# Patient Record
Sex: FEMALE | Race: WHITE | NOT HISPANIC OR LATINO
[De-identification: names, ages, dates, MRNs, and addresses within clinical notes are randomized per-mention and may not be internally consistent; named-entity substitution may affect disease eponyms.]

---

## 2017-01-04 ENCOUNTER — FORM ENCOUNTER (OUTPATIENT)
Age: 57
End: 2017-01-04

## 2017-01-13 ENCOUNTER — RECORD ABSTRACTING (OUTPATIENT)
Age: 57
End: 2017-01-13

## 2017-01-13 DIAGNOSIS — Z78.9 OTHER SPECIFIED HEALTH STATUS: ICD-10-CM

## 2017-01-13 DIAGNOSIS — F41.9 ANXIETY DISORDER, UNSPECIFIED: ICD-10-CM

## 2017-01-13 DIAGNOSIS — Z87.898 PERSONAL HISTORY OF OTHER SPECIFIED CONDITIONS: ICD-10-CM

## 2017-01-13 DIAGNOSIS — I10 ESSENTIAL (PRIMARY) HYPERTENSION: ICD-10-CM

## 2017-01-13 DIAGNOSIS — Z85.850 PERSONAL HISTORY OF MALIGNANT NEOPLASM OF THYROID: ICD-10-CM

## 2017-02-04 ENCOUNTER — TRANSCRIPTION ENCOUNTER (OUTPATIENT)
Age: 57
End: 2017-02-04

## 2017-02-13 ENCOUNTER — APPOINTMENT (OUTPATIENT)
Dept: OBGYN | Facility: CLINIC | Age: 57
End: 2017-02-13

## 2017-02-13 VITALS
HEIGHT: 64 IN | SYSTOLIC BLOOD PRESSURE: 120 MMHG | DIASTOLIC BLOOD PRESSURE: 80 MMHG | WEIGHT: 260 LBS | BODY MASS INDEX: 44.39 KG/M2

## 2017-02-16 ENCOUNTER — APPOINTMENT (OUTPATIENT)
Dept: OBGYN | Facility: CLINIC | Age: 57
End: 2017-02-16

## 2017-03-30 ENCOUNTER — APPOINTMENT (OUTPATIENT)
Dept: PLASTIC SURGERY | Facility: CLINIC | Age: 57
End: 2017-03-30

## 2017-04-24 ENCOUNTER — APPOINTMENT (OUTPATIENT)
Dept: PLASTIC SURGERY | Facility: AMBULATORY SURGERY CENTER | Age: 57
End: 2017-04-24

## 2017-04-26 ENCOUNTER — APPOINTMENT (OUTPATIENT)
Dept: PLASTIC SURGERY | Facility: CLINIC | Age: 57
End: 2017-04-26

## 2017-05-02 ENCOUNTER — APPOINTMENT (OUTPATIENT)
Dept: PLASTIC SURGERY | Facility: CLINIC | Age: 57
End: 2017-05-02

## 2017-05-09 ENCOUNTER — APPOINTMENT (OUTPATIENT)
Dept: PLASTIC SURGERY | Facility: CLINIC | Age: 57
End: 2017-05-09

## 2017-05-11 ENCOUNTER — OUTPATIENT (OUTPATIENT)
Dept: OUTPATIENT SERVICES | Facility: HOSPITAL | Age: 57
LOS: 1 days | Discharge: HOME | End: 2017-05-11

## 2017-05-12 ENCOUNTER — RECORD ABSTRACTING (OUTPATIENT)
Age: 57
End: 2017-05-12

## 2017-05-12 DIAGNOSIS — Z90.49 ACQUIRED ABSENCE OF OTHER SPECIFIED PARTS OF DIGESTIVE TRACT: ICD-10-CM

## 2017-05-12 RX ORDER — POTASSIUM CHLORIDE 10 MEQ
10 CAPSULE, EXTENDED RELEASE ORAL
Refills: 0 | Status: ACTIVE | COMMUNITY

## 2017-05-12 RX ORDER — NIFEDIPINE 30 MG/1
30 TABLET, EXTENDED RELEASE ORAL
Refills: 0 | Status: ACTIVE | COMMUNITY

## 2017-06-10 ENCOUNTER — TRANSCRIPTION ENCOUNTER (OUTPATIENT)
Age: 57
End: 2017-06-10

## 2017-06-19 ENCOUNTER — TRANSCRIPTION ENCOUNTER (OUTPATIENT)
Age: 57
End: 2017-06-19

## 2017-06-28 DIAGNOSIS — M79.89 OTHER SPECIFIED SOFT TISSUE DISORDERS: ICD-10-CM

## 2017-08-02 ENCOUNTER — OUTPATIENT (OUTPATIENT)
Dept: OUTPATIENT SERVICES | Facility: HOSPITAL | Age: 57
LOS: 1 days | Discharge: HOME | End: 2017-08-02

## 2017-08-02 DIAGNOSIS — E89.0 POSTPROCEDURAL HYPOTHYROIDISM: ICD-10-CM

## 2017-08-02 DIAGNOSIS — C73 MALIGNANT NEOPLASM OF THYROID GLAND: ICD-10-CM

## 2017-08-02 DIAGNOSIS — D50.9 IRON DEFICIENCY ANEMIA, UNSPECIFIED: ICD-10-CM

## 2017-08-02 DIAGNOSIS — E78.00 PURE HYPERCHOLESTEROLEMIA, UNSPECIFIED: ICD-10-CM

## 2017-08-02 DIAGNOSIS — E11.9 TYPE 2 DIABETES MELLITUS WITHOUT COMPLICATIONS: ICD-10-CM

## 2017-08-02 DIAGNOSIS — E53.8 DEFICIENCY OF OTHER SPECIFIED B GROUP VITAMINS: ICD-10-CM

## 2017-08-02 DIAGNOSIS — E05.90 THYROTOXICOSIS, UNSPECIFIED WITHOUT THYROTOXIC CRISIS OR STORM: ICD-10-CM

## 2017-08-08 ENCOUNTER — APPOINTMENT (OUTPATIENT)
Dept: PLASTIC SURGERY | Facility: CLINIC | Age: 57
End: 2017-08-08
Payer: COMMERCIAL

## 2017-08-08 PROCEDURE — 99024 POSTOP FOLLOW-UP VISIT: CPT

## 2017-08-08 RX ORDER — LEVOTHYROXINE SODIUM 125 UG/1
125 TABLET ORAL
Qty: 180 | Refills: 0 | Status: ACTIVE | COMMUNITY
Start: 2016-10-18

## 2017-08-14 ENCOUNTER — OUTPATIENT (OUTPATIENT)
Dept: OUTPATIENT SERVICES | Facility: HOSPITAL | Age: 57
LOS: 1 days | Discharge: HOME | End: 2017-08-14

## 2017-08-14 DIAGNOSIS — M79.609 PAIN IN UNSPECIFIED LIMB: ICD-10-CM

## 2017-09-23 ENCOUNTER — TRANSCRIPTION ENCOUNTER (OUTPATIENT)
Age: 57
End: 2017-09-23

## 2017-10-09 ENCOUNTER — OUTPATIENT (OUTPATIENT)
Dept: OUTPATIENT SERVICES | Facility: HOSPITAL | Age: 57
LOS: 1 days | Discharge: HOME | End: 2017-10-09

## 2017-10-09 DIAGNOSIS — C73 MALIGNANT NEOPLASM OF THYROID GLAND: ICD-10-CM

## 2017-10-09 DIAGNOSIS — E89.0 POSTPROCEDURAL HYPOTHYROIDISM: ICD-10-CM

## 2017-10-09 DIAGNOSIS — I10 ESSENTIAL (PRIMARY) HYPERTENSION: ICD-10-CM

## 2017-11-13 ENCOUNTER — FORM ENCOUNTER (OUTPATIENT)
Age: 57
End: 2017-11-13

## 2017-11-14 ENCOUNTER — OUTPATIENT (OUTPATIENT)
Dept: OUTPATIENT SERVICES | Facility: HOSPITAL | Age: 57
LOS: 1 days | Discharge: HOME | End: 2017-11-14

## 2017-11-14 DIAGNOSIS — Z12.31 ENCOUNTER FOR SCREENING MAMMOGRAM FOR MALIGNANT NEOPLASM OF BREAST: ICD-10-CM

## 2018-02-02 ENCOUNTER — TRANSCRIPTION ENCOUNTER (OUTPATIENT)
Age: 58
End: 2018-02-02

## 2018-02-13 ENCOUNTER — APPOINTMENT (OUTPATIENT)
Dept: PLASTIC SURGERY | Facility: CLINIC | Age: 58
End: 2018-02-13
Payer: COMMERCIAL

## 2018-02-13 DIAGNOSIS — C43.72 MALIGNANT MELANOMA OF LEFT LOWER LIMB, INCLUDING HIP: ICD-10-CM

## 2018-02-13 PROCEDURE — 99212 OFFICE O/P EST SF 10 MIN: CPT

## 2018-02-19 ENCOUNTER — TRANSCRIPTION ENCOUNTER (OUTPATIENT)
Age: 58
End: 2018-02-19

## 2018-02-23 ENCOUNTER — OUTPATIENT (OUTPATIENT)
Dept: OUTPATIENT SERVICES | Facility: HOSPITAL | Age: 58
LOS: 1 days | Discharge: HOME | End: 2018-02-23

## 2018-03-10 DIAGNOSIS — M79.605 PAIN IN LEFT LEG: ICD-10-CM

## 2018-04-10 ENCOUNTER — OUTPATIENT (OUTPATIENT)
Dept: OUTPATIENT SERVICES | Facility: HOSPITAL | Age: 58
LOS: 1 days | Discharge: HOME | End: 2018-04-10

## 2018-04-10 DIAGNOSIS — I10 ESSENTIAL (PRIMARY) HYPERTENSION: ICD-10-CM

## 2018-04-10 DIAGNOSIS — E89.0 POSTPROCEDURAL HYPOTHYROIDISM: ICD-10-CM

## 2018-04-10 LAB
ALBUMIN SERPL ELPH-MCNC: 4.4 G/DL — SIGNIFICANT CHANGE UP (ref 3.5–5.2)
ALP SERPL-CCNC: 64 U/L — SIGNIFICANT CHANGE UP (ref 30–115)
ALT FLD-CCNC: 15 U/L — SIGNIFICANT CHANGE UP (ref 0–41)
ANION GAP SERPL CALC-SCNC: 13 MMOL/L — SIGNIFICANT CHANGE UP (ref 7–14)
APPEARANCE UR: CLEAR — SIGNIFICANT CHANGE UP
AST SERPL-CCNC: 14 U/L — SIGNIFICANT CHANGE UP (ref 0–41)
BASOPHILS # BLD AUTO: 0.03 K/UL — SIGNIFICANT CHANGE UP (ref 0–0.2)
BASOPHILS NFR BLD AUTO: 0.5 % — SIGNIFICANT CHANGE UP (ref 0–1)
BILIRUB SERPL-MCNC: 0.5 MG/DL — SIGNIFICANT CHANGE UP (ref 0.2–1.2)
BILIRUB UR-MCNC: NEGATIVE — SIGNIFICANT CHANGE UP
BUN SERPL-MCNC: 27 MG/DL — HIGH (ref 10–20)
CALCIUM SERPL-MCNC: 8.8 MG/DL — SIGNIFICANT CHANGE UP (ref 8.5–10.1)
CHLORIDE SERPL-SCNC: 103 MMOL/L — SIGNIFICANT CHANGE UP (ref 98–110)
CHOLEST SERPL-MCNC: 158 MG/DL — SIGNIFICANT CHANGE UP (ref 100–200)
CO2 SERPL-SCNC: 29 MMOL/L — SIGNIFICANT CHANGE UP (ref 17–32)
COLOR SPEC: YELLOW — SIGNIFICANT CHANGE UP
CREAT SERPL-MCNC: 0.9 MG/DL — SIGNIFICANT CHANGE UP (ref 0.7–1.5)
DIFF PNL FLD: NEGATIVE — SIGNIFICANT CHANGE UP
EOSINOPHIL # BLD AUTO: 0.16 K/UL — SIGNIFICANT CHANGE UP (ref 0–0.7)
EOSINOPHIL NFR BLD AUTO: 2.8 % — SIGNIFICANT CHANGE UP (ref 0–8)
ESTIMATED AVERAGE GLUCOSE: 114 MG/DL — SIGNIFICANT CHANGE UP (ref 68–114)
GLUCOSE SERPL-MCNC: 78 MG/DL — SIGNIFICANT CHANGE UP (ref 70–99)
GLUCOSE UR QL: NEGATIVE MG/DL — SIGNIFICANT CHANGE UP
HBA1C BLD-MCNC: 5.6 % — SIGNIFICANT CHANGE UP (ref 4–5.6)
HCT VFR BLD CALC: 39.2 % — SIGNIFICANT CHANGE UP (ref 37–47)
HDLC SERPL-MCNC: 33 MG/DL — LOW (ref 40–125)
HGB BLD-MCNC: 12.4 G/DL — SIGNIFICANT CHANGE UP (ref 12–16)
IMM GRANULOCYTES NFR BLD AUTO: 0.4 % — HIGH (ref 0.1–0.3)
IRON SATN MFR SERPL: 71 UG/DL — SIGNIFICANT CHANGE UP (ref 35–150)
KETONES UR-MCNC: NEGATIVE — SIGNIFICANT CHANGE UP
LEUKOCYTE ESTERASE UR-ACNC: NEGATIVE — SIGNIFICANT CHANGE UP
LIPID PNL WITH DIRECT LDL SERPL: 111 MG/DL — SIGNIFICANT CHANGE UP (ref 4–129)
LYMPHOCYTES # BLD AUTO: 1.35 K/UL — SIGNIFICANT CHANGE UP (ref 1.2–3.4)
LYMPHOCYTES # BLD AUTO: 23.8 % — SIGNIFICANT CHANGE UP (ref 20.5–51.1)
MCHC RBC-ENTMCNC: 20.4 PG — LOW (ref 27–31)
MCHC RBC-ENTMCNC: 31.6 G/DL — LOW (ref 32–37)
MCV RBC AUTO: 64.4 FL — LOW (ref 81–99)
MONOCYTES # BLD AUTO: 0.4 K/UL — SIGNIFICANT CHANGE UP (ref 0.1–0.6)
MONOCYTES NFR BLD AUTO: 7 % — SIGNIFICANT CHANGE UP (ref 1.7–9.3)
NEUTROPHILS # BLD AUTO: 3.72 K/UL — SIGNIFICANT CHANGE UP (ref 1.4–6.5)
NEUTROPHILS NFR BLD AUTO: 65.5 % — SIGNIFICANT CHANGE UP (ref 42.2–75.2)
NITRITE UR-MCNC: NEGATIVE — SIGNIFICANT CHANGE UP
NRBC # BLD: 0 /100 WBCS — SIGNIFICANT CHANGE UP (ref 0–0)
PH UR: 6 — SIGNIFICANT CHANGE UP (ref 5–8)
PLATELET # BLD AUTO: 301 K/UL — SIGNIFICANT CHANGE UP (ref 130–400)
POTASSIUM SERPL-MCNC: 3.8 MMOL/L — SIGNIFICANT CHANGE UP (ref 3.5–5)
POTASSIUM SERPL-SCNC: 3.8 MMOL/L — SIGNIFICANT CHANGE UP (ref 3.5–5)
PROT SERPL-MCNC: 6.9 G/DL — SIGNIFICANT CHANGE UP (ref 6–8)
PROT UR-MCNC: NEGATIVE MG/DL — SIGNIFICANT CHANGE UP
RBC # BLD: 6.09 M/UL — HIGH (ref 4.2–5.4)
RBC # FLD: 17.5 % — HIGH (ref 11.5–14.5)
SODIUM SERPL-SCNC: 145 MMOL/L — SIGNIFICANT CHANGE UP (ref 135–146)
SP GR SPEC: 1.02 — SIGNIFICANT CHANGE UP (ref 1.01–1.03)
TOTAL CHOLESTEROL/HDL RATIO MEASUREMENT: 4.8 RATIO — SIGNIFICANT CHANGE UP (ref 4–5.5)
TRIGL SERPL-MCNC: 102 MG/DL — SIGNIFICANT CHANGE UP (ref 10–149)
UROBILINOGEN FLD QL: 0.2 MG/DL — SIGNIFICANT CHANGE UP (ref 0.2–0.2)
WBC # BLD: 5.68 K/UL — SIGNIFICANT CHANGE UP (ref 4.8–10.8)
WBC # FLD AUTO: 5.68 K/UL — SIGNIFICANT CHANGE UP (ref 4.8–10.8)

## 2018-04-11 LAB
24R-OH-CALCIDIOL SERPL-MCNC: 18 NG/ML — LOW (ref 30–80)
FERRITIN SERPL-MCNC: 263 NG/ML — HIGH (ref 15–150)
T4 FREE SERPL-MCNC: 1.6 NG/DL — SIGNIFICANT CHANGE UP (ref 0.9–1.8)
THYROGLOB AB FLD IA-ACNC: <20 IU/ML — SIGNIFICANT CHANGE UP (ref 0–40)
THYROGLOB AB SERPL-ACNC: <20 IU/ML — SIGNIFICANT CHANGE UP (ref 0–40)
THYROGLOB SERPL-MCNC: <0.2 NG/ML — LOW (ref 1.6–59.9)
TSH SERPL-MCNC: 1.66 UIU/ML — SIGNIFICANT CHANGE UP (ref 0.27–4.2)

## 2018-04-20 ENCOUNTER — APPOINTMENT (OUTPATIENT)
Dept: PLASTIC SURGERY | Facility: CLINIC | Age: 58
End: 2018-04-20
Payer: COMMERCIAL

## 2018-04-20 ENCOUNTER — LABORATORY RESULT (OUTPATIENT)
Age: 58
End: 2018-04-20

## 2018-04-20 ENCOUNTER — OUTPATIENT (OUTPATIENT)
Dept: OUTPATIENT SERVICES | Facility: HOSPITAL | Age: 58
LOS: 1 days | Discharge: HOME | End: 2018-04-20

## 2018-04-20 DIAGNOSIS — D48.5 NEOPLASM OF UNCERTAIN BEHAVIOR OF SKIN: ICD-10-CM

## 2018-04-20 PROCEDURE — 11402 EXC TR-EXT B9+MARG 1.1-2 CM: CPT

## 2018-04-20 PROCEDURE — 13121 CMPLX RPR S/A/L 2.6-7.5 CM: CPT | Mod: 59

## 2018-04-23 DIAGNOSIS — D48.5 NEOPLASM OF UNCERTAIN BEHAVIOR OF SKIN: ICD-10-CM

## 2018-05-03 ENCOUNTER — APPOINTMENT (OUTPATIENT)
Dept: PLASTIC SURGERY | Facility: CLINIC | Age: 58
End: 2018-05-03
Payer: COMMERCIAL

## 2018-05-03 DIAGNOSIS — D17.9 BENIGN LIPOMATOUS NEOPLASM, UNSPECIFIED: ICD-10-CM

## 2018-05-03 PROCEDURE — 99024 POSTOP FOLLOW-UP VISIT: CPT

## 2018-06-04 ENCOUNTER — APPOINTMENT (OUTPATIENT)
Dept: OBGYN | Facility: CLINIC | Age: 58
End: 2018-06-04

## 2018-06-04 ENCOUNTER — OUTPATIENT (OUTPATIENT)
Dept: OUTPATIENT SERVICES | Facility: HOSPITAL | Age: 58
LOS: 1 days | Discharge: HOME | End: 2018-06-04

## 2018-06-04 ENCOUNTER — LABORATORY RESULT (OUTPATIENT)
Age: 58
End: 2018-06-04

## 2018-06-04 VITALS
SYSTOLIC BLOOD PRESSURE: 122 MMHG | HEIGHT: 64 IN | WEIGHT: 264 LBS | BODY MASS INDEX: 45.07 KG/M2 | DIASTOLIC BLOOD PRESSURE: 80 MMHG

## 2018-06-05 DIAGNOSIS — Z01.419 ENCOUNTER FOR GYNECOLOGICAL EXAMINATION (GENERAL) (ROUTINE) WITHOUT ABNORMAL FINDINGS: ICD-10-CM

## 2018-06-13 DIAGNOSIS — Z01.419 ENCOUNTER FOR GYNECOLOGICAL EXAMINATION (GENERAL) (ROUTINE) WITHOUT ABNORMAL FINDINGS: ICD-10-CM

## 2018-06-20 LAB — HPV HIGH+LOW RISK DNA PNL CVX: DETECTED

## 2018-06-25 ENCOUNTER — OTHER (OUTPATIENT)
Age: 58
End: 2018-06-25

## 2018-07-10 ENCOUNTER — OUTPATIENT (OUTPATIENT)
Dept: OUTPATIENT SERVICES | Facility: HOSPITAL | Age: 58
LOS: 1 days | Discharge: HOME | End: 2018-07-10

## 2018-07-10 DIAGNOSIS — M25.572 PAIN IN LEFT ANKLE AND JOINTS OF LEFT FOOT: ICD-10-CM

## 2018-07-30 ENCOUNTER — OUTPATIENT (OUTPATIENT)
Dept: OUTPATIENT SERVICES | Facility: HOSPITAL | Age: 58
LOS: 1 days | Discharge: HOME | End: 2018-07-30

## 2018-07-30 ENCOUNTER — APPOINTMENT (OUTPATIENT)
Dept: OBGYN | Facility: CLINIC | Age: 58
End: 2018-07-30

## 2018-07-30 ENCOUNTER — RESULT CHARGE (OUTPATIENT)
Age: 58
End: 2018-07-30

## 2018-07-30 VITALS
DIASTOLIC BLOOD PRESSURE: 80 MMHG | SYSTOLIC BLOOD PRESSURE: 130 MMHG | BODY MASS INDEX: 45.07 KG/M2 | WEIGHT: 264 LBS | HEIGHT: 64 IN

## 2018-07-30 LAB
HCG UR QL: NEGATIVE
QUALITY CONTROL: YES

## 2018-07-31 DIAGNOSIS — R87.810 CERVICAL HIGH RISK HUMAN PAPILLOMAVIRUS (HPV) DNA TEST POSITIVE: ICD-10-CM

## 2018-09-17 ENCOUNTER — OUTPATIENT (OUTPATIENT)
Dept: OUTPATIENT SERVICES | Facility: HOSPITAL | Age: 58
LOS: 1 days | Discharge: HOME | End: 2018-09-17

## 2018-09-20 ENCOUNTER — OUTPATIENT (OUTPATIENT)
Dept: OUTPATIENT SERVICES | Facility: HOSPITAL | Age: 58
LOS: 1 days | Discharge: HOME | End: 2018-09-20

## 2018-09-24 ENCOUNTER — OUTPATIENT (OUTPATIENT)
Dept: OUTPATIENT SERVICES | Facility: HOSPITAL | Age: 58
LOS: 1 days | Discharge: HOME | End: 2018-09-24

## 2018-09-24 DIAGNOSIS — Z79.01 LONG TERM (CURRENT) USE OF ANTICOAGULANTS: ICD-10-CM

## 2018-09-26 DIAGNOSIS — Z79.01 LONG TERM (CURRENT) USE OF ANTICOAGULANTS: ICD-10-CM

## 2018-09-27 ENCOUNTER — OUTPATIENT (OUTPATIENT)
Dept: OUTPATIENT SERVICES | Facility: HOSPITAL | Age: 58
LOS: 1 days | Discharge: HOME | End: 2018-09-27

## 2018-09-27 DIAGNOSIS — Z79.01 LONG TERM (CURRENT) USE OF ANTICOAGULANTS: ICD-10-CM

## 2018-10-01 ENCOUNTER — OUTPATIENT (OUTPATIENT)
Dept: OUTPATIENT SERVICES | Facility: HOSPITAL | Age: 58
LOS: 1 days | Discharge: HOME | End: 2018-10-01

## 2018-10-01 DIAGNOSIS — Z79.01 LONG TERM (CURRENT) USE OF ANTICOAGULANTS: ICD-10-CM

## 2018-10-04 ENCOUNTER — OUTPATIENT (OUTPATIENT)
Dept: OUTPATIENT SERVICES | Facility: HOSPITAL | Age: 58
LOS: 1 days | Discharge: HOME | End: 2018-10-04

## 2018-10-04 DIAGNOSIS — Z79.01 LONG TERM (CURRENT) USE OF ANTICOAGULANTS: ICD-10-CM

## 2018-10-07 ENCOUNTER — EMERGENCY (EMERGENCY)
Facility: HOSPITAL | Age: 58
LOS: 0 days | Discharge: HOME | End: 2018-10-07
Attending: EMERGENCY MEDICINE | Admitting: PHYSICIAN ASSISTANT
Payer: OTHER MISCELLANEOUS

## 2018-10-07 DIAGNOSIS — M79.605 PAIN IN LEFT LEG: ICD-10-CM

## 2018-10-07 PROCEDURE — 93971 EXTREMITY STUDY: CPT | Mod: 26

## 2018-10-08 ENCOUNTER — OUTPATIENT (OUTPATIENT)
Dept: OUTPATIENT SERVICES | Facility: HOSPITAL | Age: 58
LOS: 1 days | Discharge: HOME | End: 2018-10-08

## 2018-10-08 DIAGNOSIS — Z79.01 LONG TERM (CURRENT) USE OF ANTICOAGULANTS: ICD-10-CM

## 2018-10-11 ENCOUNTER — OUTPATIENT (OUTPATIENT)
Dept: OUTPATIENT SERVICES | Facility: HOSPITAL | Age: 58
LOS: 1 days | Discharge: HOME | End: 2018-10-11

## 2018-10-11 DIAGNOSIS — Z79.01 LONG TERM (CURRENT) USE OF ANTICOAGULANTS: ICD-10-CM

## 2018-10-15 ENCOUNTER — OUTPATIENT (OUTPATIENT)
Dept: OUTPATIENT SERVICES | Facility: HOSPITAL | Age: 58
LOS: 1 days | Discharge: HOME | End: 2018-10-15

## 2018-10-16 ENCOUNTER — OUTPATIENT (OUTPATIENT)
Dept: OUTPATIENT SERVICES | Facility: HOSPITAL | Age: 58
LOS: 1 days | Discharge: HOME | End: 2018-10-16

## 2018-10-16 DIAGNOSIS — E55.9 VITAMIN D DEFICIENCY, UNSPECIFIED: ICD-10-CM

## 2018-10-16 DIAGNOSIS — I10 ESSENTIAL (PRIMARY) HYPERTENSION: ICD-10-CM

## 2018-10-16 DIAGNOSIS — E78.2 MIXED HYPERLIPIDEMIA: ICD-10-CM

## 2018-10-16 DIAGNOSIS — E06.3 AUTOIMMUNE THYROIDITIS: ICD-10-CM

## 2018-11-09 ENCOUNTER — APPOINTMENT (OUTPATIENT)
Dept: VASCULAR SURGERY | Facility: CLINIC | Age: 58
End: 2018-11-09
Payer: COMMERCIAL

## 2018-11-09 VITALS
DIASTOLIC BLOOD PRESSURE: 80 MMHG | SYSTOLIC BLOOD PRESSURE: 130 MMHG | BODY MASS INDEX: 42 KG/M2 | HEIGHT: 64 IN | WEIGHT: 246 LBS

## 2018-11-09 PROCEDURE — 99213 OFFICE O/P EST LOW 20 MIN: CPT

## 2018-11-09 PROCEDURE — 93971 EXTREMITY STUDY: CPT

## 2018-11-20 ENCOUNTER — OUTPATIENT (OUTPATIENT)
Dept: OUTPATIENT SERVICES | Facility: HOSPITAL | Age: 58
LOS: 1 days | Discharge: HOME | End: 2018-11-20

## 2018-11-20 DIAGNOSIS — E89.0 POSTPROCEDURAL HYPOTHYROIDISM: ICD-10-CM

## 2018-11-20 DIAGNOSIS — I10 ESSENTIAL (PRIMARY) HYPERTENSION: ICD-10-CM

## 2018-11-20 DIAGNOSIS — C73 MALIGNANT NEOPLASM OF THYROID GLAND: ICD-10-CM

## 2018-11-26 ENCOUNTER — OUTPATIENT (OUTPATIENT)
Dept: OUTPATIENT SERVICES | Facility: HOSPITAL | Age: 58
LOS: 1 days | Discharge: HOME | End: 2018-11-26

## 2018-11-26 DIAGNOSIS — Z12.31 ENCOUNTER FOR SCREENING MAMMOGRAM FOR MALIGNANT NEOPLASM OF BREAST: ICD-10-CM

## 2018-12-15 ENCOUNTER — TRANSCRIPTION ENCOUNTER (OUTPATIENT)
Age: 58
End: 2018-12-15

## 2019-02-19 ENCOUNTER — FORM ENCOUNTER (OUTPATIENT)
Age: 59
End: 2019-02-19

## 2019-02-20 ENCOUNTER — OUTPATIENT (OUTPATIENT)
Dept: OUTPATIENT SERVICES | Facility: HOSPITAL | Age: 59
LOS: 1 days | End: 2019-02-20
Payer: OTHER MISCELLANEOUS

## 2019-02-20 ENCOUNTER — APPOINTMENT (OUTPATIENT)
Dept: ORTHOPEDIC SURGERY | Facility: CLINIC | Age: 59
End: 2019-02-20
Payer: OTHER MISCELLANEOUS

## 2019-02-20 ENCOUNTER — APPOINTMENT (OUTPATIENT)
Dept: RADIOLOGY | Facility: CLINIC | Age: 59
End: 2019-02-20

## 2019-02-20 VITALS — RESPIRATION RATE: 16 BRPM | BODY MASS INDEX: 42 KG/M2 | WEIGHT: 246 LBS | HEIGHT: 64 IN

## 2019-02-20 DIAGNOSIS — M24.562 CONTRACTURE, LEFT KNEE: ICD-10-CM

## 2019-02-20 PROCEDURE — 99204 OFFICE O/P NEW MOD 45 MIN: CPT

## 2019-02-20 PROCEDURE — 73564 X-RAY EXAM KNEE 4 OR MORE: CPT

## 2019-02-20 PROCEDURE — 73564 X-RAY EXAM KNEE 4 OR MORE: CPT | Mod: 26,50

## 2019-02-20 RX ORDER — UBIDECARENONE/VIT E ACET 100MG-5
CAPSULE ORAL
Refills: 0 | Status: ACTIVE | COMMUNITY

## 2019-03-12 ENCOUNTER — OUTPATIENT (OUTPATIENT)
Dept: OUTPATIENT SERVICES | Facility: HOSPITAL | Age: 59
LOS: 1 days | Discharge: HOME | End: 2019-03-12

## 2019-03-12 DIAGNOSIS — E55.9 VITAMIN D DEFICIENCY, UNSPECIFIED: ICD-10-CM

## 2019-03-12 DIAGNOSIS — E53.8 DEFICIENCY OF OTHER SPECIFIED B GROUP VITAMINS: ICD-10-CM

## 2019-03-12 DIAGNOSIS — E78.5 HYPERLIPIDEMIA, UNSPECIFIED: ICD-10-CM

## 2019-03-12 DIAGNOSIS — D64.9 ANEMIA, UNSPECIFIED: ICD-10-CM

## 2019-03-12 DIAGNOSIS — D50.9 IRON DEFICIENCY ANEMIA, UNSPECIFIED: ICD-10-CM

## 2019-03-12 DIAGNOSIS — E11.9 TYPE 2 DIABETES MELLITUS WITHOUT COMPLICATIONS: ICD-10-CM

## 2019-03-12 DIAGNOSIS — M06.9 RHEUMATOID ARTHRITIS, UNSPECIFIED: ICD-10-CM

## 2019-03-12 DIAGNOSIS — N39.0 URINARY TRACT INFECTION, SITE NOT SPECIFIED: ICD-10-CM

## 2019-03-12 DIAGNOSIS — A69.20 LYME DISEASE, UNSPECIFIED: ICD-10-CM

## 2019-03-15 ENCOUNTER — APPOINTMENT (OUTPATIENT)
Dept: VASCULAR SURGERY | Facility: CLINIC | Age: 59
End: 2019-03-15
Payer: COMMERCIAL

## 2019-03-15 PROCEDURE — 93970 EXTREMITY STUDY: CPT

## 2019-03-15 PROCEDURE — 99213 OFFICE O/P EST LOW 20 MIN: CPT

## 2019-03-15 NOTE — HISTORY OF PRESENT ILLNESS
[FreeTextEntry1] : 58 y/o female with bilateral leg swelling and pain after left total knee replacement 6 months ago, sent in by PT for venous duplex to rule out DVT.

## 2019-03-15 NOTE — ASSESSMENT
[FreeTextEntry1] : 58 y/o female with bilateral leg swelling and pain after left total knee replacement 6 months ago, sent in by PT for venous duplex to rule out DVT. I performed a venous duplex which was medically necessary to evaluate for DVT. It showed no evidence of DVT bilaterally. I have informed her of the test result and reassured her. She may continue with physical therapy as tolerated.\par

## 2019-03-15 NOTE — DATA REVIEWED
[FreeTextEntry1] : I performed a venous duplex which was medically necessary to evaluate for DVT. It showed no evidence of DVT bilaterally.\par

## 2019-06-18 ENCOUNTER — OUTPATIENT (OUTPATIENT)
Dept: OUTPATIENT SERVICES | Facility: HOSPITAL | Age: 59
LOS: 1 days | Discharge: HOME | End: 2019-06-18

## 2019-06-18 DIAGNOSIS — E89.0 POSTPROCEDURAL HYPOTHYROIDISM: ICD-10-CM

## 2019-06-18 DIAGNOSIS — I10 ESSENTIAL (PRIMARY) HYPERTENSION: ICD-10-CM

## 2019-06-27 ENCOUNTER — FORM ENCOUNTER (OUTPATIENT)
Age: 59
End: 2019-06-27

## 2019-06-28 ENCOUNTER — OUTPATIENT (OUTPATIENT)
Dept: OUTPATIENT SERVICES | Facility: HOSPITAL | Age: 59
LOS: 1 days | End: 2019-06-28
Payer: OTHER MISCELLANEOUS

## 2019-06-28 ENCOUNTER — APPOINTMENT (OUTPATIENT)
Dept: RADIOLOGY | Facility: CLINIC | Age: 59
End: 2019-06-28

## 2019-06-28 ENCOUNTER — APPOINTMENT (OUTPATIENT)
Dept: ORTHOPEDIC SURGERY | Facility: CLINIC | Age: 59
End: 2019-06-28
Payer: OTHER MISCELLANEOUS

## 2019-06-28 VITALS — BODY MASS INDEX: 42 KG/M2 | HEIGHT: 64 IN | RESPIRATION RATE: 16 BRPM | WEIGHT: 246 LBS

## 2019-06-28 PROCEDURE — 99213 OFFICE O/P EST LOW 20 MIN: CPT

## 2019-06-28 PROCEDURE — 73564 X-RAY EXAM KNEE 4 OR MORE: CPT | Mod: 26,50

## 2019-07-10 LAB
BASOPHILS # BLD AUTO: 0.04 K/UL
BASOPHILS NFR BLD AUTO: 0.8 %
CRP SERPL-MCNC: 0.54 MG/DL
EOSINOPHIL # BLD AUTO: 0.15 K/UL
EOSINOPHIL NFR BLD AUTO: 2.9 %
ERYTHROCYTE [SEDIMENTATION RATE] IN BLOOD BY WESTERGREN METHOD: 36 MM/HR
HCT VFR BLD CALC: 41 %
HGB BLD-MCNC: 12 G/DL
IMM GRANULOCYTES NFR BLD AUTO: 0.4 %
LYMPHOCYTES # BLD AUTO: 1.18 K/UL
LYMPHOCYTES NFR BLD AUTO: 23 %
MAN DIFF?: NORMAL
MCHC RBC-ENTMCNC: 20.5 PG
MCHC RBC-ENTMCNC: 29.3 GM/DL
MCV RBC AUTO: 70.1 FL
MONOCYTES # BLD AUTO: 0.35 K/UL
MONOCYTES NFR BLD AUTO: 6.8 %
NEUTROPHILS # BLD AUTO: 3.39 K/UL
NEUTROPHILS NFR BLD AUTO: 66.1 %
PLATELET # BLD AUTO: 294 K/UL
RBC # BLD: 5.85 M/UL
RBC # FLD: 18.7 %
WBC # FLD AUTO: 5.13 K/UL

## 2019-07-11 ENCOUNTER — TRANSCRIPTION ENCOUNTER (OUTPATIENT)
Age: 59
End: 2019-07-11

## 2019-07-19 ENCOUNTER — APPOINTMENT (OUTPATIENT)
Dept: ORTHOPEDIC SURGERY | Facility: CLINIC | Age: 59
End: 2019-07-19
Payer: OTHER MISCELLANEOUS

## 2019-07-19 PROCEDURE — 20610 DRAIN/INJ JOINT/BURSA W/O US: CPT | Mod: RT

## 2019-07-19 NOTE — PROCEDURE
[de-identified] : Procedure: Euflexxa injection of the Right Knee joint \par Indication:  Inflammation and Joint pain. \par Risk, benefits and alternatives were discussed with the patient. Potential complications include bleeding, infection and allergic reaction. Verbal consent was obtained prior to the procedure. \par Alcohol was used to prep the area.  Ethyl chloride spray was used as a topical anesthetic. Using sterile technique, the aspiration/injection needle was then directed from a lateral and superior aspect. The procedure was ultrasound guided.  A 21 G 1.5 inch needle was used to inject 2 mL of Euflexxa lot# \par [id number]  Exp 2020-02-17\par A bandage was applied.  The patient tolerated the procedure well. \par Complications: None. \par Patient instructed to avoid strenuous activity for 2 day(s). \par Follow-up for repeat injection in 1-2 weeks, or following 3rd injection in 4-6 months; if pain is unresolved; or for further concerns.   Specifically discussed signs and symptoms of aseptic synovitis as well as septic arthritis, instructed patient to immediately present to the clinic (if open), or to an emergency room if these occur for further evaluation.\par

## 2019-07-19 NOTE — PHYSICAL EXAM
[de-identified] : Patient demonstrates antalgic gait with shortened stride length both the right and left side.\par \par Left Knee:\par \par Examination of the involved knee was performed inclusive of the following tests:\par \par Inspection:  effusion,quadriceps atrophy, skin\par \par Gait: stride length, john, heel strike\par \par Range of Motion: active and passive with comparison to contralateral knee\par \par Strength Testing: flexion and extention\par \par Tenderness Evaluation: medial and lateral joint line, medial and lateral patellar facet, quadriceps and patellar tendon, hamstring, pes anserinus\par \par Stability: varus and valgus at 0 and 30 degrees (1-3+), Lachman (1A unless noted),  anterior drawer (1-3+), posterior drawer (1-3+), pivot-shift (normal, glide, shift)\par \par Meniscus: Calos, Thessaly\par \par Patellofemoral: patellar grind, patellar compression, tracking, J-sign, tilt, test, apprehension, medial and lateral translation (2 quadrants unless otherwise noted)\par \par Abnormal findings were as follows:\par Patient ambulates with a cane and an antalgic gait. Ligamentously stable. She has full extension to 120 degrees flexion in the right knee and flexion contracture in the left knee with 10 degrees to 120 degrees range of motion of the left knee. Ligamentously stable.The incision is well-healed. There is mild effusion on exam today. There is no erythema. No warmth. There is diffuse tenderness to palpation.\par \par Right Knee:\par \par Examination of the involved knee was performed inclusive of the following tests:\par \par Inspection:  effusion,quadriceps atrophy, skin\par \par Gait: stride length, john, heel strike\par \par Range of Motion: active and passive with comparison to contralateral knee\par \par Strength Testing: flexion and extention\par \par Tenderness Evaluation: medial and lateral joint line, medial and lateral patellar facet, quadriceps and patellar tendon, hamstring, pes anserinus\par \par Stability: varus and valgus at 0 and 30 degrees (1-3+), Lachman (1A unless noted),  anterior drawer (1-3+), posterior drawer (1-3+), pivot-shift (normal, glide, shift)\par \par Meniscus: Reginaldo Live\par \par Patellofemoral: patellar grind, patellar compression, tracking, J-sign, tilt, test, apprehension, medial and lateral translation (2 quadrants unless otherwise noted)\par \par Abnormal findings were as follows:\par Right knee examination demonstrates range of motion from 5° to 120° today with a slight flexion contracture. Positive patellar compression and grind. Positive effusion. Positive tenderness to palpation of the medial and lateral joint line. Pain with Calos's both medially and laterally. Ligamentously stable [de-identified] : 6-28-19: \par New imaging: weightbearin X-Rays Were performed of the bilateft side demonstrate total knee arthroplastywhich is an ingrowth  Position and alignment within acceptable limits. Appropriately sized. There is some inset positioning of the tibia tray relativ given the ingrowth nature of this implant this is within acceptable limits. There is some lucency around the medial lateral periphery of the tibial baseplate but some evidence of early ingrowth on the baseplate until. No effusion. No other lucencies.\par \par Right knee x-ray images demonstrate moderate osteoarthritis with predominant patellofemoral involvement. There  is sclerosis and narrowing.\par

## 2019-07-19 NOTE — REASON FOR VISIT
[Follow-Up Visit] : a follow-up visit for [FreeTextEntry2] : right knee osteoarthritis, followup left painful TKA

## 2019-07-19 NOTE — HISTORY OF PRESENT ILLNESS
[de-identified] : 58 yo female presents for right knee osteoarthritis, probable Euflexxa injection;  followup painful left TKA.  No change in symptoms from prior visit. Pain in right knee moderate to severe, worse with squatting or knee flexion.  Pain in left knee is persistent.  Denies fever or chills, continued stiffness.  1 of 2 inflammatory markers at last visit were positie.

## 2019-07-19 NOTE — DISCUSSION/SUMMARY
[de-identified] : Patient was counseled once again regarding findings.\par \par With respect to the left knee she is one of 2 infectious markers positive. At this time recommend repeat infectious markers in 1-2 weeks. If she remains positive at that time would consider aspiration of the joint.\par \par With respect to the right knee, patient like to proceed with the Visco supplementation series. First dose of the flexor was applied today. Patient will return in 1-2 weeks for secondary injection.

## 2019-08-02 ENCOUNTER — APPOINTMENT (OUTPATIENT)
Dept: ORTHOPEDIC SURGERY | Facility: CLINIC | Age: 59
End: 2019-08-02
Payer: OTHER MISCELLANEOUS

## 2019-08-02 PROCEDURE — 99213 OFFICE O/P EST LOW 20 MIN: CPT | Mod: 25

## 2019-08-02 PROCEDURE — 20610 DRAIN/INJ JOINT/BURSA W/O US: CPT | Mod: RT

## 2019-08-02 NOTE — HISTORY OF PRESENT ILLNESS
[de-identified] : 59-year-old female presents in followup for her bilateral knee pain.\par \par Patient had her first right knee reflex injection several weeks ago. Reports some improvement in symptoms. Pain is still present but less severe. Catching/grinding sensation is less common. So far she is satisfied with the result and is eager for her second injection.\par \par With regards to the left knee, patient has persistent pain and stiffness. She is not obtained her repeat ESR and CRP this point is to early next week. Pain remained constant. She has intermittent episodes of swelling and stiffness.

## 2019-08-02 NOTE — REASON FOR VISIT
[Follow-Up Visit] : a follow-up visit for [FreeTextEntry2] : Followup for her right knee physical supplementation injection, left painful total knee arthroplasty

## 2019-08-02 NOTE — PROCEDURE
[de-identified] : Procedure: Euflexxa injection of the Right Knee joint \par Indication:  Inflammation and Joint pain. \par Risk, benefits and alternatives were discussed with the patient. Potential complications include bleeding, infection and allergic reaction. Verbal consent was obtained prior to the procedure. \par Alcohol was used to prep the area.  Ethyl chloride spray was used as a topical anesthetic. Using sterile technique, the aspiration/injection needle was then directed from a lateral and superior aspect. The procedure was ultrasound guided.  A 21 G 1.5 inch needle was used to inject 2 mL of Euflexxa lot# \par [id number]  Exp 2020-02-17\par A bandage was applied.  The patient tolerated the procedure well. \par Complications: None. \par Patient instructed to avoid strenuous activity for 2 day(s). \par Follow-up for repeat injection in 1-2 weeks, or following 3rd injection in 4-6 months; if pain is unresolved; or for further concerns.   Specifically discussed signs and symptoms of aseptic synovitis as well as septic arthritis, instructed patient to immediately present to the clinic (if open), or to an emergency room if these occur for further evaluation.\par

## 2019-08-02 NOTE — PHYSICAL EXAM
[de-identified] : 6-28-19: \par New imaging: weightbearing X-Rays were performed of the bilateft side demonstrate total knee arthroplasty which is an ingrowth  Position and alignment within acceptable limits. Appropriately sized. There is some inset positioning of the tibia tray relative given the ingrowth nature of this implant this is within acceptable limits. There is some lucency around the medial lateral periphery of the tibial baseplate but some evidence of early ingrowth on the baseplate until. No effusion. No other lucencies.\par \par Right knee x-ray images demonstrate moderate osteoarthritis with predominant patellofemoral involvement. There  is sclerosis and narrowing.\par  [de-identified] : Patient demonstrates antalgic gait with shortened stride length both the right and left side.\par \par Left Knee:\par \par Examination of the involved knee was performed inclusive of the following tests:\par \par Inspection:  effusion,quadriceps atrophy, skin\par \par Gait: stride length, john, heel strike\par \par Range of Motion: active and passive with comparison to contralateral knee\par \par Strength Testing: flexion and extention\par \par Tenderness Evaluation: medial and lateral joint line, medial and lateral patellar facet, quadriceps and patellar tendon, hamstring, pes anserinus\par \par Stability: varus and valgus at 0 and 30 degrees (1-3+), Lachman (1A unless noted),  anterior drawer (1-3+), posterior drawer (1-3+), pivot-shift (normal, glide, shift)\par \par Meniscus: Calos, Thessaly\par \par Patellofemoral: patellar grind, patellar compression, tracking, J-sign, tilt, test, apprehension, medial and lateral translation (2 quadrants unless otherwise noted)\par \par Abnormal findings were as follows:\par Patient ambulates with a cane and an antalgic gait. Ligamentously stable. She has full extension to 120 degrees flexion in the right knee and flexion contracture in the left knee with 10 degrees to 120 degrees range of motion of the left knee. Ligamentously stable.The incision is well-healed. There is mild effusion on exam today. There is no erythema. No warmth. There is diffuse tenderness to palpation.\par \par Right Knee:\par \par Examination of the involved knee was performed inclusive of the following tests:\par \par Inspection:  effusion,quadriceps atrophy, skin\par \par Gait: stride length, john, heel strike\par \par Range of Motion: active and passive with comparison to contralateral knee\par \par Strength Testing: flexion and extention\par \par Tenderness Evaluation: medial and lateral joint line, medial and lateral patellar facet, quadriceps and patellar tendon, hamstring, pes anserinus\par \par Stability: varus and valgus at 0 and 30 degrees (1-3+), Lachman (1A unless noted),  anterior drawer (1-3+), posterior drawer (1-3+), pivot-shift (normal, glide, shift)\par \par Meniscus: Reginaldo Live\par \par Patellofemoral: patellar grind, patellar compression, tracking, J-sign, tilt, test, apprehension, medial and lateral translation (2 quadrants unless otherwise noted)\par \par Abnormal findings were as follows:\par Right knee examination demonstrates range of motion from 5° to 120° today with a slight flexion contracture. Positive patellar compression and grind. Positive effusion. Positive tenderness to palpation of the medial and lateral joint line. Pain with Calos's both medially and laterally. Ligamentously stable

## 2019-08-05 ENCOUNTER — APPOINTMENT (OUTPATIENT)
Dept: OBGYN | Facility: CLINIC | Age: 59
End: 2019-08-05

## 2019-08-09 ENCOUNTER — LABORATORY RESULT (OUTPATIENT)
Age: 59
End: 2019-08-09

## 2019-08-09 ENCOUNTER — OUTPATIENT (OUTPATIENT)
Dept: OUTPATIENT SERVICES | Facility: HOSPITAL | Age: 59
LOS: 1 days | Discharge: HOME | End: 2019-08-09

## 2019-08-09 DIAGNOSIS — T84.84XA PAIN DUE TO INTERNAL ORTHOPEDIC PROSTHETIC DEVICES, IMPLANTS AND GRAFTS, INITIAL ENCOUNTER: ICD-10-CM

## 2019-08-12 ENCOUNTER — OUTPATIENT (OUTPATIENT)
Dept: OUTPATIENT SERVICES | Facility: HOSPITAL | Age: 59
LOS: 1 days | Discharge: HOME | End: 2019-08-12

## 2019-08-12 ENCOUNTER — APPOINTMENT (OUTPATIENT)
Dept: OBGYN | Facility: CLINIC | Age: 59
End: 2019-08-12
Payer: COMMERCIAL

## 2019-08-12 VITALS
SYSTOLIC BLOOD PRESSURE: 126 MMHG | WEIGHT: 255 LBS | BODY MASS INDEX: 43.54 KG/M2 | DIASTOLIC BLOOD PRESSURE: 90 MMHG | HEIGHT: 64 IN

## 2019-08-12 PROCEDURE — 99396 PREV VISIT EST AGE 40-64: CPT

## 2019-08-12 NOTE — HISTORY OF PRESENT ILLNESS
[1 Year Ago] : 1 year ago [Last Mammogram ___] : Last Mammogram was [unfilled] [Last Pap ___] : Last cervical pap smear was [unfilled]

## 2019-08-14 DIAGNOSIS — Z01.419 ENCOUNTER FOR GYNECOLOGICAL EXAMINATION (GENERAL) (ROUTINE) WITHOUT ABNORMAL FINDINGS: ICD-10-CM

## 2019-08-16 LAB — HPV HIGH+LOW RISK DNA PNL CVX: NOT DETECTED

## 2019-08-30 LAB — CRP SERPL-MCNC: 0.75 MG/DL

## 2019-09-06 ENCOUNTER — APPOINTMENT (OUTPATIENT)
Dept: ORTHOPEDIC SURGERY | Facility: CLINIC | Age: 59
End: 2019-09-06
Payer: OTHER MISCELLANEOUS

## 2019-09-06 DIAGNOSIS — E66.9 OBESITY, UNSPECIFIED: ICD-10-CM

## 2019-09-06 PROCEDURE — 99213 OFFICE O/P EST LOW 20 MIN: CPT | Mod: 25

## 2019-09-06 PROCEDURE — 20610 DRAIN/INJ JOINT/BURSA W/O US: CPT | Mod: RT

## 2019-09-06 NOTE — HISTORY OF PRESENT ILLNESS
[de-identified] : 59-year-old female presents in follow-up for her right knee osteoarthritis.  She is received 2 prior Euflexxa injections and would like to proceed with the third today.  She reports partial improvement in symptoms approximately 50% relief since initiating treatment on the right-hand side.  Denies any fever chills or swelling.  Overall satisfied with progress to date.  She would ideally like additional improvement following the third injection.\par \par With regards to the left knee, patient is already reviewed her own lab results and does identify that she has partial abnormalities with her ESR and CRP.  She would like to discuss additional treatment options moving forward.

## 2019-09-06 NOTE — PROCEDURE
[de-identified] : Procedure: Euflexxa injection of the Right Knee joint \par Indication:  Inflammation and Joint pain. \par Risk, benefits and alternatives were discussed with the patient. Potential complications include bleeding, infection and allergic reaction. Verbal consent was obtained prior to the procedure. \par Alcohol was used to prep the area.  Ethyl chloride spray was used as a topical anesthetic. Using sterile technique, the aspiration/injection needle was then directed from a lateral and superior aspect. The procedure was ultrasound guided.  A 21 G 1.5 inch needle was used to inject 2 mL of Euflexxa lot# \par [id number]  Exp 2020-02-17\par A bandage was applied.  The patient tolerated the procedure well. \par Complications: None. \par Patient instructed to avoid strenuous activity for 2 day(s). \par Follow-up for repeat injection in 1-2 weeks, or following 3rd injection in 4-6 months; if pain is unresolved; or for further concerns.   Specifically discussed signs and symptoms of aseptic synovitis as well as septic arthritis, instructed patient to immediately present to the clinic (if open), or to an emergency room if these occur for further evaluation.\par

## 2019-09-06 NOTE — PHYSICAL EXAM
[de-identified] : Patient demonstrates antalgic gait with shortened stride length both the right and left side.  Exam is unchanged from her visit last month.\par \par Left Knee:\par \par Examination of the involved knee was performed inclusive of the following tests:\par \par Inspection:  effusion,quadriceps atrophy, skin\par \par Gait: stride length, john, heel strike\par \par Range of Motion: active and passive with comparison to contralateral knee\par \par Strength Testing: flexion and extention\par \par Tenderness Evaluation: medial and lateral joint line, medial and lateral patellar facet, quadriceps and patellar tendon, hamstring, pes anserinus\par \par Stability: varus and valgus at 0 and 30 degrees (1-3+), Lachman (1A unless noted),  anterior drawer (1-3+), posterior drawer (1-3+), pivot-shift (normal, glide, shift)\par \par Meniscus: Calos, Thessaly\par \par Patellofemoral: patellar grind, patellar compression, tracking, J-sign, tilt, test, apprehension, medial and lateral translation (2 quadrants unless otherwise noted)\par \par Abnormal findings were as follows:\par Patient ambulates with a cane and an antalgic gait. Ligamentously stable. She has full extension to 120 degrees flexion in the right knee and flexion contracture in the left knee with 10 degrees to 120 degrees range of motion of the left knee. Ligamentously stable.The incision is well-healed. There is mild effusion on exam today. There is no erythema. No warmth. There is diffuse tenderness to palpation.\par \par Right Knee:\par \par Examination of the involved knee was performed inclusive of the following tests:\par \par Inspection:  effusion,quadriceps atrophy, skin\par \par Gait: stride length, john, heel strike\par \par Range of Motion: active and passive with comparison to contralateral knee\par \par Strength Testing: flexion and extention\par \par Tenderness Evaluation: medial and lateral joint line, medial and lateral patellar facet, quadriceps and patellar tendon, hamstring, pes anserinus\par \par Stability: varus and valgus at 0 and 30 degrees (1-3+), Lachman (1A unless noted),  anterior drawer (1-3+), posterior drawer (1-3+), pivot-shift (normal, glide, shift)\par \par Meniscus: Reginaldo Live\par \par Patellofemoral: patellar grind, patellar compression, tracking, J-sign, tilt, test, apprehension, medial and lateral translation (2 quadrants unless otherwise noted)\par \par Abnormal findings were as follows:\par Right knee examination demonstrates range of motion from 5° to 120° today with a slight flexion contracture. Positive patellar compression and grind. Positive effusion. Positive tenderness to palpation of the medial and lateral joint line. Pain with Calos's both medially and laterally. Ligamentously stable [de-identified] : 6-28-19: \par New imaging: weightbearing X-Rays were performed of the bilateft side demonstrate total knee arthroplasty which is an ingrowth  Position and alignment within acceptable limits. Appropriately sized. There is some inset positioning of the tibia tray relative given the ingrowth nature of this implant this is within acceptable limits. There is some lucency around the medial lateral periphery of the tibial baseplate but some evidence of early ingrowth on the baseplate until. No effusion. No other lucencies.\par \par Right knee x-ray images demonstrate moderate osteoarthritis with predominant patellofemoral involvement. There  is sclerosis and narrowing.\par

## 2019-09-06 NOTE — DISCUSSION/SUMMARY
[de-identified] : Patient was once again counseled regarding her right knee.  Repeat Euflexxa injection was performed.  This may be repeated as a series in 6 months.  Patient is currently satisfied with improvement to date.  If it wears off or she would like to discuss additional treatment options prior to that timeframe she may return for discussion..\par \par With regards to the left knee, constipation once again that with 1 of 2 inflammatory markers positive and and with her history of IBS it is unclear whether this is a true positive result or a secondary finding.  Options this point are to repeat ESR and CRP or potentially to perform an aspiration culture of the left knee.  I did  her today that frequently if an aspiration and culture is desired by the surgeon performing a revision total joint arthroplasty they frequently would like this to be performed at their home hospital.  As I would not be the surgeon performing the revision knee arthroplasty provide her several names for additional providers who may be able to evaluate her and consider this aspiration at her request.

## 2019-09-06 NOTE — REASON FOR VISIT
[Follow-Up Visit] : a follow-up visit for [FreeTextEntry2] : Follow-up right knee osteoarthritis, painful left total knee arthroplasty

## 2019-09-24 ENCOUNTER — APPOINTMENT (OUTPATIENT)
Dept: ORTHOPEDIC SURGERY | Facility: CLINIC | Age: 59
End: 2019-09-24
Payer: OTHER MISCELLANEOUS

## 2019-09-24 VITALS
OXYGEN SATURATION: 99 % | HEART RATE: 76 BPM | HEIGHT: 64 IN | WEIGHT: 248 LBS | SYSTOLIC BLOOD PRESSURE: 120 MMHG | BODY MASS INDEX: 42.34 KG/M2 | DIASTOLIC BLOOD PRESSURE: 80 MMHG

## 2019-09-24 PROCEDURE — 99214 OFFICE O/P EST MOD 30 MIN: CPT | Mod: 25

## 2019-09-24 PROCEDURE — 20553 NJX 1/MLT TRIGGER POINTS 3/>: CPT | Mod: LT

## 2019-09-26 NOTE — PHYSICAL EXAM
[de-identified] : Left Knee: skin is intact, no erythema, surgical scar is well healed on anterior knee.  no joint effusion. + medial joint line tenderness, PCL is intact with posterior drawer,  negative lachman, no collateral ligamentous laxity with stress. ROM is 0-100 degrees with no pain. DF/PF/EHL intact with 5/5 strength. 2+ DP pulse. SILT L3-S1.\par  [de-identified] : xray left knee - well aligned TKA components with patella resurfaced, cementless implants with no sign of fracture. lucency around femoral and tibial components but no obvious acute loosening.

## 2019-09-26 NOTE — HISTORY OF PRESENT ILLNESS
[Stable] : stable [5] : an average pain level of 5/10 [de-identified] : new patient for left knee pain since her left TKA 1 year ago at [facility]. she has never been pain free.  her surgery was done originally for work comp after a fall. she complains of pain and swelling in the area. she has done 10 months of physical therapy which has not helped her pain. she has not had any injections in her left knee since surgery. Dr. Galdamez has been seeing her recently. He did CRP and ESR which showed a mildly elevated CRP at .7 and most recently a normal ESR. she has never had drainage or indications of infection. she denies any instability or mechanical symptoms.

## 2019-09-26 NOTE — ASSESSMENT
[FreeTextEntry1] : 60yo female with left knee pain S/P Left TKA 1 year prior at [facility]\par \par Trigger point injections with local and steroid in 2 places on medial left knee\par Follow up in 2 weeks to assess trigger points and knee pain.\par \par \par All medical record entries made by the PA/Tessaibravi/Fellow are at my, Dr. Velasquez Chatman's direction and personally dictated by me on 09/24/2019]. I have reviewed the chart and agree that the record accurately reflects my personal performance of the history, physical exam, assessment, and plan. I have also personally directed reviewed, and agreed with the chart.\par

## 2019-10-07 ENCOUNTER — APPOINTMENT (OUTPATIENT)
Dept: ORTHOPEDIC SURGERY | Facility: CLINIC | Age: 59
End: 2019-10-07
Payer: OTHER MISCELLANEOUS

## 2019-10-07 PROCEDURE — 20610 DRAIN/INJ JOINT/BURSA W/O US: CPT | Mod: LT

## 2019-10-07 PROCEDURE — 99213 OFFICE O/P EST LOW 20 MIN: CPT | Mod: 24

## 2019-10-22 ENCOUNTER — OTHER (OUTPATIENT)
Age: 59
End: 2019-10-22

## 2019-10-22 DIAGNOSIS — N95.0 POSTMENOPAUSAL BLEEDING: ICD-10-CM

## 2019-10-22 NOTE — ASSESSMENT
[FreeTextEntry1] : Assessment/Plan\par \par 59 year old female with left knee pain S/P left TKA 1 year prior at [facility]par \par Trigger point injections with local and steroid in 2 places on medial left knee.\par Follow up in 2 weeks to assess trigger points and knee pain.\par \par All medical record entries made by the PA/Tessaibravi/Fellow are at my, Dr. Velasquez Chatman's direction and personally dictated by me on 10/07/2019]. I have reviewed the chart and agree that the record accurately reflects my personal performance of the history, physical exam, assessment, and plan. I have also personally directed reviewed, and agreed with the chart.\par

## 2019-10-22 NOTE — PROCEDURE
[de-identified] : After obtaining verbal consent and under normal sterile conditions the left knee joint was injected with 4 cc of lidocaine and 1 cc of 40 mg per mL of Kenalog.

## 2019-10-22 NOTE — HISTORY OF PRESENT ILLNESS
[de-identified] : Patient is a 59 year old female following up from injections 2 weeks ago. She previously had 8/10 pain, now she has 3-4/10 pain globally, but two tender trigger points medially.

## 2019-10-28 ENCOUNTER — APPOINTMENT (OUTPATIENT)
Dept: ANTEPARTUM | Facility: CLINIC | Age: 59
End: 2019-10-28
Payer: COMMERCIAL

## 2019-10-28 ENCOUNTER — ASOB RESULT (OUTPATIENT)
Age: 59
End: 2019-10-28

## 2019-10-28 ENCOUNTER — OUTPATIENT (OUTPATIENT)
Dept: OUTPATIENT SERVICES | Facility: HOSPITAL | Age: 59
LOS: 1 days | Discharge: HOME | End: 2019-10-28

## 2019-10-28 PROCEDURE — 76830 TRANSVAGINAL US NON-OB: CPT | Mod: 26

## 2019-10-28 PROCEDURE — 76856 US EXAM PELVIC COMPLETE: CPT | Mod: 26

## 2019-10-30 ENCOUNTER — APPOINTMENT (OUTPATIENT)
Dept: ORTHOPEDIC SURGERY | Facility: CLINIC | Age: 59
End: 2019-10-30
Payer: OTHER MISCELLANEOUS

## 2019-10-30 PROCEDURE — 99213 OFFICE O/P EST LOW 20 MIN: CPT

## 2019-11-11 ENCOUNTER — FORM ENCOUNTER (OUTPATIENT)
Age: 59
End: 2019-11-11

## 2019-11-12 ENCOUNTER — OUTPATIENT (OUTPATIENT)
Dept: OUTPATIENT SERVICES | Facility: HOSPITAL | Age: 59
LOS: 1 days | Discharge: HOME | End: 2019-11-12
Payer: COMMERCIAL

## 2019-11-12 VITALS
TEMPERATURE: 98 F | OXYGEN SATURATION: 100 % | SYSTOLIC BLOOD PRESSURE: 148 MMHG | RESPIRATION RATE: 20 BRPM | WEIGHT: 253.53 LBS | HEIGHT: 64 IN | HEART RATE: 72 BPM | DIASTOLIC BLOOD PRESSURE: 74 MMHG

## 2019-11-12 DIAGNOSIS — Z98.890 OTHER SPECIFIED POSTPROCEDURAL STATES: Chronic | ICD-10-CM

## 2019-11-12 DIAGNOSIS — Z01.818 ENCOUNTER FOR OTHER PREPROCEDURAL EXAMINATION: ICD-10-CM

## 2019-11-12 DIAGNOSIS — N95.0 POSTMENOPAUSAL BLEEDING: ICD-10-CM

## 2019-11-12 DIAGNOSIS — Z96.652 PRESENCE OF LEFT ARTIFICIAL KNEE JOINT: Chronic | ICD-10-CM

## 2019-11-12 DIAGNOSIS — Z90.49 ACQUIRED ABSENCE OF OTHER SPECIFIED PARTS OF DIGESTIVE TRACT: Chronic | ICD-10-CM

## 2019-11-12 LAB
ALBUMIN SERPL ELPH-MCNC: 4.1 G/DL — SIGNIFICANT CHANGE UP (ref 3.5–5.2)
ALP SERPL-CCNC: 74 U/L — SIGNIFICANT CHANGE UP (ref 30–115)
ALT FLD-CCNC: 20 U/L — SIGNIFICANT CHANGE UP (ref 0–41)
ANION GAP SERPL CALC-SCNC: 18 MMOL/L — HIGH (ref 7–14)
APPEARANCE UR: CLEAR — SIGNIFICANT CHANGE UP
APTT BLD: 36.6 SEC — SIGNIFICANT CHANGE UP (ref 27–39.2)
AST SERPL-CCNC: 14 U/L — SIGNIFICANT CHANGE UP (ref 0–41)
BACTERIA # UR AUTO: ABNORMAL
BASOPHILS # BLD AUTO: 0.03 K/UL — SIGNIFICANT CHANGE UP (ref 0–0.2)
BASOPHILS NFR BLD AUTO: 0.4 % — SIGNIFICANT CHANGE UP (ref 0–1)
BILIRUB SERPL-MCNC: 0.6 MG/DL — SIGNIFICANT CHANGE UP (ref 0.2–1.2)
BILIRUB UR-MCNC: NEGATIVE — SIGNIFICANT CHANGE UP
BLD GP AB SCN SERPL QL: SIGNIFICANT CHANGE UP
BUN SERPL-MCNC: 20 MG/DL — SIGNIFICANT CHANGE UP (ref 10–20)
CALCIUM SERPL-MCNC: 8.9 MG/DL — SIGNIFICANT CHANGE UP (ref 8.5–10.1)
CHLORIDE SERPL-SCNC: 101 MMOL/L — SIGNIFICANT CHANGE UP (ref 98–110)
CO2 SERPL-SCNC: 25 MMOL/L — SIGNIFICANT CHANGE UP (ref 17–32)
COLOR SPEC: YELLOW — SIGNIFICANT CHANGE UP
CREAT SERPL-MCNC: 0.7 MG/DL — SIGNIFICANT CHANGE UP (ref 0.7–1.5)
DIFF PNL FLD: NEGATIVE — SIGNIFICANT CHANGE UP
EOSINOPHIL # BLD AUTO: 0.05 K/UL — SIGNIFICANT CHANGE UP (ref 0–0.7)
EOSINOPHIL NFR BLD AUTO: 0.6 % — SIGNIFICANT CHANGE UP (ref 0–8)
EPI CELLS # UR: 5 /HPF — SIGNIFICANT CHANGE UP (ref 0–5)
GLUCOSE SERPL-MCNC: 82 MG/DL — SIGNIFICANT CHANGE UP (ref 70–99)
GLUCOSE UR QL: NEGATIVE — SIGNIFICANT CHANGE UP
HCT VFR BLD CALC: 39.6 % — SIGNIFICANT CHANGE UP (ref 37–47)
HGB BLD-MCNC: 12.1 G/DL — SIGNIFICANT CHANGE UP (ref 12–16)
HYALINE CASTS # UR AUTO: 1 /LPF — SIGNIFICANT CHANGE UP (ref 0–7)
IMM GRANULOCYTES NFR BLD AUTO: 0.8 % — HIGH (ref 0.1–0.3)
INR BLD: 1.02 RATIO — SIGNIFICANT CHANGE UP (ref 0.65–1.3)
KETONES UR-MCNC: NEGATIVE — SIGNIFICANT CHANGE UP
LEUKOCYTE ESTERASE UR-ACNC: ABNORMAL
LYMPHOCYTES # BLD AUTO: 1.06 K/UL — LOW (ref 1.2–3.4)
LYMPHOCYTES # BLD AUTO: 12.6 % — LOW (ref 20.5–51.1)
MCHC RBC-ENTMCNC: 20.4 PG — LOW (ref 27–31)
MCHC RBC-ENTMCNC: 30.6 G/DL — LOW (ref 32–37)
MCV RBC AUTO: 66.7 FL — LOW (ref 81–99)
MONOCYTES # BLD AUTO: 0.55 K/UL — SIGNIFICANT CHANGE UP (ref 0.1–0.6)
MONOCYTES NFR BLD AUTO: 6.5 % — SIGNIFICANT CHANGE UP (ref 1.7–9.3)
NEUTROPHILS # BLD AUTO: 6.67 K/UL — HIGH (ref 1.4–6.5)
NEUTROPHILS NFR BLD AUTO: 79.1 % — HIGH (ref 42.2–75.2)
NITRITE UR-MCNC: NEGATIVE — SIGNIFICANT CHANGE UP
NRBC # BLD: 0 /100 WBCS — SIGNIFICANT CHANGE UP (ref 0–0)
PH UR: 6 — SIGNIFICANT CHANGE UP (ref 5–8)
PLATELET # BLD AUTO: 314 K/UL — SIGNIFICANT CHANGE UP (ref 130–400)
POTASSIUM SERPL-MCNC: 4 MMOL/L — SIGNIFICANT CHANGE UP (ref 3.5–5)
POTASSIUM SERPL-SCNC: 4 MMOL/L — SIGNIFICANT CHANGE UP (ref 3.5–5)
PROT SERPL-MCNC: 6.8 G/DL — SIGNIFICANT CHANGE UP (ref 6–8)
PROT UR-MCNC: SIGNIFICANT CHANGE UP
PROTHROM AB SERPL-ACNC: 11.7 SEC — SIGNIFICANT CHANGE UP (ref 9.95–12.87)
RBC # BLD: 5.94 M/UL — HIGH (ref 4.2–5.4)
RBC # FLD: 18.9 % — HIGH (ref 11.5–14.5)
RBC CASTS # UR COMP ASSIST: 2 /HPF — SIGNIFICANT CHANGE UP (ref 0–4)
SODIUM SERPL-SCNC: 144 MMOL/L — SIGNIFICANT CHANGE UP (ref 135–146)
SP GR SPEC: 1.03 — HIGH (ref 1.01–1.02)
UROBILINOGEN FLD QL: SIGNIFICANT CHANGE UP
WBC # BLD: 8.43 K/UL — SIGNIFICANT CHANGE UP (ref 4.8–10.8)
WBC # FLD AUTO: 8.43 K/UL — SIGNIFICANT CHANGE UP (ref 4.8–10.8)
WBC UR QL: 7 /HPF — HIGH (ref 0–5)

## 2019-11-12 PROCEDURE — 71046 X-RAY EXAM CHEST 2 VIEWS: CPT | Mod: 26

## 2019-11-12 PROCEDURE — 93010 ELECTROCARDIOGRAM REPORT: CPT

## 2019-11-12 NOTE — H&P PST ADULT - NSICDXPASTMEDICALHX_GEN_ALL_CORE_FT
PAST MEDICAL HISTORY:  Fibromyalgia     GERD (gastroesophageal reflux disease)     H/O melanoma excision     HTN (hypertension)     Hypothyroidism     Irritable bowel disease     Lung nodule     Migraines

## 2019-11-12 NOTE — H&P PST ADULT - HISTORY OF PRESENT ILLNESS
60 y/o female scheduled for robotic assisted total lap hysterectomy  reports no c/o cp,sob,palpitations,cough or dysuria  1-2 fos without sob

## 2019-11-12 NOTE — H&P PST ADULT - NSICDXPASTSURGICALHX_GEN_ALL_CORE_FT
PAST SURGICAL HISTORY:  History of D&C     History of left knee replacement     S/P laparoscopic cholecystectomy     S/P thyroidectomy

## 2019-11-15 NOTE — HISTORY OF PRESENT ILLNESS
[Stable] : stable [4] : an average pain level of 4/10 [de-identified] : left knee pain TKA put in by [facility] one year prior. she had 2 trigger point injections 3 weeks ago and it helped her pain. no acute events.

## 2019-11-15 NOTE — ASSESSMENT
[FreeTextEntry1] : 58yo female with left knee pain\par \par trigger point injections x2 today\par home exercises\par pain control PRN\par follow up in 6 weeks PRN\par \par \par All medical record entries made by the PA/Scribe/Fellow are at my, Dr. Velasquez Chatman's direction and personally dictated by me on 10/30/2019]. I have reviewed the chart and agree that the record accurately reflects my personal performance of the history, physical exam, assessment, and plan. I have also personally directed reviewed, and agreed with the chart.\par

## 2019-11-15 NOTE — PHYSICAL EXAM
[de-identified] : Musculoskeletal:. Left Knee: skin is intact, no erythema, surgical scar is well healed on anterior knee. no joint effusion. + medial joint line tenderness, PCL is intact with posterior drawer, negative Lachman, no collateral ligamentous laxity with stress. ROM is 0-100 degrees with no pain. DF/PF/EHL intact with 5/5 strength. 2+ DP pulse. SILT L3-S1.

## 2019-11-19 ENCOUNTER — INPATIENT (INPATIENT)
Facility: HOSPITAL | Age: 59
LOS: 0 days | Discharge: HOME | End: 2019-11-20
Attending: OBSTETRICS & GYNECOLOGY | Admitting: OBSTETRICS & GYNECOLOGY
Payer: COMMERCIAL

## 2019-11-19 ENCOUNTER — RESULT REVIEW (OUTPATIENT)
Age: 59
End: 2019-11-19

## 2019-11-19 VITALS
RESPIRATION RATE: 18 BRPM | HEART RATE: 85 BPM | TEMPERATURE: 98 F | SYSTOLIC BLOOD PRESSURE: 134 MMHG | HEIGHT: 64 IN | WEIGHT: 248.02 LBS | DIASTOLIC BLOOD PRESSURE: 71 MMHG

## 2019-11-19 DIAGNOSIS — Z90.49 ACQUIRED ABSENCE OF OTHER SPECIFIED PARTS OF DIGESTIVE TRACT: Chronic | ICD-10-CM

## 2019-11-19 DIAGNOSIS — Z98.890 OTHER SPECIFIED POSTPROCEDURAL STATES: Chronic | ICD-10-CM

## 2019-11-19 DIAGNOSIS — Z96.652 PRESENCE OF LEFT ARTIFICIAL KNEE JOINT: Chronic | ICD-10-CM

## 2019-11-19 LAB
ABO RH CONFIRMATION: SIGNIFICANT CHANGE UP
ANION GAP SERPL CALC-SCNC: 13 MMOL/L — SIGNIFICANT CHANGE UP (ref 7–14)
BASOPHILS # BLD AUTO: 0.02 K/UL — SIGNIFICANT CHANGE UP (ref 0–0.2)
BASOPHILS NFR BLD AUTO: 0.1 % — SIGNIFICANT CHANGE UP (ref 0–1)
BUN SERPL-MCNC: 15 MG/DL — SIGNIFICANT CHANGE UP (ref 10–20)
CALCIUM SERPL-MCNC: 8.1 MG/DL — LOW (ref 8.5–10.1)
CHLORIDE SERPL-SCNC: 104 MMOL/L — SIGNIFICANT CHANGE UP (ref 98–110)
CO2 SERPL-SCNC: 25 MMOL/L — SIGNIFICANT CHANGE UP (ref 17–32)
CREAT SERPL-MCNC: 0.7 MG/DL — SIGNIFICANT CHANGE UP (ref 0.7–1.5)
EOSINOPHIL # BLD AUTO: 0 K/UL — SIGNIFICANT CHANGE UP (ref 0–0.7)
EOSINOPHIL NFR BLD AUTO: 0 % — SIGNIFICANT CHANGE UP (ref 0–8)
GLUCOSE BLDC GLUCOMTR-MCNC: 86 MG/DL — SIGNIFICANT CHANGE UP (ref 70–99)
GLUCOSE SERPL-MCNC: 118 MG/DL — HIGH (ref 70–99)
HCT VFR BLD CALC: 34.3 % — LOW (ref 37–47)
HGB BLD-MCNC: 10.8 G/DL — LOW (ref 12–16)
IMM GRANULOCYTES NFR BLD AUTO: 0.5 % — HIGH (ref 0.1–0.3)
LYMPHOCYTES # BLD AUTO: 0.66 K/UL — LOW (ref 1.2–3.4)
LYMPHOCYTES # BLD AUTO: 4.5 % — LOW (ref 20.5–51.1)
MAGNESIUM SERPL-MCNC: 1.6 MG/DL — LOW (ref 1.8–2.4)
MCHC RBC-ENTMCNC: 20.8 PG — LOW (ref 27–31)
MCHC RBC-ENTMCNC: 31.5 G/DL — LOW (ref 32–37)
MCV RBC AUTO: 66.1 FL — LOW (ref 81–99)
MONOCYTES # BLD AUTO: 0.48 K/UL — SIGNIFICANT CHANGE UP (ref 0.1–0.6)
MONOCYTES NFR BLD AUTO: 3.3 % — SIGNIFICANT CHANGE UP (ref 1.7–9.3)
NEUTROPHILS # BLD AUTO: 13.35 K/UL — HIGH (ref 1.4–6.5)
NEUTROPHILS NFR BLD AUTO: 91.6 % — HIGH (ref 42.2–75.2)
NRBC # BLD: 0 /100 WBCS — SIGNIFICANT CHANGE UP (ref 0–0)
PHOSPHATE SERPL-MCNC: 3.8 MG/DL — SIGNIFICANT CHANGE UP (ref 2.1–4.9)
PLATELET # BLD AUTO: 300 K/UL — SIGNIFICANT CHANGE UP (ref 130–400)
POTASSIUM SERPL-MCNC: 4.1 MMOL/L — SIGNIFICANT CHANGE UP (ref 3.5–5)
POTASSIUM SERPL-SCNC: 4.1 MMOL/L — SIGNIFICANT CHANGE UP (ref 3.5–5)
RBC # BLD: 5.19 M/UL — SIGNIFICANT CHANGE UP (ref 4.2–5.4)
RBC # FLD: 17.7 % — HIGH (ref 11.5–14.5)
SODIUM SERPL-SCNC: 142 MMOL/L — SIGNIFICANT CHANGE UP (ref 135–146)
WBC # BLD: 14.59 K/UL — HIGH (ref 4.8–10.8)
WBC # FLD AUTO: 14.59 K/UL — HIGH (ref 4.8–10.8)

## 2019-11-19 PROCEDURE — 58571 TLH W/T/O 250 G OR LESS: CPT

## 2019-11-19 PROCEDURE — 99222 1ST HOSP IP/OBS MODERATE 55: CPT | Mod: 25

## 2019-11-19 PROCEDURE — 88307 TISSUE EXAM BY PATHOLOGIST: CPT | Mod: 26

## 2019-11-19 PROCEDURE — 74018 RADEX ABDOMEN 1 VIEW: CPT | Mod: 26

## 2019-11-19 PROCEDURE — 52000 CYSTOURETHROSCOPY: CPT | Mod: 59

## 2019-11-19 PROCEDURE — 52332 CYSTOSCOPY AND TREATMENT: CPT | Mod: LT

## 2019-11-19 RX ORDER — ONDANSETRON 8 MG/1
4 TABLET, FILM COATED ORAL ONCE
Refills: 0 | Status: COMPLETED | OUTPATIENT
Start: 2019-11-19 | End: 2019-11-19

## 2019-11-19 RX ORDER — LEVOTHYROXINE SODIUM 125 MCG
250 TABLET ORAL DAILY
Refills: 0 | Status: DISCONTINUED | OUTPATIENT
Start: 2019-11-19 | End: 2019-11-20

## 2019-11-19 RX ORDER — CEFAZOLIN SODIUM 1 G
3000 VIAL (EA) INJECTION ONCE
Refills: 0 | Status: COMPLETED | OUTPATIENT
Start: 2019-11-19 | End: 2019-11-19

## 2019-11-19 RX ORDER — NIFEDIPINE 30 MG
30 TABLET, EXTENDED RELEASE 24 HR ORAL DAILY
Refills: 0 | Status: DISCONTINUED | OUTPATIENT
Start: 2019-11-19 | End: 2019-11-20

## 2019-11-19 RX ORDER — ACETAMINOPHEN 500 MG
650 TABLET ORAL EVERY 6 HOURS
Refills: 0 | Status: DISCONTINUED | OUTPATIENT
Start: 2019-11-19 | End: 2019-11-20

## 2019-11-19 RX ORDER — METOPROLOL TARTRATE 50 MG
25 TABLET ORAL DAILY
Refills: 0 | Status: DISCONTINUED | OUTPATIENT
Start: 2019-11-19 | End: 2019-11-20

## 2019-11-19 RX ORDER — HYDROCHLOROTHIAZIDE 25 MG
25 TABLET ORAL DAILY
Refills: 0 | Status: DISCONTINUED | OUTPATIENT
Start: 2019-11-19 | End: 2019-11-20

## 2019-11-19 RX ORDER — ALPRAZOLAM 0.25 MG
0.5 TABLET ORAL DAILY
Refills: 0 | Status: DISCONTINUED | OUTPATIENT
Start: 2019-11-19 | End: 2019-11-20

## 2019-11-19 RX ORDER — SENNA PLUS 8.6 MG/1
2 TABLET ORAL AT BEDTIME
Refills: 0 | Status: DISCONTINUED | OUTPATIENT
Start: 2019-11-19 | End: 2019-11-20

## 2019-11-19 RX ORDER — KETOROLAC TROMETHAMINE 30 MG/ML
30 SYRINGE (ML) INJECTION ONCE
Refills: 0 | Status: DISCONTINUED | OUTPATIENT
Start: 2019-11-19 | End: 2019-11-19

## 2019-11-19 RX ORDER — POTASSIUM CHLORIDE 20 MEQ
10 PACKET (EA) ORAL DAILY
Refills: 0 | Status: DISCONTINUED | OUTPATIENT
Start: 2019-11-19 | End: 2019-11-20

## 2019-11-19 RX ORDER — SIMETHICONE 80 MG/1
80 TABLET, CHEWABLE ORAL EVERY 8 HOURS
Refills: 0 | Status: DISCONTINUED | OUTPATIENT
Start: 2019-11-19 | End: 2019-11-20

## 2019-11-19 RX ORDER — HYDROMORPHONE HYDROCHLORIDE 2 MG/ML
0.5 INJECTION INTRAMUSCULAR; INTRAVENOUS; SUBCUTANEOUS
Refills: 0 | Status: DISCONTINUED | OUTPATIENT
Start: 2019-11-19 | End: 2019-11-19

## 2019-11-19 RX ORDER — IBUPROFEN 200 MG
600 TABLET ORAL EVERY 6 HOURS
Refills: 0 | Status: DISCONTINUED | OUTPATIENT
Start: 2019-11-19 | End: 2019-11-20

## 2019-11-19 RX ORDER — HYDROMORPHONE HYDROCHLORIDE 2 MG/ML
1 INJECTION INTRAMUSCULAR; INTRAVENOUS; SUBCUTANEOUS
Refills: 0 | Status: DISCONTINUED | OUTPATIENT
Start: 2019-11-19 | End: 2019-11-19

## 2019-11-19 RX ORDER — SODIUM CHLORIDE 9 MG/ML
1000 INJECTION, SOLUTION INTRAVENOUS
Refills: 0 | Status: DISCONTINUED | OUTPATIENT
Start: 2019-11-19 | End: 2019-11-19

## 2019-11-19 RX ORDER — FAMOTIDINE 10 MG/ML
20 INJECTION INTRAVENOUS DAILY
Refills: 0 | Status: DISCONTINUED | OUTPATIENT
Start: 2019-11-19 | End: 2019-11-20

## 2019-11-19 RX ORDER — MEPERIDINE HYDROCHLORIDE 50 MG/ML
12.5 INJECTION INTRAMUSCULAR; INTRAVENOUS; SUBCUTANEOUS
Refills: 0 | Status: DISCONTINUED | OUTPATIENT
Start: 2019-11-19 | End: 2019-11-19

## 2019-11-19 RX ORDER — GABAPENTIN 400 MG/1
300 CAPSULE ORAL EVERY 8 HOURS
Refills: 0 | Status: DISCONTINUED | OUTPATIENT
Start: 2019-11-19 | End: 2019-11-20

## 2019-11-19 RX ORDER — ONDANSETRON 8 MG/1
4 TABLET, FILM COATED ORAL EVERY 4 HOURS
Refills: 0 | Status: DISCONTINUED | OUTPATIENT
Start: 2019-11-19 | End: 2019-11-20

## 2019-11-19 RX ORDER — SODIUM CHLORIDE 9 MG/ML
1000 INJECTION, SOLUTION INTRAVENOUS
Refills: 0 | Status: DISCONTINUED | OUTPATIENT
Start: 2019-11-19 | End: 2019-11-20

## 2019-11-19 RX ADMIN — Medication 650 MILLIGRAM(S): at 21:23

## 2019-11-19 RX ADMIN — ONDANSETRON 4 MILLIGRAM(S): 8 TABLET, FILM COATED ORAL at 20:42

## 2019-11-19 RX ADMIN — SODIUM CHLORIDE 120 MILLILITER(S): 9 INJECTION, SOLUTION INTRAVENOUS at 14:47

## 2019-11-19 RX ADMIN — Medication 650 MILLIGRAM(S): at 21:20

## 2019-11-19 RX ADMIN — Medication 600 MILLIGRAM(S): at 23:57

## 2019-11-19 RX ADMIN — GABAPENTIN 300 MILLIGRAM(S): 400 CAPSULE ORAL at 21:20

## 2019-11-19 RX ADMIN — SIMETHICONE 80 MILLIGRAM(S): 80 TABLET, CHEWABLE ORAL at 21:20

## 2019-11-19 RX ADMIN — ONDANSETRON 4 MILLIGRAM(S): 8 TABLET, FILM COATED ORAL at 16:22

## 2019-11-19 RX ADMIN — Medication 600 MILLIGRAM(S): at 23:58

## 2019-11-19 RX ADMIN — Medication 600 MILLIGRAM(S): at 17:51

## 2019-11-19 RX ADMIN — Medication 30 MILLIGRAM(S): at 17:21

## 2019-11-19 RX ADMIN — Medication 200 MILLIGRAM(S): at 16:06

## 2019-11-19 NOTE — BRIEF OPERATIVE NOTE - NSICDXBRIEFPROCEDURE_GEN_ALL_CORE_FT
PROCEDURES:  Cystourethroscopy, with ureteral stent insertion, adult 19-Nov-2019 14:14:33 intra-op Velasquez BHAKTA
PROCEDURES:  Repair, laceration, labia or vagina 19-Nov-2019 14:56:39  Marie Seaman  Bilateral salpingoophorectomy 19-Nov-2019 14:56:24  Marie Seaman  Hysterectomy, total, radical, robot-assisted, laparoscopic 19-Nov-2019 14:56:16  Marie Seaman

## 2019-11-19 NOTE — BRIEF OPERATIVE NOTE - NSICDXBRIEFPOSTOP_GEN_ALL_CORE_FT
POST-OP DIAGNOSIS:  Intraoperative ureteral injury 19-Nov-2019 14:16:12 none demonstrated Velasquez BHAKTA
POST-OP DIAGNOSIS:  Uterine polyp 19-Nov-2019 14:57:31  Marie Seaman  Postmenopausal bleeding 19-Nov-2019 14:57:25  Marie Seaman

## 2019-11-19 NOTE — BRIEF OPERATIVE NOTE - NSICDXBRIEFPREOP_GEN_ALL_CORE_FT
PRE-OP DIAGNOSIS:  Intraoperative ureteral injury 19-Nov-2019 14:15:36 none  demonstrated Velasquez BHAKTA
PRE-OP DIAGNOSIS:  Uterine polyp 19-Nov-2019 14:57:14  Marie Seaman  Postmenopausal bleeding 19-Nov-2019 14:57:06  Marie Seaman

## 2019-11-19 NOTE — CHART NOTE - NSCHARTNOTEFT_GEN_A_CORE
PACU ANESTHESIA ADMISSION NOTE      Procedure: Cystourethroscopy, with ureteral stent insertion, adult: intra-op    Post op diagnosis:  Intraoperative ureteral injury      ____  Intubated  TV:______       Rate: ______      FiO2: ______    _x___  Patent Airway    _x___  Full return of protective reflexes    _x___  Full recovery from anesthesia / back to baseline status    Vitals:  T(C): 36.9 (  HR: 102  BP: 165/80  RR: 18   SpO2: 99 --    Mental Status:  _x___ Awake   _____ Alert   _____ Drowsy   _____ Sedated    Nausea/Vomiting:  _x___  NO       ______Yes,   See Post - Op Orders         Pain Scale (0-10):  __0___    Treatment: _x___ None    ____ See Post - Op/PCA Orders    Post - Operative Fluids:   __x__ Oral   ____ See Post - Op Orders    Plan: Discharge:   ____Home       __x ___Floor     _____Critical Care    _____  Other:_________________    Comments:  No anesthesia issues or complications noted.  Discharge when criteria met.

## 2019-11-19 NOTE — BRIEF OPERATIVE NOTE - OPERATION/FINDINGS
Robotic Assisted Total Laparoscopic Hysterectomy, Bilateral Salpingo-oopherectomy, diagnostic cystoscopy. Findings: Uterus mildly enlarged with small fibroids. Both tubes and ovaries normal. On cystoscopy - normal efflux from right UO. Absent efflux from left UO. No evidence of uretral injury noted on laparoscopy. Intraoperative Urollogy consult called - Dr. Bryson - who insert a left uretral stent without difficulty under laparoscopic guidance. Uterus mildly enlarged with small fibroids. Both tubes and ovaries normal. On cystoscopy - normal efflux from right UO. Absent efflux from left UO. No evidence of ureteral injury noted on laparoscopy. Intraoperative Urology consult called - Dr. Bryson - who insert a left ureteral stent without difficulty under laparoscopic guidance. Uterus mildly enlarged with small fibroids. Both fallopian tubes and ovaries appeared normal. On cystoscopy - normal efflux from right UO. Absent efflux from left UO. No evidence of ureteral injury noted on laparoscopy. Intraoperative Urology consult called - Dr. Bryson - who insert a left ureteral stent without difficulty under laparoscopic guidance.

## 2019-11-19 NOTE — BRIEF OPERATIVE NOTE - COMMENTS
Complicated by absent efflux from left UO. No evidence of ureteral injury noted on laparoscopy. Intraoperative Urology consult called - Dr. Bryson - who insert a left ureteral stent without difficulty under laparoscopic guidance.  Dictation # Complicated by absent efflux from left UO. No evidence of ureteral injury noted on laparoscopy. Intraoperative Urology consult called - Dr. Bryson - who insert a left ureteral stent without difficulty under laparoscopic guidance.  Dictation # [id number]

## 2019-11-19 NOTE — ASU PATIENT PROFILE, ADULT - PROVIDER NOTIFICATION
Pre-Operative Diagnosis: Failure to thrive     Post-Operative Diagnosis: same      Procedure Performed:   Procedure(s):  ESOPHAGOGASTRODUODENOSCOPY with biopsies    Surgeon(s) and Role:     Forest Maza MD - Primary    Assistant(s):        Joe Menjivar
Declines

## 2019-11-19 NOTE — ASU PATIENT PROFILE, ADULT - PMH
Fibromyalgia    GERD (gastroesophageal reflux disease)    H/O melanoma excision    HTN (hypertension)    Hypothyroidism    Irritable bowel disease    Lung nodule    Migraines

## 2019-11-19 NOTE — ASU PATIENT PROFILE, ADULT - PSH
History of D&C    History of left knee replacement    S/P laparoscopic cholecystectomy    S/P thyroidectomy

## 2019-11-20 ENCOUNTER — TRANSCRIPTION ENCOUNTER (OUTPATIENT)
Age: 59
End: 2019-11-20

## 2019-11-20 VITALS — DIASTOLIC BLOOD PRESSURE: 79 MMHG | HEART RATE: 69 BPM | SYSTOLIC BLOOD PRESSURE: 116 MMHG

## 2019-11-20 PROBLEM — K21.9 GASTRO-ESOPHAGEAL REFLUX DISEASE WITHOUT ESOPHAGITIS: Chronic | Status: ACTIVE | Noted: 2019-11-12

## 2019-11-20 PROBLEM — K58.9 IRRITABLE BOWEL SYNDROME, UNSPECIFIED: Chronic | Status: ACTIVE | Noted: 2019-11-12

## 2019-11-20 PROBLEM — Z98.890 OTHER SPECIFIED POSTPROCEDURAL STATES: Chronic | Status: ACTIVE | Noted: 2019-11-12

## 2019-11-20 PROBLEM — G43.909 MIGRAINE, UNSPECIFIED, NOT INTRACTABLE, WITHOUT STATUS MIGRAINOSUS: Chronic | Status: ACTIVE | Noted: 2019-11-12

## 2019-11-20 PROBLEM — M79.7 FIBROMYALGIA: Chronic | Status: ACTIVE | Noted: 2019-11-12

## 2019-11-20 PROBLEM — R91.1 SOLITARY PULMONARY NODULE: Chronic | Status: ACTIVE | Noted: 2019-11-12

## 2019-11-20 PROBLEM — E03.9 HYPOTHYROIDISM, UNSPECIFIED: Chronic | Status: ACTIVE | Noted: 2019-11-12

## 2019-11-20 PROBLEM — I10 ESSENTIAL (PRIMARY) HYPERTENSION: Chronic | Status: ACTIVE | Noted: 2019-11-12

## 2019-11-20 PROBLEM — K58.9 IRRITABLE BOWEL SYNDROME WITHOUT DIARRHEA: Chronic | Status: ACTIVE | Noted: 2019-11-12

## 2019-11-20 LAB
ANION GAP SERPL CALC-SCNC: 12 MMOL/L — SIGNIFICANT CHANGE UP (ref 7–14)
BASOPHILS # BLD AUTO: 0 K/UL — SIGNIFICANT CHANGE UP (ref 0–0.2)
BASOPHILS NFR BLD AUTO: 0 % — SIGNIFICANT CHANGE UP (ref 0–1)
BUN SERPL-MCNC: 13 MG/DL — SIGNIFICANT CHANGE UP (ref 10–20)
CALCIUM SERPL-MCNC: 7.9 MG/DL — LOW (ref 8.5–10.1)
CHLORIDE SERPL-SCNC: 104 MMOL/L — SIGNIFICANT CHANGE UP (ref 98–110)
CO2 SERPL-SCNC: 24 MMOL/L — SIGNIFICANT CHANGE UP (ref 17–32)
CREAT SERPL-MCNC: 0.7 MG/DL — SIGNIFICANT CHANGE UP (ref 0.7–1.5)
EOSINOPHIL # BLD AUTO: 0 K/UL — SIGNIFICANT CHANGE UP (ref 0–0.7)
EOSINOPHIL NFR BLD AUTO: 0 % — SIGNIFICANT CHANGE UP (ref 0–8)
GLUCOSE SERPL-MCNC: 91 MG/DL — SIGNIFICANT CHANGE UP (ref 70–99)
HCT VFR BLD CALC: 30.6 % — LOW (ref 37–47)
HGB BLD-MCNC: 9.6 G/DL — LOW (ref 12–16)
IMM GRANULOCYTES NFR BLD AUTO: 0.4 % — HIGH (ref 0.1–0.3)
LYMPHOCYTES # BLD AUTO: 0.93 K/UL — LOW (ref 1.2–3.4)
LYMPHOCYTES # BLD AUTO: 9.4 % — LOW (ref 20.5–51.1)
MAGNESIUM SERPL-MCNC: 2.6 MG/DL — HIGH (ref 1.8–2.4)
MCHC RBC-ENTMCNC: 20.6 PG — LOW (ref 27–31)
MCHC RBC-ENTMCNC: 31.4 G/DL — LOW (ref 32–37)
MCV RBC AUTO: 65.8 FL — LOW (ref 81–99)
MONOCYTES # BLD AUTO: 0.68 K/UL — HIGH (ref 0.1–0.6)
MONOCYTES NFR BLD AUTO: 6.9 % — SIGNIFICANT CHANGE UP (ref 1.7–9.3)
NEUTROPHILS # BLD AUTO: 8.26 K/UL — HIGH (ref 1.4–6.5)
NEUTROPHILS NFR BLD AUTO: 83.3 % — HIGH (ref 42.2–75.2)
NRBC # BLD: 0 /100 WBCS — SIGNIFICANT CHANGE UP (ref 0–0)
PHOSPHATE SERPL-MCNC: 4.1 MG/DL — SIGNIFICANT CHANGE UP (ref 2.1–4.9)
PLATELET # BLD AUTO: 286 K/UL — SIGNIFICANT CHANGE UP (ref 130–400)
POTASSIUM SERPL-MCNC: 3.7 MMOL/L — SIGNIFICANT CHANGE UP (ref 3.5–5)
POTASSIUM SERPL-SCNC: 3.7 MMOL/L — SIGNIFICANT CHANGE UP (ref 3.5–5)
RBC # BLD: 4.65 M/UL — SIGNIFICANT CHANGE UP (ref 4.2–5.4)
RBC # FLD: 17.3 % — HIGH (ref 11.5–14.5)
SODIUM SERPL-SCNC: 140 MMOL/L — SIGNIFICANT CHANGE UP (ref 135–146)
WBC # BLD: 9.91 K/UL — SIGNIFICANT CHANGE UP (ref 4.8–10.8)
WBC # FLD AUTO: 9.91 K/UL — SIGNIFICANT CHANGE UP (ref 4.8–10.8)

## 2019-11-20 RX ORDER — SIMETHICONE 80 MG/1
1 TABLET, CHEWABLE ORAL
Qty: 60 | Refills: 0
Start: 2019-11-20

## 2019-11-20 RX ORDER — IBUPROFEN 200 MG
1 TABLET ORAL
Qty: 60 | Refills: 0
Start: 2019-11-20

## 2019-11-20 RX ORDER — GABAPENTIN 400 MG/1
1 CAPSULE ORAL
Qty: 6 | Refills: 0
Start: 2019-11-20 | End: 2019-11-21

## 2019-11-20 RX ORDER — SENNA PLUS 8.6 MG/1
2 TABLET ORAL
Qty: 60 | Refills: 0
Start: 2019-11-20

## 2019-11-20 RX ORDER — MAGNESIUM SULFATE 500 MG/ML
2 VIAL (ML) INJECTION ONCE
Refills: 0 | Status: COMPLETED | OUTPATIENT
Start: 2019-11-20 | End: 2019-11-20

## 2019-11-20 RX ORDER — ACETAMINOPHEN 500 MG
2 TABLET ORAL
Qty: 60 | Refills: 0
Start: 2019-11-20

## 2019-11-20 RX ADMIN — Medication 250 MICROGRAM(S): at 07:25

## 2019-11-20 RX ADMIN — Medication 650 MILLIGRAM(S): at 06:04

## 2019-11-20 RX ADMIN — Medication 600 MILLIGRAM(S): at 06:08

## 2019-11-20 RX ADMIN — GABAPENTIN 300 MILLIGRAM(S): 400 CAPSULE ORAL at 06:04

## 2019-11-20 RX ADMIN — Medication 50 GRAM(S): at 04:20

## 2019-11-20 RX ADMIN — Medication 600 MILLIGRAM(S): at 07:26

## 2019-11-20 RX ADMIN — SIMETHICONE 80 MILLIGRAM(S): 80 TABLET, CHEWABLE ORAL at 06:07

## 2019-11-20 RX ADMIN — Medication 650 MILLIGRAM(S): at 06:08

## 2019-11-20 RX ADMIN — Medication 600 MILLIGRAM(S): at 12:05

## 2019-11-20 RX ADMIN — Medication 30 MILLIGRAM(S): at 06:06

## 2019-11-20 RX ADMIN — Medication 650 MILLIGRAM(S): at 12:05

## 2019-11-20 NOTE — DISCHARGE NOTE PROVIDER - NSDCFUADDINST_GEN_ALL_CORE_FT
No heavy lifting.   Nothing inside the vagina for 6 weeks: no tampons, douching, sexual intercourse, tub baths or pools.   If you have a fever over 100.4F, severe abdominal pain or heavy vaginal bleeding please call your doctor or go to the emergency room.  Please follow up with your Surgeon in 1 weeks for a removal of your ureteral stent.

## 2019-11-20 NOTE — DISCHARGE NOTE PROVIDER - CARE PROVIDER_API CALL
Braulio Hill)  Obstetrics and Gynecology  [address] [place] [address]  Phone: [phone]  Fax: [phone]  Follow Up Time: 1 week

## 2019-11-20 NOTE — DISCHARGE NOTE PROVIDER - NSDCCPCAREPLAN_GEN_ALL_CORE_FT
PRINCIPAL DISCHARGE DIAGNOSIS  Diagnosis: Postmenopausal bleeding  Assessment and Plan of Treatment:       SECONDARY DISCHARGE DIAGNOSES  Diagnosis: Uterine polyp  Assessment and Plan of Treatment:

## 2019-11-20 NOTE — DISCHARGE NOTE PROVIDER - HOSPITAL COURSE
Patient was admitted after surgery of RATLH BSO Cysto with placement of left ureteral stent and repair of vaginal laceration. Postoperative she did well. Labs and vital signs were stable. On POD 1, she ambulated, voided, tolerated a regular diet and pain was well controlled on PO meds. She was discharged home on POD 1.

## 2019-11-20 NOTE — DISCHARGE NOTE PROVIDER - NSDCCPTREATMENT_GEN_ALL_CORE_FT
PRINCIPAL PROCEDURE  Procedure: Hysterectomy, total, robot-assisted, laparoscopic, with cystoscopy  Findings and Treatment:       SECONDARY PROCEDURE  Procedure: Repair, laceration, perineal  Findings and Treatment:     Procedure: Cystoscopy, with ureteral stent replacement  Findings and Treatment: left side    Procedure: Bilateral salpingoophorectomy  Findings and Treatment:

## 2019-11-20 NOTE — PROGRESS NOTE ADULT - SUBJECTIVE AND OBJECTIVE BOX
CORY HEMAL  59y  Female    PGY3 Note:    Pt recovering well, no acute complaints. Denies severe abdominal pain, heavy vaginal bleeding/foul smelling vaginal discharge. Denies fever, chills, n/v/d, dysuria, urgency, frequency.     Ambulating: No  Voiding: Kiser d/stacey; UO: 1874-1616: 400cc/6h->66.6cc/hr (min 56cc/hr)   Flatus: No  Bowel movements: No   Diet: Regular    MEDICATIONS  (STANDING):  acetaminophen   Tablet .. 650 milliGRAM(s) Oral every 6 hours  ALPRAZolam 0.5 milliGRAM(s) Oral daily  famotidine    Tablet 20 milliGRAM(s) Oral daily  gabapentin 300 milliGRAM(s) Oral every 8 hours  hydrochlorothiazide 25 milliGRAM(s) Oral daily  ibuprofen  Tablet. 600 milliGRAM(s) Oral every 6 hours  lactated ringers. 1000 milliLiter(s) (75 mL/Hr) IV Continuous <Continuous>  levothyroxine 250 MICROGram(s) Oral daily  metoprolol succinate ER 25 milliGRAM(s) Oral daily  NIFEdipine XL 30 milliGRAM(s) Oral daily  potassium chloride    Tablet ER 10 milliEquivalent(s) Oral daily  simethicone 80 milliGRAM(s) Chew every 8 hours    MEDICATIONS  (PRN):  ondansetron Injectable 4 milliGRAM(s) IV Push every 4 hours PRN Nausea and/or Vomiting  senna 2 Tablet(s) Oral at bedtime PRN Constipation      PAST MEDICAL & SURGICAL HISTORY:  H/O melanoma excision  Lung nodule  Migraines  Irritable bowel disease  Fibromyalgia  GERD (gastroesophageal reflux disease)  Hypothyroidism  HTN (hypertension)  History of left knee replacement  History of D&C  S/P laparoscopic cholecystectomy  S/P thyroidectomy      Physical Exam  Vital Signs Last 24 Hrs  T(C): 37.4 (20 Nov 2019 00:00), Max: 37.4 (20 Nov 2019 00:00)  T(F): 99.3 (20 Nov 2019 00:00), Max: 99.3 (20 Nov 2019 00:00)  HR: 77 (20 Nov 2019 00:00) (72 - 108)  BP: 117/56 (20 Nov 2019 00:00) (107/55 - 168/89)  RR: 14 (20 Nov 2019 00:00) (14 - 27)  SpO2: 96% (19 Nov 2019 17:17) (96% - 99%)    Gen: AAOx3, NAD  Heart: RRR, S1S2+  Lungs: CTA B/L, no r/r/w   Wound: dressings in place c/d/i   Abd: Soft, nontender, nondistended, BS+  Perineum: minimal serosanguinous staining of trevon-pad; no active bleeding  Ext: No calf tenderness, no swelling; SCDs in place     Labs:                        10.8   14.59 )-----------( 300      ( 19 Nov 2019 21:12 )             34.3     11-19    142  |  104  |  15  ----------------------------<  118<H>  4.1   |  25  |  0.7    Ca    8.1<L>      19 Nov 2019 21:12  Phos  3.8     11-19  Mg     1.6     11-19                            12.1   8.43  )-----------( 314      ( 12 Nov 2019 06:47 )             39.6

## 2019-11-20 NOTE — PROGRESS NOTE ADULT - ASSESSMENT
A/P: 58yo P0, PMHx significant for HTN, anxiety, hypothyroidism s/p total thyroidectomy, morbid obesity, S/P  RATLH/BSO, cystoscopy with left ureteral stent placement by urology intra-op, and repair of vaginal laceration, EBL 100cc, POD1, recovering well   - encourage ambulation, PO hydration  - TOV 1230   - f/u AM labs  - monitor vitals, bleeding   - antihypertensives held this AM in view of BP, procardia xl administered  - regular idet  - encourage incentive spirometry   - pain mgmt   - simethicone/senna  - routine postop care   - anticipate d/c home     Will inform Dr. Hill

## 2019-11-20 NOTE — DISCHARGE NOTE NURSING/CASE MANAGEMENT/SOCIAL WORK - PATIENT PORTAL LINK FT
You can access the FollowMyHealth Patient Portal offered by [facility] by registering at the following website: [email or web address]. By joining [employer]’s FollowMyHealth portal, you will also be able to view your health information using other applications (apps) compatible with our system.

## 2019-11-20 NOTE — DISCHARGE NOTE PROVIDER - CARE PROVIDERS DIRECT ADDRESSES
[email or web address]

## 2019-11-20 NOTE — DISCHARGE NOTE PROVIDER - NSDCFUSCHEDAPPT_GEN_ALL_CORE_FT
CORY RECIO ; 11/21/2019 ; NPP Otolaryng [address]  CORY RECIO ; 12/18/2019 ; NPP OrthoSurg [address]

## 2019-11-20 NOTE — DISCHARGE NOTE PROVIDER - NSDCMRMEDTOKEN_GEN_ALL_CORE_FT
acetaminophen 325 mg oral tablet: 2 tab(s) orally every 6 hours, As Needed for mild pain  gabapentin 300 mg oral capsule: 1 cap(s) orally every 8 hours for postoperative pain  ibuprofen 600 mg oral tablet: 1 tab(s) orally every 6 hours, As Needed for moderate pain  senna oral tablet: 2 tab(s) orally once a day (at bedtime), As needed, Constipation  simethicone 80 mg oral tablet, chewable: 1 tab(s) orally every 8 hours, As Needed gas pain

## 2019-11-20 NOTE — PROGRESS NOTE ADULT - SUBJECTIVE AND OBJECTIVE BOX
PGY 4 Note    Patient examined at bedside. Pain 2-3/10 in the lower abdomen. Denies fevers/chills, HA/N/V, CP/SOB/palpitations, vaginal bleeding, hematuria/dysuria, constipation/diarrhea. Tolerating clear diet, passing flatus. Not Ambulating. Using incentive spirometry.     T(F): 99.3 (11-20-19 @ 00:00), Max: 99.3 (11-20-19 @ 00:00)  HR: 77 (11-20-19 @ 00:00) (72 - 108)  BP: 117/56 (11-20-19 @ 00:00) (107/55 - 168/89)  RR: 14 (11-20-19 @ 00:00) (14 - 27)  SpO2: 96% (11-19-19 @ 17:17) (96% - 99%)    I&O's Summary  19 Nov 2019 07:01  -  20 Nov 2019 03:46  --------------------------------------------------------  IN: 580 mL / OUT: 1350 mL / NET: -770 mL    Urine:   11-19-19 @ 07:01  -  11-20-19 @ 03:46  --------------------------------------------------------  IN: 0 mL / OUT: 1350 mL / NET: -1350 mL    CAPILLARY BLOOD GLUCOSE  POCT Blood Glucose.: 86 mg/dL (19 Nov 2019 06:39)    Physical Exam:  General: AAOx3. NAD  CVS: RRR. Nl S1S2  Lungs: CTAB  Abdomen: soft, non-tender, non-distended, +BSx4  Incision: Laparoscopic incision bandages in place C/D/I  VE: deferred, no bleeding on pad  Ext: No edema. SCDs in place    Labs:             10.8<L>  14.59<H> )-----------( 300      ( 11-19 @ 21:12 )             34.3<L>     11-19    142  |  104  |  15  ----------------------------<  118<H>  4.1   |  25  |  0.7    Ca    8.1<L>      19 Nov 2019 21:12  Phos  3.8     11-19  Mg     1.6     11-19    Medications:  MEDICATIONS  (STANDING):  acetaminophen   Tablet .. 650 milliGRAM(s) Oral every 6 hours  ALPRAZolam 0.5 milliGRAM(s) Oral daily  famotidine    Tablet 20 milliGRAM(s) Oral daily  gabapentin 300 milliGRAM(s) Oral every 8 hours  hydrochlorothiazide 25 milliGRAM(s) Oral daily  ibuprofen  Tablet. 600 milliGRAM(s) Oral every 6 hours  lactated ringers. 1000 milliLiter(s) (75 mL/Hr) IV Continuous <Continuous>  levothyroxine 250 MICROGram(s) Oral daily  metoprolol succinate ER 25 milliGRAM(s) Oral daily  NIFEdipine XL 30 milliGRAM(s) Oral daily  potassium chloride    Tablet ER 10 milliEquivalent(s) Oral daily  simethicone 80 milliGRAM(s) Chew every 8 hours    MEDICATIONS  (PRN):  ondansetron Injectable 4 milliGRAM(s) IV Push every 4 hours PRN Nausea and/or Vomiting  senna 2 Tablet(s) Oral at bedtime PRN Constipation    A/P:  60yo P0 PMH HTN, anxiety, morbid obesity, hypothyroidism with PMB and h/o uterine polyp s/p RATLH BSO, Cystoscopy with placement of left ureteral stent by Urology, repair of vag laceration, EBL 100cc  POD 1  Neuro: AAOx3, NAD, ERAS for pain  Cardio: f/u VS, home meds ordered  Pulm: Incentive spirometry at bedside  ID: f/u AM labs, no need for postop abx  Heme: f/u AM labs  GI: regular diet  DVT prophylaxis: SCDs in place, Lovenox in AM    Will inform Dr. Hill

## 2019-11-21 ENCOUNTER — APPOINTMENT (OUTPATIENT)
Dept: OTOLARYNGOLOGY | Facility: CLINIC | Age: 59
End: 2019-11-21

## 2019-11-22 ENCOUNTER — INPATIENT (INPATIENT)
Facility: HOSPITAL | Age: 59
LOS: 0 days | Discharge: HOME | End: 2019-11-23
Attending: HOSPITALIST | Admitting: HOSPITALIST
Payer: COMMERCIAL

## 2019-11-22 VITALS
HEART RATE: 82 BPM | OXYGEN SATURATION: 99 % | RESPIRATION RATE: 18 BRPM | DIASTOLIC BLOOD PRESSURE: 86 MMHG | WEIGHT: 250 LBS | SYSTOLIC BLOOD PRESSURE: 182 MMHG | TEMPERATURE: 97 F

## 2019-11-22 DIAGNOSIS — I10 ESSENTIAL (PRIMARY) HYPERTENSION: ICD-10-CM

## 2019-11-22 DIAGNOSIS — Z91.048 OTHER NONMEDICINAL SUBSTANCE ALLERGY STATUS: ICD-10-CM

## 2019-11-22 DIAGNOSIS — Z79.82 LONG TERM (CURRENT) USE OF ASPIRIN: ICD-10-CM

## 2019-11-22 DIAGNOSIS — N84.0 POLYP OF CORPUS UTERI: ICD-10-CM

## 2019-11-22 DIAGNOSIS — Z90.49 ACQUIRED ABSENCE OF OTHER SPECIFIED PARTS OF DIGESTIVE TRACT: Chronic | ICD-10-CM

## 2019-11-22 DIAGNOSIS — E03.9 HYPOTHYROIDISM, UNSPECIFIED: ICD-10-CM

## 2019-11-22 DIAGNOSIS — Z96.652 PRESENCE OF LEFT ARTIFICIAL KNEE JOINT: Chronic | ICD-10-CM

## 2019-11-22 DIAGNOSIS — Z96.652 PRESENCE OF LEFT ARTIFICIAL KNEE JOINT: ICD-10-CM

## 2019-11-22 DIAGNOSIS — D25.9 LEIOMYOMA OF UTERUS, UNSPECIFIED: ICD-10-CM

## 2019-11-22 DIAGNOSIS — F41.9 ANXIETY DISORDER, UNSPECIFIED: ICD-10-CM

## 2019-11-22 DIAGNOSIS — N95.0 POSTMENOPAUSAL BLEEDING: ICD-10-CM

## 2019-11-22 DIAGNOSIS — K21.9 GASTRO-ESOPHAGEAL REFLUX DISEASE WITHOUT ESOPHAGITIS: ICD-10-CM

## 2019-11-22 DIAGNOSIS — G43.909 MIGRAINE, UNSPECIFIED, NOT INTRACTABLE, WITHOUT STATUS MIGRAINOSUS: ICD-10-CM

## 2019-11-22 DIAGNOSIS — E66.01 MORBID (SEVERE) OBESITY DUE TO EXCESS CALORIES: ICD-10-CM

## 2019-11-22 DIAGNOSIS — Z98.890 OTHER SPECIFIED POSTPROCEDURAL STATES: Chronic | ICD-10-CM

## 2019-11-22 DIAGNOSIS — N13.5 CROSSING VESSEL AND STRICTURE OF URETER WITHOUT HYDRONEPHROSIS: ICD-10-CM

## 2019-11-22 DIAGNOSIS — R91.1 SOLITARY PULMONARY NODULE: ICD-10-CM

## 2019-11-22 DIAGNOSIS — K58.9 IRRITABLE BOWEL SYNDROME WITHOUT DIARRHEA: ICD-10-CM

## 2019-11-22 DIAGNOSIS — M79.7 FIBROMYALGIA: ICD-10-CM

## 2019-11-22 DIAGNOSIS — Z91.040 LATEX ALLERGY STATUS: ICD-10-CM

## 2019-11-22 PROCEDURE — 99291 CRITICAL CARE FIRST HOUR: CPT

## 2019-11-22 RX ORDER — ONDANSETRON 8 MG/1
4 TABLET, FILM COATED ORAL ONCE
Refills: 0 | Status: COMPLETED | OUTPATIENT
Start: 2019-11-22 | End: 2019-11-22

## 2019-11-22 RX ORDER — ACETAMINOPHEN 500 MG
650 TABLET ORAL ONCE
Refills: 0 | Status: COMPLETED | OUTPATIENT
Start: 2019-11-22 | End: 2019-11-22

## 2019-11-22 RX ORDER — SODIUM CHLORIDE 9 MG/ML
1000 INJECTION INTRAMUSCULAR; INTRAVENOUS; SUBCUTANEOUS ONCE
Refills: 0 | Status: COMPLETED | OUTPATIENT
Start: 2019-11-22 | End: 2019-11-22

## 2019-11-22 NOTE — ED PROVIDER NOTE - CLINICAL SUMMARY MEDICAL DECISION MAKING FREE TEXT BOX
extensive free air noted SUbQ abd, colitis noted, will give IV abx and consult gyn/sx extensive free air noted SUbQ abd, colitis noted, will give IV abx and consult gyn/sx  eval by URO, GYN and sx all feel there is no acute sx intervention needed.  pt does have a mild colitis, diarrhea and 4mm kidney stone with severe flank pain, will admit to medical service with IV abx and flomax

## 2019-11-22 NOTE — ED ADULT TRIAGE NOTE - CHIEF COMPLAINT QUOTE
pt " I had a total laproscopic  hysterectomy done on tuesday and now complaining of abdominal pain and nausea and  they placed a stent in and I am afraid it might be dislodged"

## 2019-11-22 NOTE — ED PROVIDER NOTE - NS ED ROS FT
ROS  Constitutional:  See HPI.  Eyes:  No visual changes, eye pain or discharge.  ENMT:  No hearing changes, pain, discharge or infections. No neck pain or stiffness.  Cardiac:  No chest pain, SOB or edema. No chest pain with exertion.  Respiratory:  No cough or respiratory distress. No hemoptysis.  GI:  + nause, +abdominal pain.  :  No dysuria, frequency or burning.  MS:  No myalgia, muscle weakness, joint pain or back pain.  Neuro:  No focal neurological complaints.  Skin:  No skin rash.  Except as documented in the HPI,  all other systems are negative.

## 2019-11-22 NOTE — ED PROVIDER NOTE - OBJECTIVE STATEMENT
59 F to ED with abd pain s/p TAHBSO at [facility] by Dr. Hill + Dr. Bryson  POD# 3 and this evening pain started in L flank radiating to L abdomen, persistent assoc with nausea.   No fevers, no trauma and well appearing non toxic.

## 2019-11-22 NOTE — ED PROVIDER NOTE - CRITICAL CARE PROVIDED
consultation with other physicians/additional history taking/documentation/direct patient care (not related to procedure)/interpretation of diagnostic studies

## 2019-11-22 NOTE — ED PROVIDER NOTE - PHYSICAL EXAMINATION
EXAM:  CONSTITUTIONAL: WA / WN / NAD  HEAD: NCAT  EYES: PERRL; EOMI; anicteric.  ENT: Normal pharynx; mucous membranes pink/moist, no erythema.  NECK: Supple; no meningeal signs  CARD: RRR; nl S1/S2; no M/R/G. Pulses equal bilaterally.  RESP: Respiratory rate and effort are normal; breath sounds clear and equal bilaterally.  ABD: Soft, but tender + L flank tenderness  MSK/EXT: No gross deformities; full range of motion.  SKIN: Warm and dry;   NEURO: AAOx3, Motor 5/5 x 4 extremities, Sensations intact to pain and palpation, Cerebellar testing normal  PSYCH: Memory Intact, Normal Affect

## 2019-11-23 ENCOUNTER — TRANSCRIPTION ENCOUNTER (OUTPATIENT)
Age: 59
End: 2019-11-23

## 2019-11-23 VITALS
SYSTOLIC BLOOD PRESSURE: 133 MMHG | DIASTOLIC BLOOD PRESSURE: 78 MMHG | RESPIRATION RATE: 20 BRPM | TEMPERATURE: 98 F | HEART RATE: 75 BPM

## 2019-11-23 DIAGNOSIS — Z90.710 ACQUIRED ABSENCE OF BOTH CERVIX AND UTERUS: Chronic | ICD-10-CM

## 2019-11-23 LAB
ALBUMIN SERPL ELPH-MCNC: 4 G/DL — SIGNIFICANT CHANGE UP (ref 3.5–5.2)
ALP SERPL-CCNC: 64 U/L — SIGNIFICANT CHANGE UP (ref 30–115)
ALT FLD-CCNC: 24 U/L — SIGNIFICANT CHANGE UP (ref 0–41)
ANION GAP SERPL CALC-SCNC: 15 MMOL/L — HIGH (ref 7–14)
APPEARANCE UR: ABNORMAL
AST SERPL-CCNC: 30 U/L — SIGNIFICANT CHANGE UP (ref 0–41)
BACTERIA # UR AUTO: NEGATIVE — SIGNIFICANT CHANGE UP
BASOPHILS # BLD AUTO: 0.02 K/UL — SIGNIFICANT CHANGE UP (ref 0–0.2)
BASOPHILS NFR BLD AUTO: 0.2 % — SIGNIFICANT CHANGE UP (ref 0–1)
BILIRUB SERPL-MCNC: 0.5 MG/DL — SIGNIFICANT CHANGE UP (ref 0.2–1.2)
BILIRUB UR-MCNC: NEGATIVE — SIGNIFICANT CHANGE UP
BUN SERPL-MCNC: 17 MG/DL — SIGNIFICANT CHANGE UP (ref 10–20)
CALCIUM SERPL-MCNC: 8.4 MG/DL — LOW (ref 8.5–10.1)
CHLORIDE SERPL-SCNC: 101 MMOL/L — SIGNIFICANT CHANGE UP (ref 98–110)
CO2 SERPL-SCNC: 23 MMOL/L — SIGNIFICANT CHANGE UP (ref 17–32)
COLOR SPEC: COLORLESS — SIGNIFICANT CHANGE UP
CREAT SERPL-MCNC: 0.9 MG/DL — SIGNIFICANT CHANGE UP (ref 0.7–1.5)
DIFF PNL FLD: ABNORMAL
EOSINOPHIL # BLD AUTO: 0.17 K/UL — SIGNIFICANT CHANGE UP (ref 0–0.7)
EOSINOPHIL NFR BLD AUTO: 1.9 % — SIGNIFICANT CHANGE UP (ref 0–8)
EPI CELLS # UR: 0 /HPF — SIGNIFICANT CHANGE UP (ref 0–5)
GLUCOSE SERPL-MCNC: 130 MG/DL — HIGH (ref 70–99)
GLUCOSE UR QL: NEGATIVE — SIGNIFICANT CHANGE UP
HCT VFR BLD CALC: 37.5 % — SIGNIFICANT CHANGE UP (ref 37–47)
HGB BLD-MCNC: 11.9 G/DL — LOW (ref 12–16)
HYALINE CASTS # UR AUTO: 4 /LPF — SIGNIFICANT CHANGE UP (ref 0–7)
IMM GRANULOCYTES NFR BLD AUTO: 0.5 % — HIGH (ref 0.1–0.3)
KETONES UR-MCNC: NEGATIVE — SIGNIFICANT CHANGE UP
LACTATE SERPL-SCNC: 1.9 MMOL/L — SIGNIFICANT CHANGE UP (ref 0.5–2.2)
LEUKOCYTE ESTERASE UR-ACNC: ABNORMAL
LIDOCAIN IGE QN: 32 U/L — SIGNIFICANT CHANGE UP (ref 7–60)
LYMPHOCYTES # BLD AUTO: 1.43 K/UL — SIGNIFICANT CHANGE UP (ref 1.2–3.4)
LYMPHOCYTES # BLD AUTO: 16.2 % — LOW (ref 20.5–51.1)
MAGNESIUM SERPL-MCNC: 1.9 MG/DL — SIGNIFICANT CHANGE UP (ref 1.8–2.4)
MCHC RBC-ENTMCNC: 20.8 PG — LOW (ref 27–31)
MCHC RBC-ENTMCNC: 31.7 G/DL — LOW (ref 32–37)
MCV RBC AUTO: 65.7 FL — LOW (ref 81–99)
MONOCYTES # BLD AUTO: 0.45 K/UL — SIGNIFICANT CHANGE UP (ref 0.1–0.6)
MONOCYTES NFR BLD AUTO: 5.1 % — SIGNIFICANT CHANGE UP (ref 1.7–9.3)
NEUTROPHILS # BLD AUTO: 6.72 K/UL — HIGH (ref 1.4–6.5)
NEUTROPHILS NFR BLD AUTO: 76.1 % — HIGH (ref 42.2–75.2)
NITRITE UR-MCNC: NEGATIVE — SIGNIFICANT CHANGE UP
NRBC # BLD: 0 /100 WBCS — SIGNIFICANT CHANGE UP (ref 0–0)
PH UR: 6.5 — SIGNIFICANT CHANGE UP (ref 5–8)
PLATELET # BLD AUTO: 317 K/UL — SIGNIFICANT CHANGE UP (ref 130–400)
POTASSIUM SERPL-MCNC: 5 MMOL/L — SIGNIFICANT CHANGE UP (ref 3.5–5)
POTASSIUM SERPL-SCNC: 5 MMOL/L — SIGNIFICANT CHANGE UP (ref 3.5–5)
PROT SERPL-MCNC: 6.7 G/DL — SIGNIFICANT CHANGE UP (ref 6–8)
PROT UR-MCNC: ABNORMAL
RBC # BLD: 5.71 M/UL — HIGH (ref 4.2–5.4)
RBC # FLD: 18.1 % — HIGH (ref 11.5–14.5)
RBC CASTS # UR COMP ASSIST: 161 /HPF — HIGH (ref 0–4)
SODIUM SERPL-SCNC: 139 MMOL/L — SIGNIFICANT CHANGE UP (ref 135–146)
SP GR SPEC: 1.01 — LOW (ref 1.01–1.02)
UROBILINOGEN FLD QL: SIGNIFICANT CHANGE UP
WBC # BLD: 8.83 K/UL — SIGNIFICANT CHANGE UP (ref 4.8–10.8)
WBC # FLD AUTO: 8.83 K/UL — SIGNIFICANT CHANGE UP (ref 4.8–10.8)
WBC UR QL: 55 /HPF — HIGH (ref 0–5)

## 2019-11-23 PROCEDURE — 99221 1ST HOSP IP/OBS SF/LOW 40: CPT

## 2019-11-23 PROCEDURE — 74018 RADEX ABDOMEN 1 VIEW: CPT | Mod: 26

## 2019-11-23 PROCEDURE — 74176 CT ABD & PELVIS W/O CONTRAST: CPT | Mod: 26

## 2019-11-23 PROCEDURE — 99222 1ST HOSP IP/OBS MODERATE 55: CPT | Mod: AI

## 2019-11-23 PROCEDURE — 71045 X-RAY EXAM CHEST 1 VIEW: CPT | Mod: 26

## 2019-11-23 RX ORDER — HYDROCHLOROTHIAZIDE 25 MG
25 TABLET ORAL DAILY
Refills: 0 | Status: DISCONTINUED | OUTPATIENT
Start: 2019-11-23 | End: 2019-11-23

## 2019-11-23 RX ORDER — METOPROLOL TARTRATE 50 MG
1 TABLET ORAL
Qty: 0 | Refills: 0 | DISCHARGE

## 2019-11-23 RX ORDER — ASPIRIN/CALCIUM CARB/MAGNESIUM 324 MG
1 TABLET ORAL
Qty: 0 | Refills: 0 | DISCHARGE

## 2019-11-23 RX ORDER — NIFEDIPINE 30 MG
30 TABLET, EXTENDED RELEASE 24 HR ORAL DAILY
Refills: 0 | Status: DISCONTINUED | OUTPATIENT
Start: 2019-11-23 | End: 2019-11-23

## 2019-11-23 RX ORDER — METRONIDAZOLE 500 MG
500 TABLET ORAL ONCE
Refills: 0 | Status: COMPLETED | OUTPATIENT
Start: 2019-11-23 | End: 2019-11-23

## 2019-11-23 RX ORDER — DEXLANSOPRAZOLE 30 MG/1
1 CAPSULE, DELAYED RELEASE ORAL
Qty: 0 | Refills: 0 | DISCHARGE

## 2019-11-23 RX ORDER — LEVOTHYROXINE SODIUM 125 MCG
250 TABLET ORAL DAILY
Refills: 0 | Status: DISCONTINUED | OUTPATIENT
Start: 2019-11-23 | End: 2019-11-23

## 2019-11-23 RX ORDER — SODIUM CHLORIDE 9 MG/ML
1000 INJECTION INTRAMUSCULAR; INTRAVENOUS; SUBCUTANEOUS
Refills: 0 | Status: DISCONTINUED | OUTPATIENT
Start: 2019-11-23 | End: 2019-11-23

## 2019-11-23 RX ORDER — PIPERACILLIN AND TAZOBACTAM 4; .5 G/20ML; G/20ML
3.38 INJECTION, POWDER, LYOPHILIZED, FOR SOLUTION INTRAVENOUS ONCE
Refills: 0 | Status: COMPLETED | OUTPATIENT
Start: 2019-11-23 | End: 2019-11-23

## 2019-11-23 RX ORDER — POTASSIUM CHLORIDE 20 MEQ
1 PACKET (EA) ORAL
Qty: 0 | Refills: 0 | DISCHARGE

## 2019-11-23 RX ORDER — IBUPROFEN 200 MG
600 TABLET ORAL EVERY 6 HOURS
Refills: 0 | Status: DISCONTINUED | OUTPATIENT
Start: 2019-11-23 | End: 2019-11-23

## 2019-11-23 RX ORDER — ALPRAZOLAM 0.25 MG
0.5 TABLET ORAL
Qty: 0 | Refills: 0 | DISCHARGE

## 2019-11-23 RX ORDER — ENOXAPARIN SODIUM 100 MG/ML
40 INJECTION SUBCUTANEOUS DAILY
Refills: 0 | Status: DISCONTINUED | OUTPATIENT
Start: 2019-11-23 | End: 2019-11-23

## 2019-11-23 RX ORDER — LEVOTHYROXINE SODIUM 125 MCG
2 TABLET ORAL
Qty: 0 | Refills: 0 | DISCHARGE

## 2019-11-23 RX ORDER — CHOLECALCIFEROL (VITAMIN D3) 125 MCG
1 CAPSULE ORAL
Qty: 0 | Refills: 0 | DISCHARGE

## 2019-11-23 RX ORDER — PANTOPRAZOLE SODIUM 20 MG/1
40 TABLET, DELAYED RELEASE ORAL
Refills: 0 | Status: DISCONTINUED | OUTPATIENT
Start: 2019-11-23 | End: 2019-11-23

## 2019-11-23 RX ORDER — ACETAMINOPHEN 500 MG
650 TABLET ORAL EVERY 6 HOURS
Refills: 0 | Status: DISCONTINUED | OUTPATIENT
Start: 2019-11-23 | End: 2019-11-23

## 2019-11-23 RX ORDER — CHOLECALCIFEROL (VITAMIN D3) 125 MCG
2000 CAPSULE ORAL DAILY
Refills: 0 | Status: DISCONTINUED | OUTPATIENT
Start: 2019-11-23 | End: 2019-11-23

## 2019-11-23 RX ORDER — POTASSIUM CHLORIDE 20 MEQ
10 PACKET (EA) ORAL DAILY
Refills: 0 | Status: DISCONTINUED | OUTPATIENT
Start: 2019-11-23 | End: 2019-11-23

## 2019-11-23 RX ORDER — NIFEDIPINE 30 MG
1 TABLET, EXTENDED RELEASE 24 HR ORAL
Qty: 0 | Refills: 0 | DISCHARGE

## 2019-11-23 RX ORDER — KETOROLAC TROMETHAMINE 30 MG/ML
15 SYRINGE (ML) INJECTION ONCE
Refills: 0 | Status: DISCONTINUED | OUTPATIENT
Start: 2019-11-23 | End: 2019-11-23

## 2019-11-23 RX ORDER — FOLIC ACID 0.8 MG
1 TABLET ORAL
Qty: 0 | Refills: 0 | DISCHARGE

## 2019-11-23 RX ORDER — ASPIRIN/CALCIUM CARB/MAGNESIUM 324 MG
81 TABLET ORAL DAILY
Refills: 0 | Status: DISCONTINUED | OUTPATIENT
Start: 2019-11-23 | End: 2019-11-23

## 2019-11-23 RX ORDER — FOLIC ACID 0.8 MG
1 TABLET ORAL DAILY
Refills: 0 | Status: DISCONTINUED | OUTPATIENT
Start: 2019-11-23 | End: 2019-11-23

## 2019-11-23 RX ORDER — METOPROLOL TARTRATE 50 MG
25 TABLET ORAL DAILY
Refills: 0 | Status: DISCONTINUED | OUTPATIENT
Start: 2019-11-23 | End: 2019-11-23

## 2019-11-23 RX ADMIN — Medication 650 MILLIGRAM(S): at 00:04

## 2019-11-23 RX ADMIN — Medication 500 MILLIGRAM(S): at 02:53

## 2019-11-23 RX ADMIN — Medication 250 MICROGRAM(S): at 06:14

## 2019-11-23 RX ADMIN — ENOXAPARIN SODIUM 40 MILLIGRAM(S): 100 INJECTION SUBCUTANEOUS at 11:56

## 2019-11-23 RX ADMIN — Medication 81 MILLIGRAM(S): at 11:56

## 2019-11-23 RX ADMIN — Medication 650 MILLIGRAM(S): at 00:35

## 2019-11-23 RX ADMIN — SODIUM CHLORIDE 150 MILLILITER(S): 9 INJECTION INTRAMUSCULAR; INTRAVENOUS; SUBCUTANEOUS at 04:43

## 2019-11-23 RX ADMIN — Medication 25 MILLIGRAM(S): at 06:15

## 2019-11-23 RX ADMIN — ONDANSETRON 4 MILLIGRAM(S): 8 TABLET, FILM COATED ORAL at 00:04

## 2019-11-23 RX ADMIN — SODIUM CHLORIDE 2000 MILLILITER(S): 9 INJECTION INTRAMUSCULAR; INTRAVENOUS; SUBCUTANEOUS at 00:02

## 2019-11-23 RX ADMIN — Medication 30 MILLIGRAM(S): at 06:14

## 2019-11-23 RX ADMIN — Medication 1 MILLIGRAM(S): at 11:56

## 2019-11-23 RX ADMIN — Medication 100 MILLIGRAM(S): at 01:46

## 2019-11-23 RX ADMIN — Medication 25 MILLIGRAM(S): at 06:14

## 2019-11-23 RX ADMIN — PANTOPRAZOLE SODIUM 40 MILLIGRAM(S): 20 TABLET, DELAYED RELEASE ORAL at 06:14

## 2019-11-23 RX ADMIN — SODIUM CHLORIDE 1000 MILLILITER(S): 9 INJECTION INTRAMUSCULAR; INTRAVENOUS; SUBCUTANEOUS at 01:31

## 2019-11-23 RX ADMIN — Medication 2000 UNIT(S): at 11:56

## 2019-11-23 RX ADMIN — PIPERACILLIN AND TAZOBACTAM 200 GRAM(S): 4; .5 INJECTION, POWDER, LYOPHILIZED, FOR SOLUTION INTRAVENOUS at 02:53

## 2019-11-23 RX ADMIN — Medication 15 MILLIGRAM(S): at 01:46

## 2019-11-23 NOTE — H&P ADULT - NSHPLABSRESULTS_GEN_ALL_CORE
LABS:                        11.9   8.83  )-----------( 317      ( 2019 23:23 )             37.5         139  |  101  |  17  ----------------------------<  130<H>  5.0   |  23  |  0.9    Ca    8.4<L>      2019 23:23  Mg     1.9         TPro  6.7  /  Alb  4.0  /  TBili  0.5  /  DBili  x   /  AST  30  /  ALT  24  /  AlkPhos  64        Urinalysis Basic - ( 2019 23:23 )    Color: Colorless / Appearance: Slightly Turbid / S.009 / pH: x  Gluc: x / Ketone: Negative  / Bili: Negative / Urobili: <2 mg/dL   Blood: x / Protein: 30 mg/dL / Nitrite: Negative   Leuk Esterase: Large / RBC: 161 /HPF / WBC 55 /HPF   Sq Epi: x / Non Sq Epi: 0 /HPF / Bacteria: Negative        Lactate, Blood: 1.9 mmol/L (19 @ 23:23)    < from: CT Abdomen and Pelvis No Cont (19 @ 00:41) >    Diffuse subcutaneous emphysema of the anterior abdominal wall, extending   posteriorly at the level of the epigastrium, and extending inferiorly   along the anterior thighs andvaginal labia. Findings likely representing   subcutaneous insufflation from robotic SIDNEY/BSO from 4 days prior.    Moderate intravesicular air; correlate for recent history of urinary   catheterization. Otherwise correlate with urinalysis.    Mild scattered fluid in the left pelvis abutting the sigmoid colon   without discrete collection. Findings may represent postoperative fluid,   however inflammatory process is not excluded.    Probable 4 mm mid to distal left ureteral calculus adjacent to  the ureteral stent. Please correlate clinically.    < end of copied text >

## 2019-11-23 NOTE — H&P ADULT - NSICDXPASTSURGICALHX_GEN_ALL_CORE_FT
PAST SURGICAL HISTORY:  History of D&C     History of left knee replacement     S/P laparoscopic cholecystectomy     S/P thyroidectomy     S/P total hysterectomy and bilateral salpingo-oophorectomy

## 2019-11-23 NOTE — DISCHARGE NOTE PROVIDER - NSDCMRMEDTOKEN_GEN_ALL_CORE_FT
acetaminophen 325 mg oral tablet: 2 tab(s) orally every 6 hours, As Needed for mild pain  Aspir 81 oral delayed release tablet: 1 tab(s) orally once a day  folic acid 1 mg oral tablet: 1 tab(s) orally once a day  gabapentin 300 mg oral capsule: 1 cap(s) orally every 8 hours for postoperative pain  hydroCHLOROthiazide 25 mg oral tablet: 1 tab(s) orally once a day  ibuprofen 600 mg oral tablet: 1 tab(s) orally every 6 hours, As Needed for moderate pain  Klor-Con 10 mEq oral tablet, extended release: 1 tab(s) orally once a day  metoprolol succinate 25 mg oral tablet, extended release: 1 tab(s) orally once a day  Procardia XL 30 mg oral tablet, extended release: 1 tab(s) orally once a day  senna oral tablet: 2 tab(s) orally once a day (at bedtime), As needed, Constipation  simethicone 80 mg oral tablet, chewable: 1 tab(s) orally every 8 hours, As Needed gas pain  Synthroid 125 mcg (0.125 mg) oral tablet: 2 tab(s) orally once a day  Vitamin D3 2000 intl units oral tablet: 1 tab(s) orally once a day

## 2019-11-23 NOTE — DISCHARGE NOTE PROVIDER - NSDCCPCAREPLAN_GEN_ALL_CORE_FT
PRINCIPAL DISCHARGE DIAGNOSIS  Diagnosis: Ureteral colic  Assessment and Plan of Treatment: - which likely passed with IV fluids  - follow up with gynecology and urology as outpatient  - return to ER if symptoms persist or worsen. PRINCIPAL DISCHARGE DIAGNOSIS  Diagnosis: Ureteral colic  Assessment and Plan of Treatment: - which likely passed with IV fluids  - maintain adequant oral hydration (with plenty of water)  - follow up with gynecology and urology as outpatient  - return to ER if symptoms persist or worsen. PRINCIPAL DISCHARGE DIAGNOSIS  Diagnosis: Ureteral colic  Assessment and Plan of Treatment: - which likely passed with IV fluids  - maintain adequant oral hydration (with water)  - follow up with gynecology and urology as outpatient  - return to ER if symptoms persist or worsen.

## 2019-11-23 NOTE — CONSULT NOTE ADULT - ASSESSMENT
60yo P0, PMHx significant for HTN, IBD, anxiety, hypothyroidism s/p total thyroidectomy, morbid obesity, S/P  RATLH/BSO, cystoscopy with left ureteral stent placement by urology intra-op, and repair of vaginal laceration, EBL 100cc, POD4; with left flank pain likely secondary to ureteral calculus vs stent and expected abdominal discomfort due to inadequate postop pain management, currently clinically and hemodynamically stable     - no acute GYN intervention at this time  - tylenol 650mg q6h/motrin 600mg q6h/gabapentin as prescribed   - encourage ambulation/PO hydration   - leg end elevation  - f/u with Dr. Hill on 11/25  - f/u with urology for management of ureteral calculus  - D/w Dr. Cortes   - Dispo per ED.

## 2019-11-23 NOTE — DISCHARGE NOTE PROVIDER - HOSPITAL COURSE
59 year old lady known to have HTN, IBD, hypothyroidism, morbid obesity, AUB s/p Robot assisted TAHBSO POD 4 with cystoscopy with left ureteral stent placement by urology intra-op presenting for left flank pain and abdominal site pain.    Flank pain was constant and severe (10/10) but resolved after being given Tylenol in ED.    She denies fever, chills. No nausea/vomiting. No urinary symptoms including hematuria. No vaginal bleeding or discharge    Patient has been constipated yesterday and took several pills of senna. She reports multiple episodes of soft stool today.     CT a/p showed post-operative changes and possible 4 mm uretral stone adjacent to stent. Patient was treated with IVF and her pain improved significantly. (1/10 on the morning of discharge). To be discharged home with outpatient follow up.

## 2019-11-23 NOTE — H&P ADULT - HISTORY OF PRESENT ILLNESS
59 year old ladyknown to have HTN, IBD, anxiety, hypothyroidism, morbid obesity ,AUB s/p Robot assissted TAHBSO POD 4 with cystoscopy with left ureteral stent placement by urology intra-op presenting for left flank pain and abdominal site pain.  Flank pain was constant and severe (10/10) but resolved after being given Tylenol in ED.  She denies fever, chills. No nausea/vomiting. No urinary symptoms including hematuria. No vaginal bleeding or discharge  Patient has been constipated yesterday and took several pills of senna. She reports multiple episodes of soft stool today. 59 year old lady known to have HTN, IBD, hypothyroidism, morbid obesity, AUB s/p Robot assisted TAHBSO POD 4 with cystoscopy with left ureteral stent placement by urology intra-op presenting for left flank pain and abdominal site pain.  Flank pain was constant and severe (10/10) but resolved after being given Tylenol in ED.  She denies fever, chills. No nausea/vomiting. No urinary symptoms including hematuria. No vaginal bleeding or discharge  Patient has been constipated yesterday and took several pills of senna. She reports multiple episodes of soft stool today.

## 2019-11-23 NOTE — H&P ADULT - ASSESSMENT
- no acute GYN intervention at this time  - tylenol 650mg q6h/motrin 600mg q6h/gabapentin as prescribed   - encourage ambulation/PO hydration   - leg end elevation  - f/u with Dr. Hill on 11/25  - f/u with urology for management of ureteral calculus # Left ureteral calculus  - 4 mm; adjacent to recently placed ureteral stent   - Continue IV hydration and pain management   - Patient given Zosyn and Flagyl in ED; No fever or WBC  - No indication for antibiotics  - F/u with urology    # s/p TAHBSO  - Pain management  - F/u with GYN    # Diarrhea  - Patient with history of IBD with alternating constipation/Diarrhea  - Took senna yesterday  - If patient has >3 episodes of loose stools/d send Cdiff PCR    # Lower extremity edema and left calf pain  - Obtain LE venous duplex    #Hypothyroidism  - Continue Synthyroid    # HTN  -Continue Procardia and HCTZ    #DVT and GI ppx

## 2019-11-23 NOTE — DISCHARGE NOTE NURSING/CASE MANAGEMENT/SOCIAL WORK - PATIENT PORTAL LINK FT
You can access the FollowMyHealth Patient Portal offered by [facility] by registering at the following website: [email or web address]. By joining [email or web address] FollowMyHealth portal, you will also be able to view your health information using other applications (apps) compatible with our system.

## 2019-11-23 NOTE — CONSULT NOTE ADULT - ASSESSMENT
59F s/p SAGAR 11/19 now presents with 10/10 flank pain which has improved in the ED with tylenol. Patient is afebrile, nontender on physical exam and has no leukocytosis. CT reveals subcutaneous emphysema (likely secondary to surgery), and a probable 4 mm L ureteral calculus which is likely the cause of her pain.   - No surgical intervention at this time  - Pain control as needed  - Consider urology consult for renal calculus  - OB/GYN post surgical follow up 59F s/p SAGAR 11/19 now presents with 10/10 flank pain which has improved in the ED with Tylenol upon surgical team evaluation stated pain is 1/10. Patient is afebrile, nontender on physical exam and has no leukocytosis. CT reveals subcutaneous emphysema (likely secondary to surgery), and a probable 4 mm L ureteral calculus which is likely the cause of her pain.   - No surgical intervention at this time  - Pain control as needed  - Consider urology consult for renal calculus - s/p stents on tuesday  - OB/GYN post surgical follow up as scheduled    Discussed with Dr. Martines

## 2019-11-23 NOTE — DISCHARGE NOTE PROVIDER - CARE PROVIDER_API CALL
Braulio Hill)  Obstetrics and Gynecology  [address] [place] [address]  Phone: [phone]  Fax: [phone]  Follow Up Time:

## 2019-11-23 NOTE — DISCHARGE NOTE PROVIDER - NSDCFUSCHEDAPPT_GEN_ALL_CORE_FT
CORY RECIO ; 11/25/2019 ; [facility] [address]  CORY RECIO ; 12/18/2019 ; [facility] [address]

## 2019-11-23 NOTE — CONSULT NOTE ADULT - SUBJECTIVE AND OBJECTIVE BOX
CORY RECIO [id number]  59y Female  1d    HPI: 59F S/P TAHSBO  (d/c ) presents with 10/10 constant L flank pain which started suddenly 4 horus ago. Pain now improved in ED with tylenol. Admits to constipation yesterday which she took multiple doses of Senna for, resulting in multiple episodes of diarrhea today. Denies fever, SOB, vomiting, nausea.      PAST MEDICAL & SURGICAL HISTORY:  H/O melanoma excision  Lung nodule  Migraines  Irritable bowel disease  Fibromyalgia  GERD (gastroesophageal reflux disease)  Hypothyroidism  HTN (hypertension)  History of left knee replacement  History of D&C  S/P laparoscopic cholecystectomy  S/P thyroidectomy        MEDICATIONS  (STANDING):  piperacillin/tazobactam IVPB. 3.375 Gram(s) IV Intermittent Once    MEDICATIONS  (PRN):      Allergies    Accupril (Rash)  caffeine (Anaphylaxis)  latex (Rash)  Micardis (Rash)  Sudafed (Other)    Intolerances        REVIEW OF SYSTEMS    [X] A ten-point review of systems was otherwise negative except as noted.  [ ] Due to altered mental status/intubation, subjective information were not able to be obtained from the patient. History was obtained, to the extent possible, from review of the chart and collateral sources of information.      Vital Signs Last 24 Hrs  T(C): 36.3 (2019 00:32), Max: 36.3 (2019 00:32)  T(F): 97.3 (2019 00:32), Max: 97.3 (2019 00:32)  HR: 75 (2019 00:32) (75 - 82)  BP: 181/86 (2019 00:32) (181/86 - 182/86)  BP(mean): --  RR: 18 (2019 00:32) (18 - 18)  SpO2: 96% (2019 00:32) (96% - 99%)    PHYSICAL EXAM:  GENERAL: NAD, well-appearing  CHEST/LUNG: Clear to auscultation bilaterally  HEART: Regular rate and rhythm  ABDOMEN: Soft, Nontender, Nondistended;   EXTREMITIES:  No clubbing, cyanosis, or edema      LABS:  Labs:  CAPILLARY BLOOD GLUCOSE                              11.9   8.83  )-----------( 317      ( 2019 23:23 )             37.5       Auto Immature Granulocyte %: 0.5 % (19 @ 23:23)  Auto Neutrophil %: 76.1 % (19 @ 23:23)        139  |  101  |  17  ----------------------------<  130<H>  5.0   |  23  |  0.9      Calcium, Total Serum: 8.4 mg/dL (19 @ 23:23)      LFTs:             6.7  | 0.5  | 30       ------------------[64      ( 2019 23:23 )  4.0  | x    | 24          Lipase:32     Amylase:x         Lactate, Blood: 1.9 mmol/L (19 @ 23:23)      Coags:  < from: CT Abdomen and Pelvis No Cont (19 @ 00:41) >  IMPRESSION:    Diffuse subcutaneous emphysema of the anterior abdominal wall, extending   posteriorly at the level of the epigastrium, and extending inferiorly   along the anterior thighs andvaginal labia. Findings likely representing   subcutaneous insufflation from robotic SIDNEY/BSO from 4 days prior.    Moderate intravesicular air; correlate for recent history of urinary   catheterization. Otherwise correlate with urinalysis.    Mild scattered fluid in the left pelvis abutting the sigmoid colon   without discrete collection. Findings may represent postoperative fluid,   however inflammatory process is not excluded.    Probable 4 mm mid to distal left ureteral calculus adjacent to  the ureteral stent. Please correlate clinically.    < end of copied text >            Urinalysis Basic - ( 2019 23:23 )    Color: Colorless / Appearance: Slightly Turbid / S.009 / pH: x  Gluc: x / Ketone: Negative  / Bili: Negative / Urobili: <2 mg/dL   Blood: x / Protein: 30 mg/dL / Nitrite: Negative   Leuk Esterase: Large / RBC: 161 /HPF / WBC 55 /HPF   Sq Epi: x / Non Sq Epi: 0 /HPF / Bacteria: Negative            RADIOLOGY & ADDITIONAL STUDIES: CORY RECIO [id number]  59y Female  1d    HPI: 59F S/P TAHSBO  (d/c ) presents with 10/10 constant L flank pain which started suddenly 4 horus ago. Pain now improved in ED with tylenol. Admits to constipation yesterday which she took multiple doses of Senna for, resulting in multiple episodes of diarrhea today. Denies fever, SOB, vomiting, nausea.      PAST MEDICAL & SURGICAL HISTORY:  H/O melanoma excision  Lung nodule  Migraines  Irritable bowel disease  Fibromyalgia  GERD (gastroesophageal reflux disease)  Hypothyroidism  HTN (hypertension)  History of left knee replacement  History of D&C  S/P laparoscopic cholecystectomy  S/P thyroidectomy        MEDICATIONS  (STANDING):  piperacillin/tazobactam IVPB. 3.375 Gram(s) IV Intermittent Once    MEDICATIONS  (PRN):      Allergies    Accupril (Rash)  caffeine (Anaphylaxis)  latex (Rash)  Micardis (Rash)  Sudafed (Other)    Intolerances        REVIEW OF SYSTEMS    [X] A ten-point review of systems was otherwise negative except as noted.  [ ] Due to altered mental status/intubation, subjective information were not able to be obtained from the patient. History was obtained, to the extent possible, from review of the chart and collateral sources of information.      Vital Signs Last 24 Hrs  T(C): 36.3 (2019 00:32), Max: 36.3 (2019 00:32)  T(F): 97.3 (2019 00:32), Max: 97.3 (2019 00:32)  HR: 75 (2019 00:32) (75 - 82)  BP: 181/86 (2019 00:32) (181/86 - 182/86)  BP(mean): --  RR: 18 (2019 00:32) (18 - 18)  SpO2: 96% (2019 00:32) (96% - 99%)    PHYSICAL EXAM:  GENERAL: NAD, well-appearing  CHEST/LUNG: Clear to auscultation bilaterally  HEART: Regular rate and rhythm  ABDOMEN: Soft, Nontender, Nondistended;   EXTREMITIES:  No clubbing, cyanosis, or edema      LABS:  Labs:  CAPILLARY BLOOD GLUCOSE                              11.9   8.83  )-----------( 317      ( 2019 23:23 )             37.5       Auto Immature Granulocyte %: 0.5 % (19 @ 23:23)  Auto Neutrophil %: 76.1 % (19 @ 23:23)        139  |  101  |  17  ----------------------------<  130<H>  5.0   |  23  |  0.9      Calcium, Total Serum: 8.4 mg/dL (19 @ 23:23)      LFTs:             6.7  | 0.5  | 30       ------------------[64      ( 2019 23:23 )  4.0  | x    | 24          Lipase:32     Amylase:x         Lactate, Blood: 1.9 mmol/L (19 @ 23:23)      Coags:          Urinalysis Basic - ( 2019 23:23 )    Color: Colorless / Appearance: Slightly Turbid / S.009 / pH: x  Gluc: x / Ketone: Negative  / Bili: Negative / Urobili: <2 mg/dL   Blood: x / Protein: 30 mg/dL / Nitrite: Negative   Leuk Esterase: Large / RBC: 161 /HPF / WBC 55 /HPF   Sq Epi: x / Non Sq Epi: 0 /HPF / Bacteria: Negative            RADIOLOGY & ADDITIONAL STUDIES:  < from: CT Abdomen and Pelvis No Cont (19 @ 00:41) >  IMPRESSION:    Diffuse subcutaneous emphysema of the anterior abdominal wall, extending   posteriorly at the level of the epigastrium, and extending inferiorly   along the anterior thighs andvaginal labia. Findings likely representing   subcutaneous insufflation from robotic SIDNEY/BSO from 4 days prior.    Moderate intravesicular air; correlate for recent history of urinary   catheterization. Otherwise correlate with urinalysis.    Mild scattered fluid in the left pelvis abutting the sigmoid colon   without discrete collection. Findings may represent postoperative fluid,   however inflammatory process is not excluded.    Probable 4 mm mid to distal left ureteral calculus adjacent to  the ureteral stent. Please correlate clinically.    < end of copied text >

## 2019-11-23 NOTE — H&P ADULT - NSHPPHYSICALEXAM_GEN_ALL_CORE
VITAL SIGNS: Last 24 Hours  T(C): 36.4 (23 Nov 2019 03:03), Max: 36.4 (23 Nov 2019 03:03)  T(F): 97.5 (23 Nov 2019 03:03), Max: 97.5 (23 Nov 2019 03:03)  HR: 88 (23 Nov 2019 03:03) (75 - 88)  BP: 116/61 (23 Nov 2019 03:03) (116/61 - 182/86)  BP(mean): --  RR: 18 (23 Nov 2019 03:03) (18 - 18)  SpO2: 97% (23 Nov 2019 03:03) (96% - 99%)      PHYSICAL EXAM:    GENERAL: NAD, well-developed, AAOx3  HEENT:  Atraumatic, Normocephalic. EOMI, PERRLA, conjunctiva and sclera clear, No JVD  PULMONARY: Clear to auscultation bilaterally; No wheeze  CARDIOVASCULAR: Regular rate and rhythm; No murmurs, rubs, or gallops  GASTROINTESTINAL: Soft, Nontender, Nondistended; Left flank tenderness  MUSCULOSKELETAL:  bilateral 1+ nonpitting edema, left calf slightly tender  NEUROLOGY: non-focal  SKIN: No rashes or lesions

## 2019-11-23 NOTE — CONSULT NOTE ADULT - SUBJECTIVE AND OBJECTIVE BOX
CORY RECIO   59y   Female   [id number]    Chief Complaint:    HPI:    MEDICATIONS  (STANDING):  piperacillin/tazobactam IVPB. 3.375 Gram(s) IV Intermittent Once    MEDICATIONS  (PRN):      Allergies    Accupril (Rash)  caffeine (Anaphylaxis)  latex (Rash)  Micardis (Rash)  Sudafed (Other)    Intolerances        PAST MEDICAL & SURGICAL HISTORY:  H/O melanoma excision  Lung nodule  Migraines  Irritable bowel disease  Fibromyalgia  GERD (gastroesophageal reflux disease)  Hypothyroidism  HTN (hypertension)  History of left knee replacement  History of D&C  S/P laparoscopic cholecystectomy  S/P thyroidectomy      OB/GYN HISTORY:   LMP:            Cycle Length:              Days Flow:                                       Gyn: denies history of abnormal pap, STI, ovarian cysts, or uterine fibroids                                      Obstetric:  G  P                                        Last Pap smear:                                        Last mammogram:                                       Last Colonoscopy:    FAMILY HISTORY:  Family history of renal disease  Family history of early CAD  FH: senile dementia      SOCIAL HISTORY: Denies cigarette use, alcohol use, or illicit drug use    Vital Signs Last 24 Hrs  T(C): 36.3 (2019 00:32), Max: 36.3 (2019 00:32)  T(F): 97.3 (2019 00:32), Max: 97.3 (2019 00:32)  HR: 75 (2019 00:32) (75 - 82)  BP: 181/86 (2019 00:32) (181/86 - 182/86)  BP(mean): --  RR: 18 (2019 00:32) (18 - 18)  SpO2: 96% (2019 00:32) (96% - 99%)    Physical Exam:  Constitutional: AAOx3, NAD  Gastrointestinal: Soft, nontender, nondistended, no rebound, guarding, or rigidity  Pelvic:   external genitalia:   vagina:   uterus:   adnexae:   cervix:   Rectal:    LABS:                        11.9   8.83  )-----------( 317      ( 2019 23:23 )             37.5             139  |  101  |  17  ----------------------------<  130<H>  5.0   |  23  |  0.9    Ca    8.4<L>      2019 23:23  Mg     1.9         TPro  6.7  /  Alb  4.0  /  TBili  0.5  /  DBili  x   /  AST  30  /  ALT  24  /  AlkPhos  64        Urinalysis Basic - ( 2019 23:23 )    Color: Colorless / Appearance: Slightly Turbid / S.009 / pH: x  Gluc: x / Ketone: Negative  / Bili: Negative / Urobili: <2 mg/dL   Blood: x / Protein: 30 mg/dL / Nitrite: Negative   Leuk Esterase: Large / RBC: x / WBC x   Sq Epi: x / Non Sq Epi: x / Bacteria: x          RADIOLOGY & ADDITIONAL STUDIES: CORY RECIO   59y   Female   [id number]    Chief Complaint: left flank pain    HPI: 58yo P0, PMHx significant for HTN, IBD, anxiety, hypothyroidism s/p total thyroidectomy, morbid obesity, S/P  RATLH/BSO, cystoscopy with left ureteral stent placement by urology intra-op, and repair of vaginal laceration, EBL 100cc, POD4; c/o left flank pain and intermittent lower abdominal pain/pressure x1 day. Did not take any pain meds today. She reports multiple episodes of soft stool today. Denies fever, chills, nausea/vomiting, diarrhea/constipation, dysuria, urgency, frequency, hematuria. Denies vaginal bleeding/foul smelling vaginal discharge, incisional drainage. Also reports b/l swelling of lower extremities postoperatively which has improved. Denies cramping/erythema. Denies dizziness/lightheadedness/CP/SOB/palpitations.     MEDICATIONS in ED:  piperacillin/tazobactam IVPB. 3.375 Gram(s) IV Intermittent Once        Allergies    Accupril (Rash)  caffeine (Anaphylaxis)  latex (Rash)  Micardis (Rash)  Sudafed (Other)        PAST MEDICAL & SURGICAL HISTORY:  H/O melanoma excision  Lung nodule  Migraines  Irritable bowel disease  Fibromyalgia  GERD (gastroesophageal reflux disease)  Hypothyroidism  HTN (hypertension)  History of left knee replacement  History of D&C  S/P laparoscopic cholecystectomy  S/P thyroidectomy      OB/GYN HISTORY:         nulliparous                                         FAMILY HISTORY:  Family history of renal disease  Family history of early CAD  FH: senile dementia      SOCIAL HISTORY: Denies cigarette use, alcohol use, or illicit drug use    Vital Signs Last 24 Hrs  T(C): 36.3 (2019 00:32), Max: 36.3 (2019 00:32)  T(F): 97.3 (2019 00:32), Max: 97.3 (2019 00:32)  HR: 75 (2019 00:32) (75 - 82)  BP: 181/86 (2019 00:32) (181/86 - 182/86)  RR: 18 (2019 00:32) (18 - 18)  SpO2: 96% (2019 00:32) (96% - 99%)    Physical Exam:  Constitutional: AAOx3, NAD  Gastrointestinal: Soft, nontender, nondistended, no rebound, guarding, or rigidity; no crepitus; no CVA tenderness  Incisions: c/d/i   Pelvic:   external genitalia: normal, no lesions  vagina: cuff palpated- intact  uterus: absent   adnexae: absent  cervix: absent  Rectal: deferred   Ext: B/L pitting edema; no tenderness/erythema, Joceline's sign neg    LABS:                        11.9   8.83  )-----------( 317      ( 2019 23:23 )             37.5             139  |  101  |  17  ----------------------------<  130<H>  5.0   |  23  |  0.9    Ca    8.4<L>      2019 23:23  Mg     1.9         TPro  6.7  /  Alb  4.0  /  TBili  0.5  /  DBili  x   /  AST  30  /  ALT  24  /  AlkPhos  64        Urinalysis Basic - ( 2019 23:23 )    Color: Colorless / Appearance: Slightly Turbid / S.009 / pH: x  Gluc: x / Ketone: Negative  / Bili: Negative / Urobili: <2 mg/dL   Blood: x / Protein: 30 mg/dL / Nitrite: Negative   Leuk Esterase: Large / RBC: x / WBC x   Sq Epi: x / Non Sq Epi: x / Bacteria: x          RADIOLOGY & ADDITIONAL STUDIES: < from: CT Abdomen and Pelvis No Cont (19 @ 00:41) >  FINDINGS:    LOWER CHEST: 6 mm right lower lobe solidnodule stable since at least   , indicative of benignity (series 5, image 16). Left lower lobe 3 mm   solid nodule (series 5, image 57) possibly seen on prior CT (obscured),   however no dedicated follow-up indicated for lesion of this size.    HEPATOBILIARY: Postcholecystectomy. No definite hepatic masses. No intra   or extrahepatic biliary ductal dilatation.    SPLEEN: Unremarkable.    PANCREAS: Unremarkable.    ADRENAL GLANDS: Unremarkable.    KIDNEYS: Left double-J ureteral stent in appropriate position.  Apparent 4 mm ureteral calculus adjacent to the ureteral stent at the   level of L5-S1 junction (3/63). No hydronephrosis. No urinary calculi.    ABDOMINOPELVIC NODES: No abdominopelvic lymphadenopathy.    PELVIC ORGANS: Moderate intravesicular air; correlate with recent history   of catheterization.    PERITONEUM/MESENTERY/BOWEL: Mild fluid in the low abdomen abutting the   sigmoid colon; no discrete collection or free intraperitoneal air. No   evidence of bowel obstruction. Appendix is normal.    BONES/SOFT TISSUES: No acute osseous abnormality.    OTHER: Diffuse subcutaneous emphysema extending from the bilateral labia   majora/along the bilateral anterior thighs, superiorly along the anterior   abdominal wall, and wrapping around posteriorly at the level of the   epigastrium.     IMPRESSION:    Diffuse subcutaneous emphysema of the anterior abdominal wall, extending   posteriorly at the level of the epigastrium, and extending inferiorly   along the anterior thighs andvaginal labia. Findings likely representing   subcutaneous insufflation from robotic SIDNEY/BSO from 4 days prior.    Moderate intravesicular air; correlate for recent history of urinary   catheterization. Otherwise correlate with urinalysis.    Mild scattered fluid in the left pelvis abutting the sigmoid colon   without discrete collection. Findings may represent postoperative fluid,   however inflammatory process is not excluded.    Probable 4 mm mid to distal left ureteral calculus adjacent to  the ureteral stent. Please correlate clinically.    < end of copied text >

## 2019-11-23 NOTE — ED ADULT NURSE NOTE - OBJECTIVE STATEMENT
patient presented to the ED complaining of abdominal pain x1 day, with nausea. recent hx of total hysterectomy

## 2019-11-23 NOTE — H&P ADULT - ATTENDING COMMENTS
Patient seen and examined at bedside. Patient states pain has resolved and she possibly passed the stone. She also states she passed flatus which added to the relief. She has peripheral edema with LLE calf tenderness, will get LE duplex to rule out given recent surgery. Can be discharged today after the duplex.     #Progress Note Handoff  Pending (specify):  LE duplex  Family discussion: roland pt plan of care  Disposition: Home

## 2019-11-24 LAB
HCV AB S/CO SERPL IA: 0.13 S/CO — SIGNIFICANT CHANGE UP (ref 0–0.99)
HCV AB SERPL-IMP: SIGNIFICANT CHANGE UP

## 2019-11-25 ENCOUNTER — OUTPATIENT (OUTPATIENT)
Dept: OUTPATIENT SERVICES | Facility: HOSPITAL | Age: 59
LOS: 1 days | Discharge: HOME | End: 2019-11-25

## 2019-11-25 ENCOUNTER — APPOINTMENT (OUTPATIENT)
Dept: OBGYN | Facility: CLINIC | Age: 59
End: 2019-11-25
Payer: COMMERCIAL

## 2019-11-25 VITALS — WEIGHT: 253 LBS | SYSTOLIC BLOOD PRESSURE: 120 MMHG | DIASTOLIC BLOOD PRESSURE: 70 MMHG | BODY MASS INDEX: 43.43 KG/M2

## 2019-11-25 DIAGNOSIS — Z90.710 ACQUIRED ABSENCE OF BOTH CERVIX AND UTERUS: Chronic | ICD-10-CM

## 2019-11-25 DIAGNOSIS — Z98.890 OTHER SPECIFIED POSTPROCEDURAL STATES: Chronic | ICD-10-CM

## 2019-11-25 DIAGNOSIS — Z90.49 ACQUIRED ABSENCE OF OTHER SPECIFIED PARTS OF DIGESTIVE TRACT: Chronic | ICD-10-CM

## 2019-11-25 DIAGNOSIS — Z96.652 PRESENCE OF LEFT ARTIFICIAL KNEE JOINT: Chronic | ICD-10-CM

## 2019-11-25 LAB — SURGICAL PATHOLOGY STUDY: SIGNIFICANT CHANGE UP

## 2019-11-25 PROCEDURE — 99024 POSTOP FOLLOW-UP VISIT: CPT

## 2019-11-26 DIAGNOSIS — Z09 ENCOUNTER FOR FOLLOW-UP EXAMINATION AFTER COMPLETED TREATMENT FOR CONDITIONS OTHER THAN MALIGNANT NEOPLASM: ICD-10-CM

## 2019-11-27 DIAGNOSIS — Z82.49 FAMILY HISTORY OF ISCHEMIC HEART DISEASE AND OTHER DISEASES OF THE CIRCULATORY SYSTEM: ICD-10-CM

## 2019-11-27 DIAGNOSIS — Z85.820 PERSONAL HISTORY OF MALIGNANT MELANOMA OF SKIN: ICD-10-CM

## 2019-11-27 DIAGNOSIS — Z79.82 LONG TERM (CURRENT) USE OF ASPIRIN: ICD-10-CM

## 2019-11-27 DIAGNOSIS — R10.9 UNSPECIFIED ABDOMINAL PAIN: ICD-10-CM

## 2019-11-27 DIAGNOSIS — Z91.048 OTHER NONMEDICINAL SUBSTANCE ALLERGY STATUS: ICD-10-CM

## 2019-11-27 DIAGNOSIS — Z96.652 PRESENCE OF LEFT ARTIFICIAL KNEE JOINT: ICD-10-CM

## 2019-11-27 DIAGNOSIS — Z79.899 OTHER LONG TERM (CURRENT) DRUG THERAPY: ICD-10-CM

## 2019-11-27 DIAGNOSIS — K58.9 IRRITABLE BOWEL SYNDROME WITHOUT DIARRHEA: ICD-10-CM

## 2019-11-27 DIAGNOSIS — T81.82XA EMPHYSEMA (SUBCUTANEOUS) RESULTING FROM A PROCEDURE, INITIAL ENCOUNTER: ICD-10-CM

## 2019-11-27 DIAGNOSIS — N20.1 CALCULUS OF URETER: ICD-10-CM

## 2019-11-27 DIAGNOSIS — Z91.040 LATEX ALLERGY STATUS: ICD-10-CM

## 2019-11-27 DIAGNOSIS — K52.9 NONINFECTIVE GASTROENTERITIS AND COLITIS, UNSPECIFIED: ICD-10-CM

## 2019-11-27 DIAGNOSIS — Z90.710 ACQUIRED ABSENCE OF BOTH CERVIX AND UTERUS: ICD-10-CM

## 2019-11-27 DIAGNOSIS — Z88.8 ALLERGY STATUS TO OTHER DRUGS, MEDICAMENTS AND BIOLOGICAL SUBSTANCES: ICD-10-CM

## 2019-11-27 DIAGNOSIS — M79.7 FIBROMYALGIA: ICD-10-CM

## 2019-11-27 DIAGNOSIS — I10 ESSENTIAL (PRIMARY) HYPERTENSION: ICD-10-CM

## 2019-11-27 DIAGNOSIS — K21.9 GASTRO-ESOPHAGEAL REFLUX DISEASE WITHOUT ESOPHAGITIS: ICD-10-CM

## 2019-11-27 DIAGNOSIS — E89.0 POSTPROCEDURAL HYPOTHYROIDISM: ICD-10-CM

## 2019-11-27 DIAGNOSIS — Z90.722 ACQUIRED ABSENCE OF OVARIES, BILATERAL: ICD-10-CM

## 2019-11-27 DIAGNOSIS — E66.01 MORBID (SEVERE) OBESITY DUE TO EXCESS CALORIES: ICD-10-CM

## 2019-12-06 ENCOUNTER — TRANSCRIPTION ENCOUNTER (OUTPATIENT)
Age: 59
End: 2019-12-06

## 2019-12-09 ENCOUNTER — OTHER (OUTPATIENT)
Age: 59
End: 2019-12-09

## 2019-12-10 ENCOUNTER — OTHER (OUTPATIENT)
Age: 59
End: 2019-12-10

## 2019-12-11 ENCOUNTER — FORM ENCOUNTER (OUTPATIENT)
Age: 59
End: 2019-12-11

## 2019-12-12 ENCOUNTER — OUTPATIENT (OUTPATIENT)
Dept: OUTPATIENT SERVICES | Facility: HOSPITAL | Age: 59
LOS: 1 days | Discharge: HOME | End: 2019-12-12
Payer: COMMERCIAL

## 2019-12-12 DIAGNOSIS — Z96.652 PRESENCE OF LEFT ARTIFICIAL KNEE JOINT: Chronic | ICD-10-CM

## 2019-12-12 DIAGNOSIS — Z98.890 OTHER SPECIFIED POSTPROCEDURAL STATES: Chronic | ICD-10-CM

## 2019-12-12 DIAGNOSIS — Z90.710 ACQUIRED ABSENCE OF BOTH CERVIX AND UTERUS: Chronic | ICD-10-CM

## 2019-12-12 DIAGNOSIS — Z90.49 ACQUIRED ABSENCE OF OTHER SPECIFIED PARTS OF DIGESTIVE TRACT: Chronic | ICD-10-CM

## 2019-12-12 DIAGNOSIS — Z12.31 ENCOUNTER FOR SCREENING MAMMOGRAM FOR MALIGNANT NEOPLASM OF BREAST: ICD-10-CM

## 2019-12-12 PROCEDURE — 77063 BREAST TOMOSYNTHESIS BI: CPT | Mod: 26

## 2019-12-12 PROCEDURE — 77067 SCR MAMMO BI INCL CAD: CPT | Mod: 26

## 2019-12-18 ENCOUNTER — APPOINTMENT (OUTPATIENT)
Dept: ORTHOPEDIC SURGERY | Facility: CLINIC | Age: 59
End: 2019-12-18
Payer: OTHER MISCELLANEOUS

## 2019-12-18 VITALS — WEIGHT: 253 LBS | HEIGHT: 64 IN | BODY MASS INDEX: 43.19 KG/M2

## 2019-12-18 PROCEDURE — 99213 OFFICE O/P EST LOW 20 MIN: CPT

## 2019-12-20 ENCOUNTER — OUTPATIENT (OUTPATIENT)
Dept: OUTPATIENT SERVICES | Facility: HOSPITAL | Age: 59
LOS: 1 days | Discharge: HOME | End: 2019-12-20
Payer: COMMERCIAL

## 2019-12-20 DIAGNOSIS — Z96.652 PRESENCE OF LEFT ARTIFICIAL KNEE JOINT: Chronic | ICD-10-CM

## 2019-12-20 DIAGNOSIS — R92.8 OTHER ABNORMAL AND INCONCLUSIVE FINDINGS ON DIAGNOSTIC IMAGING OF BREAST: ICD-10-CM

## 2019-12-20 DIAGNOSIS — Z90.49 ACQUIRED ABSENCE OF OTHER SPECIFIED PARTS OF DIGESTIVE TRACT: Chronic | ICD-10-CM

## 2019-12-20 DIAGNOSIS — Z98.890 OTHER SPECIFIED POSTPROCEDURAL STATES: Chronic | ICD-10-CM

## 2019-12-20 DIAGNOSIS — Z90.710 ACQUIRED ABSENCE OF BOTH CERVIX AND UTERUS: Chronic | ICD-10-CM

## 2019-12-20 PROCEDURE — G0279: CPT | Mod: 26,LT

## 2019-12-20 PROCEDURE — 77065 DX MAMMO INCL CAD UNI: CPT | Mod: 26,LT

## 2019-12-20 PROCEDURE — 76642 ULTRASOUND BREAST LIMITED: CPT | Mod: 26,LT

## 2019-12-24 ENCOUNTER — OUTPATIENT (OUTPATIENT)
Dept: OUTPATIENT SERVICES | Facility: HOSPITAL | Age: 59
LOS: 1 days | Discharge: HOME | End: 2019-12-24
Payer: COMMERCIAL

## 2019-12-24 ENCOUNTER — RESULT REVIEW (OUTPATIENT)
Age: 59
End: 2019-12-24

## 2019-12-24 DIAGNOSIS — Z96.652 PRESENCE OF LEFT ARTIFICIAL KNEE JOINT: Chronic | ICD-10-CM

## 2019-12-24 DIAGNOSIS — Z98.890 OTHER SPECIFIED POSTPROCEDURAL STATES: Chronic | ICD-10-CM

## 2019-12-24 DIAGNOSIS — Z90.49 ACQUIRED ABSENCE OF OTHER SPECIFIED PARTS OF DIGESTIVE TRACT: Chronic | ICD-10-CM

## 2019-12-24 DIAGNOSIS — Z90.710 ACQUIRED ABSENCE OF BOTH CERVIX AND UTERUS: Chronic | ICD-10-CM

## 2019-12-24 PROCEDURE — 88305 TISSUE EXAM BY PATHOLOGIST: CPT | Mod: 26

## 2019-12-24 PROCEDURE — 19081 BX BREAST 1ST LESION STRTCTC: CPT | Mod: LT

## 2019-12-24 PROCEDURE — 19082 BX BREAST ADD LESION STRTCTC: CPT | Mod: LT

## 2019-12-24 PROCEDURE — 88360 TUMOR IMMUNOHISTOCHEM/MANUAL: CPT | Mod: 26

## 2019-12-26 LAB — SURGICAL PATHOLOGY STUDY: SIGNIFICANT CHANGE UP

## 2019-12-27 NOTE — PHYSICAL EXAM
[de-identified] : Left Knee: skin is intact, no erythema, surgical scar is well healed on anterior knee. no joint effusion. + medial joint line tenderness, PCL is intact with posterior drawer, negative Lachman, no collateral ligamentous laxity with stress. ROM is 0-100 degrees with no pain. DF/PF/EHL intact with 5/5 strength. 2+ DP pulse. SILT L3-S1.

## 2019-12-27 NOTE — ASSESSMENT
[FreeTextEntry1] : Assessment/Plan\par \par Painful left TKR\par Lower back pain with sciatica \par \par Patient will have a MRI the L-spine and EMG test\par \par F/U in 7 days\par \par \par All medical record entries made by the PA/Scribe/Fellow are at my, Dr. Velasquez Chatman's direction and personally dictated by me on 12/18/2019]. I have reviewed the chart and agree that the record accurately reflects my personal performance of the history, physical exam, assessment, and plan. I have also personally directed reviewed, and agreed with the chart.\par \par

## 2019-12-27 NOTE — HISTORY OF PRESENT ILLNESS
[de-identified] : Holli is a 59 year old female here for follow up of left knee pain. Knee pain increased since last week. She has chronic lower back pain that has recently worsened as well. Her back pain travels down bilateral legs, L>R. The back pain is associated with numbness and tingling traveling down into her feet, L>R.

## 2019-12-30 ENCOUNTER — OUTPATIENT (OUTPATIENT)
Dept: OUTPATIENT SERVICES | Facility: HOSPITAL | Age: 59
LOS: 1 days | Discharge: HOME | End: 2019-12-30

## 2019-12-30 ENCOUNTER — APPOINTMENT (OUTPATIENT)
Dept: OBGYN | Facility: CLINIC | Age: 59
End: 2019-12-30
Payer: COMMERCIAL

## 2019-12-30 VITALS
WEIGHT: 254 LBS | BODY MASS INDEX: 43.36 KG/M2 | HEIGHT: 64 IN | SYSTOLIC BLOOD PRESSURE: 116 MMHG | DIASTOLIC BLOOD PRESSURE: 80 MMHG

## 2019-12-30 DIAGNOSIS — Z98.890 OTHER SPECIFIED POSTPROCEDURAL STATES: Chronic | ICD-10-CM

## 2019-12-30 DIAGNOSIS — Z90.49 ACQUIRED ABSENCE OF OTHER SPECIFIED PARTS OF DIGESTIVE TRACT: Chronic | ICD-10-CM

## 2019-12-30 DIAGNOSIS — Z90.710 ACQUIRED ABSENCE OF BOTH CERVIX AND UTERUS: Chronic | ICD-10-CM

## 2019-12-30 DIAGNOSIS — Z96.652 PRESENCE OF LEFT ARTIFICIAL KNEE JOINT: Chronic | ICD-10-CM

## 2019-12-30 PROCEDURE — 99024 POSTOP FOLLOW-UP VISIT: CPT

## 2019-12-30 NOTE — PHYSICAL EXAM
[Normal] : urethra [No Bleeding] : there was no active vaginal bleeding [Absent] : absent [Adnexa Absent] : absent bilaterally

## 2019-12-31 DIAGNOSIS — C50.912 MALIGNANT NEOPLASM OF UNSPECIFIED SITE OF LEFT FEMALE BREAST: ICD-10-CM

## 2019-12-31 DIAGNOSIS — Z09 ENCOUNTER FOR FOLLOW-UP EXAMINATION AFTER COMPLETED TREATMENT FOR CONDITIONS OTHER THAN MALIGNANT NEOPLASM: ICD-10-CM

## 2019-12-31 DIAGNOSIS — Z17.0 ESTROGEN RECEPTOR POSITIVE STATUS [ER+]: ICD-10-CM

## 2020-01-02 ENCOUNTER — APPOINTMENT (OUTPATIENT)
Dept: BREAST CENTER | Facility: CLINIC | Age: 60
End: 2020-01-02
Payer: COMMERCIAL

## 2020-01-02 VITALS
HEIGHT: 64 IN | TEMPERATURE: 98.2 F | DIASTOLIC BLOOD PRESSURE: 80 MMHG | WEIGHT: 254 LBS | SYSTOLIC BLOOD PRESSURE: 132 MMHG | BODY MASS INDEX: 43.36 KG/M2

## 2020-01-02 DIAGNOSIS — Z78.9 OTHER SPECIFIED HEALTH STATUS: ICD-10-CM

## 2020-01-02 DIAGNOSIS — Z80.3 FAMILY HISTORY OF MALIGNANT NEOPLASM OF BREAST: ICD-10-CM

## 2020-01-02 PROCEDURE — 99215 OFFICE O/P EST HI 40 MIN: CPT

## 2020-01-02 PROCEDURE — 99205 OFFICE O/P NEW HI 60 MIN: CPT

## 2020-01-08 ENCOUNTER — TRANSCRIPTION ENCOUNTER (OUTPATIENT)
Age: 60
End: 2020-01-08

## 2020-01-13 ENCOUNTER — APPOINTMENT (OUTPATIENT)
Dept: NEUROLOGY | Facility: CLINIC | Age: 60
End: 2020-01-13
Payer: COMMERCIAL

## 2020-01-13 VITALS
BODY MASS INDEX: 41.15 KG/M2 | SYSTOLIC BLOOD PRESSURE: 125 MMHG | HEIGHT: 65 IN | DIASTOLIC BLOOD PRESSURE: 78 MMHG | OXYGEN SATURATION: 99 % | WEIGHT: 247 LBS | TEMPERATURE: 98.6 F | HEART RATE: 81 BPM

## 2020-01-13 DIAGNOSIS — M53.9 DORSOPATHY, UNSPECIFIED: ICD-10-CM

## 2020-01-13 PROCEDURE — 99214 OFFICE O/P EST MOD 30 MIN: CPT

## 2020-01-13 RX ORDER — LEVOTHYROXINE SODIUM 200 UG/1
200 TABLET ORAL
Refills: 0 | Status: DISCONTINUED | COMMUNITY
End: 2020-01-13

## 2020-01-13 RX ORDER — HYALURONATE SODIUM, STABILIZED 88 MG/4 ML
88 SYRINGE (ML) INTRAARTICULAR
Qty: 1 | Refills: 0 | Status: DISCONTINUED | OUTPATIENT
Start: 2019-03-06 | End: 2020-01-13

## 2020-01-13 RX ORDER — METOPROLOL SUCCINATE 100 MG/1
TABLET, EXTENDED RELEASE ORAL
Refills: 0 | Status: DISCONTINUED | COMMUNITY
End: 2020-01-13

## 2020-01-13 RX ORDER — HYDROCHLOROTHIAZIDE 12.5 MG/1
TABLET ORAL
Refills: 0 | Status: DISCONTINUED | COMMUNITY
End: 2020-01-13

## 2020-01-13 RX ORDER — NIFEDIPINE 30 MG/1
30 TABLET, FILM COATED, EXTENDED RELEASE ORAL
Qty: 90 | Refills: 0 | Status: DISCONTINUED | COMMUNITY
Start: 2017-05-01 | End: 2020-01-13

## 2020-01-13 RX ORDER — HYALURONATE SODIUM, STABILIZED 88 MG/4 ML
88 SYRINGE (ML) INTRAARTICULAR
Qty: 1 | Refills: 0 | Status: DISCONTINUED | COMMUNITY
Start: 2019-04-05 | End: 2020-01-13

## 2020-01-14 ENCOUNTER — FORM ENCOUNTER (OUTPATIENT)
Age: 60
End: 2020-01-14

## 2020-01-14 PROBLEM — M53.9 SPINAL DISEASE: Status: ACTIVE | Noted: 2020-01-14

## 2020-01-14 NOTE — HISTORY OF PRESENT ILLNESS
[FreeTextEntry1] : Georgia her for followup in fact she was seen last in March 2019 in the context of having headache. At the time it is my opinion that the headache was mostly secondary to her sleep issues and also lack of good blood pressure control.\par Her past medical history is significant for having hypertension hypothyroidism, anxiety disorder. She is a status post left knee total replacement surgery. Unfortunately she did not recover as good as she wanted from that surgery. Aside from that she also has history of melanoma and postmenopausal bleeding. Recently she did have workup done by OB/GYN I am not sure of the extent of the surgery. Aside from that she was recently also diagnosed the left breast lump as she is being worked up for that reason as well.\par Meanwhile she continues to be quite functional she just came back after her OB/GYN procedure now she is here mostly for having aches and pains and a sense of tightness that involves the lateral aspect of the left hand and fifth finger on the right side and also achiness and pain of the thumb on the left side. She does continue to have aches and pains of the neck and lower back mostly on the left side and radiating pain to the left buttocks.\par She says occasionally right she was receiving physical therapy for her left knee she was having some adjustments done by the physical therapist on her back and it was always helpful\par There has been no change in her bladder function. Her gait is stable.

## 2020-01-14 NOTE — PHYSICAL EXAM
[FreeTextEntry1] : She is awake alert oriented x3 follows 3-4 step commands crosses the midline well. Her attention and concentration is normal she appears to be in a good mood although is slightly tired.\par She does have a tendency to sit and is semi-scoliotic posture leaning to the left and her left arm. She does have a slight increased muscle bulk on the right paraspinal muscle at midthoracic level.\par The cranial nerve exam is normal\par Motor exam shows a slight atrophy of thenar muscles on both sides perhaps left more than right otherwise the exam is normal\par When she stands up she needs to help herself because of the perhaps left knee pain. She clearly limps on the left side when she walks. Her gait is stable Romberg is negative

## 2020-01-14 NOTE — ASSESSMENT
[FreeTextEntry1] : Georgia is here for having some achiness around the neck and also her back. We have discussed extensively I believe she does have cervical and lumbar mechanical and also radicular disease perhaps in the lumbar region it is more involving left S1 and in the upper extremity C5-C6. We have discussed the ergonomy. decision at this point is to do physical therapy and hopefully hydrotherapy and to lose weight.

## 2020-01-15 ENCOUNTER — OUTPATIENT (OUTPATIENT)
Dept: OUTPATIENT SERVICES | Facility: HOSPITAL | Age: 60
LOS: 1 days | Discharge: HOME | End: 2020-01-15

## 2020-01-15 ENCOUNTER — OUTPATIENT (OUTPATIENT)
Dept: OUTPATIENT SERVICES | Facility: HOSPITAL | Age: 60
LOS: 1 days | Discharge: HOME | End: 2020-01-15
Payer: COMMERCIAL

## 2020-01-15 VITALS
HEIGHT: 64 IN | HEART RATE: 70 BPM | TEMPERATURE: 96 F | SYSTOLIC BLOOD PRESSURE: 134 MMHG | DIASTOLIC BLOOD PRESSURE: 80 MMHG | RESPIRATION RATE: 19 BRPM | OXYGEN SATURATION: 99 % | WEIGHT: 255.74 LBS

## 2020-01-15 DIAGNOSIS — Z98.890 OTHER SPECIFIED POSTPROCEDURAL STATES: Chronic | ICD-10-CM

## 2020-01-15 DIAGNOSIS — Z01.818 ENCOUNTER FOR OTHER PREPROCEDURAL EXAMINATION: ICD-10-CM

## 2020-01-15 DIAGNOSIS — D05.12 INTRADUCTAL CARCINOMA IN SITU OF LEFT BREAST: ICD-10-CM

## 2020-01-15 DIAGNOSIS — Z90.710 ACQUIRED ABSENCE OF BOTH CERVIX AND UTERUS: Chronic | ICD-10-CM

## 2020-01-15 DIAGNOSIS — Z90.49 ACQUIRED ABSENCE OF OTHER SPECIFIED PARTS OF DIGESTIVE TRACT: Chronic | ICD-10-CM

## 2020-01-15 DIAGNOSIS — Z96.652 PRESENCE OF LEFT ARTIFICIAL KNEE JOINT: Chronic | ICD-10-CM

## 2020-01-15 LAB
ALBUMIN SERPL ELPH-MCNC: 4.3 G/DL — SIGNIFICANT CHANGE UP (ref 3.5–5.2)
ALP SERPL-CCNC: 72 U/L — SIGNIFICANT CHANGE UP (ref 30–115)
ALT FLD-CCNC: 15 U/L — SIGNIFICANT CHANGE UP (ref 0–41)
ANION GAP SERPL CALC-SCNC: 16 MMOL/L — HIGH (ref 7–14)
APTT BLD: 37 SEC — SIGNIFICANT CHANGE UP (ref 27–39.2)
AST SERPL-CCNC: 14 U/L — SIGNIFICANT CHANGE UP (ref 0–41)
BASOPHILS # BLD AUTO: 0.03 K/UL — SIGNIFICANT CHANGE UP (ref 0–0.2)
BASOPHILS NFR BLD AUTO: 0.5 % — SIGNIFICANT CHANGE UP (ref 0–1)
BILIRUB SERPL-MCNC: 0.6 MG/DL — SIGNIFICANT CHANGE UP (ref 0.2–1.2)
BUN SERPL-MCNC: 21 MG/DL — HIGH (ref 10–20)
CALCIUM SERPL-MCNC: 8.8 MG/DL — SIGNIFICANT CHANGE UP (ref 8.5–10.1)
CHLORIDE SERPL-SCNC: 102 MMOL/L — SIGNIFICANT CHANGE UP (ref 98–110)
CO2 SERPL-SCNC: 25 MMOL/L — SIGNIFICANT CHANGE UP (ref 17–32)
CREAT SERPL-MCNC: 0.7 MG/DL — SIGNIFICANT CHANGE UP (ref 0.7–1.5)
EOSINOPHIL # BLD AUTO: 0.11 K/UL — SIGNIFICANT CHANGE UP (ref 0–0.7)
EOSINOPHIL NFR BLD AUTO: 1.8 % — SIGNIFICANT CHANGE UP (ref 0–8)
ESTIMATED AVERAGE GLUCOSE: 111 MG/DL — SIGNIFICANT CHANGE UP (ref 68–114)
GLUCOSE SERPL-MCNC: 87 MG/DL — SIGNIFICANT CHANGE UP (ref 70–99)
HBA1C BLD-MCNC: 5.5 % — SIGNIFICANT CHANGE UP (ref 4–5.6)
HCT VFR BLD CALC: 38.2 % — SIGNIFICANT CHANGE UP (ref 37–47)
HGB BLD-MCNC: 11.5 G/DL — LOW (ref 12–16)
IMM GRANULOCYTES NFR BLD AUTO: 1.3 % — HIGH (ref 0.1–0.3)
INR BLD: 0.96 RATIO — SIGNIFICANT CHANGE UP (ref 0.65–1.3)
LYMPHOCYTES # BLD AUTO: 1.28 K/UL — SIGNIFICANT CHANGE UP (ref 1.2–3.4)
LYMPHOCYTES # BLD AUTO: 20.6 % — SIGNIFICANT CHANGE UP (ref 20.5–51.1)
MCHC RBC-ENTMCNC: 20.1 PG — LOW (ref 27–31)
MCHC RBC-ENTMCNC: 30.1 G/DL — LOW (ref 32–37)
MCV RBC AUTO: 66.9 FL — LOW (ref 81–99)
MONOCYTES # BLD AUTO: 0.36 K/UL — SIGNIFICANT CHANGE UP (ref 0.1–0.6)
MONOCYTES NFR BLD AUTO: 5.8 % — SIGNIFICANT CHANGE UP (ref 1.7–9.3)
NEUTROPHILS # BLD AUTO: 4.36 K/UL — SIGNIFICANT CHANGE UP (ref 1.4–6.5)
NEUTROPHILS NFR BLD AUTO: 70 % — SIGNIFICANT CHANGE UP (ref 42.2–75.2)
NRBC # BLD: 0 /100 WBCS — SIGNIFICANT CHANGE UP (ref 0–0)
PLATELET # BLD AUTO: 330 K/UL — SIGNIFICANT CHANGE UP (ref 130–400)
POTASSIUM SERPL-MCNC: 3.8 MMOL/L — SIGNIFICANT CHANGE UP (ref 3.5–5)
POTASSIUM SERPL-SCNC: 3.8 MMOL/L — SIGNIFICANT CHANGE UP (ref 3.5–5)
PROT SERPL-MCNC: 7 G/DL — SIGNIFICANT CHANGE UP (ref 6–8)
PROTHROM AB SERPL-ACNC: 11.1 SEC — SIGNIFICANT CHANGE UP (ref 9.95–12.87)
RBC # BLD: 5.71 M/UL — HIGH (ref 4.2–5.4)
RBC # FLD: 15.9 % — HIGH (ref 11.5–14.5)
SODIUM SERPL-SCNC: 143 MMOL/L — SIGNIFICANT CHANGE UP (ref 135–146)
WBC # BLD: 6.22 K/UL — SIGNIFICANT CHANGE UP (ref 4.8–10.8)
WBC # FLD AUTO: 6.22 K/UL — SIGNIFICANT CHANGE UP (ref 4.8–10.8)

## 2020-01-15 PROCEDURE — 77049 MRI BREAST C-+ W/CAD BI: CPT | Mod: 26

## 2020-01-15 NOTE — H&P PST ADULT - NSICDXPASTMEDICALHX_GEN_ALL_CORE_FT
PAST MEDICAL HISTORY:  Fibromyalgia     GERD (gastroesophageal reflux disease)     H/O melanoma excision     HTN (hypertension)     Hypothyroidism     Irritable bowel disease     Lung nodule     Migraines PAST MEDICAL HISTORY:  Fibromyalgia     GERD (gastroesophageal reflux disease)     H/O melanoma excision     HTN (hypertension)     Hypothyroidism     Irritable bowel disease     Leg edema BLE    Lung nodule     Migraines

## 2020-01-15 NOTE — H&P PST ADULT - VENOUS THROMBOEMBOLISM CURRENT STATUS
(2) malignancy (present or previous) (2) malignancy (present or previous)/(1) swollen legs (current)

## 2020-01-15 NOTE — H&P PST ADULT - HISTORY OF PRESENT ILLNESS
Successful stereotactic core biopsy of microcalcifications in the left breast Patient went for a routine mammogram and it shown abnormal F/U by biopsy of left breast done and it shown microcalcifications in the left breast and ductal carcinoma in situ. Patient denies any c/o cp, sob, palpitations fever, or dysuria. Patient c/o dry cough. Ex tolerance of 1 fos walk with out sob except right knee pains.   No KEL. Patient went for a routine mammogram and it shown abnormal and F/U by a biopsy of left breast done and it shown microcalcifications and ductal carcinoma in situ. Patient denies any c/o cp, sob, palpitations fever, or dysuria. Patient c/o dry cough. Ex tolerance of 1 fos walk with out sob except right knee pains.   No KEL.

## 2020-01-15 NOTE — H&P PST ADULT - REASON FOR ADMISSION
58 yo f presents to PAST for left breast lumpectomy with needle localization of two areas under GA on 1/27/2020 by DR. Larios at [facility] OR.

## 2020-01-15 NOTE — H&P PST ADULT - NSANTHOSAYNRD_GEN_A_CORE
No. KEL screening performed.  STOP BANG Legend: 0-2 = LOW Risk; 3-4 = INTERMEDIATE Risk; 5-8 = HIGH Risk

## 2020-01-22 PROBLEM — R60.0 LOCALIZED EDEMA: Chronic | Status: ACTIVE | Noted: 2020-01-15

## 2020-01-23 NOTE — HISTORY OF PRESENT ILLNESS
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  \par Estrogen receptor positive, 95\par Progesterone receptor positive, 75\par \par Left breast DCIS, micropapillary and solid types with comedo necrosis and calcifications, intermediate nuclear grade on stereotactic core biopsy 12/24/19; LUOQ, Anterior calcifications, Calcifications span 5 CM (tophat). \par Estrogen receptor positive, 95\par Progesterone receptor positive, 75\par \par Pt denies any pain, palpable lumps, nipple discharge or inversion, and/or skin changes.\par \par Personal hx - thyroid cancer (2000); melanoma of left thigh (2017).\par \par (+) family hx - breast cancer:\par maternal aunt (70s)\par maternal first cousin (50s)\par paternal first cousin (50s)\par *No genetic testing performed in the family.\par

## 2020-01-23 NOTE — ASSESSMENT
[FreeTextEntry1] : CORY RECIO is a 59 year old female patient who presents today for newly diagnosed left breast DCIS, solid and cribiform types with comedo necrosis and calcifications, intermediate nuclear grade. \par Calcifications span approximately 5cm.  ER/IN (+).\par She is status post stereotactic core biopsy x 2 areas on 12/24/19. \par She offers no breast related complaints.\par This lesion was discovered on screening mammography.\par \par On physical exam today there are no dominant palpable masses, nipple discharge or inversion, \par skin changes of the breasts bilaterally.\par There is no axillary adenopathy appreciated.\par \par We had a lengthy discussion regarding her diagnosis and treatment options.\par We discussed genetic testing and she defers at this time.\par She will be sent for a bilateral breast MRI with and without contrast for evaluation of extent of disease.\par If no other areas of disease are found, she will be a good candidate for breast conserving therapy with a left needle localized lumpectomy x 2 (bracketed). \par \par I spent a total of 60 minutes of face to face time with this patient, greater than 50% of which was spent in counseling and/or coordination of care.\par All of her questions were appropriately answered.\par She knows to call with any concerns.\par

## 2020-01-23 NOTE — PHYSICAL EXAM
[Normocephalic] : normocephalic [Atraumatic] : atraumatic [No Supraclavicular Adenopathy] : no supraclavicular adenopathy [No Cervical Adenopathy] : no cervical adenopathy [Examined in the supine and seated position] : examined in the supine and seated position [No dominant masses] : no dominant masses in right breast  [No dominant masses] : no dominant masses left breast [No Nipple Discharge] : no left nipple discharge [No Axillary Lymphadenopathy] : no left axillary lymphadenopathy [No Rashes] : no rashes [No Ulceration] : no ulceration [Breast Nipple Inversion] : nipples not inverted [Breast Nipple Retraction] : nipples not retracted

## 2020-01-23 NOTE — PAST MEDICAL HISTORY
[Postmenopausal] : The patient is postmenopausal [Menarche Age ____] : age at menarche was [unfilled] [Menopause Age____] : age at menopause was [unfilled] [Total Preg ___] : G[unfilled] [Live Births ___] : P[unfilled]  [History of Hormone Replacement Treatment] : has no history of hormone replacement treatment [FreeTextEntry5] : TAHBSO - 11/19/19. [FreeTextEntry6] : No. [FreeTextEntry7] : Yes.

## 2020-01-23 NOTE — REASON FOR VISIT
[Initial Evaluation] : an initial evaluation [FreeTextEntry1] : recently diagnosed left breast DCIS.

## 2020-01-23 NOTE — DATA REVIEWED
[FreeTextEntry1] : EXAM: MG MAMMO SCREEN W WINSTON BI# \par PROCEDURE DATE: 12/12/2019 \par INTERPRETATION: HISTORY: \par Bilateral MG MAMMO SCREEN W WINSTON BI# was performed. Patient is 59 years old and is seen for screening. The patient \par has a history of melanoma at age 57 and thyroid cancer at age 37. The patient has a history of right excisional biopsy in \par November, 2014 - benign - Sebaceous cyst removed. The patient has the following family history of breast cancer: \par maternal aunt, at age 60; cousin female, at age 40; cousin female, breast cancer; maternal aunt, breast cancer and cousin \par female, at age 50, breast cancer. \par RISK ASSESSMENT: \par NCI Lifetime Risk: 11.1 \par Tyrer-Cuzick Lifetime Risk: 25.9 \par CLINICAL BREAST EXAM: \par The patient reports her last clinical breast exam was performed 4 months ago. \par COMPARISON STUDIES: \par The present examination has been compared to prior imaging studies performed at [facility] on \par 11/11/2016, 11/14/2017 and 11/26/2018. \par MAMMOGRAM FINDINGS: \par Mammography was performed including the following views: bilateral craniocaudal with tomosynthesis, bilateral \par mediolateral oblique with tomosynthesis. The examination includes digital synthetic 2D and digital tomosynthesis 3D \par images. Additional imaging analysis was performed using CAD (computer-aided detection) software. \par There are scattered areas of fibroglandular density. \par Finding 1: There are calcifications with regional distribution seen in the left breast. \par Finding 2: There is an apparent mass seen in the inferior of the left breast. \par No suspicious mass, grouping of calcifications, or other abnormality is identified in the right breast. \par IMPRESSION: \par Finding 1: Calcifications in the left breast require additional evaluation. \par RECOMMENDATION: \par Patient will be recalled for additional views.\par Finding 2: Mass in the left breast requires additional evaluation. \par RECOMMENDATION: \par Patient will be recalled for additional mammographic views and, if indicated, breast ultrasound. \par ASSESSMENT: \par BI-RADS Category 0: Incomplete: Needs Additional Imaging Evaluation \par Given the patient's history, she meets the American Cancer Society guidelines for annual screening with breast MRI in \par addition to annual mammography (i.e., lifetime risk greater than 20-25%) \par The patient will be notified of these results by telephone, and will also be mailed a written summary in layman's terms. \par LUPE OMER M.D., ATTENDING RADIOLOGIST \par This document has been electronically signed. Dec 12 2019 11:07AM\par \par \par \par \par \par EXAM: US BREAST LIMITED LT\par EXAM: MG MAMMO DIAG W WINSTON LT#\par PROCEDURE DATE: 12/20/2019\par INTERPRETATION: Clinical History / Reason for exam: Call back for recent\par screening mammogram.\par Additional history: Patient is a call back for calcification and an apparent\par mass within the left breast.\par The patient reports her last clinical breast examination was performed 4\par months prior.\par Family history: Maternal aunt at the age of 60. Female cousins.\par Comparisons: Multiple mammograms dating back to 11/10/2015.\par Views obtained: Tomographic true lateral and spot compression/magnification\par views of the left breast.\par Computer-aided detection was utilized in the interpretation of this\par examination.\par Breast composition:There are scattered areas of fibroglandular density.\par Findings:\par Mammogram:\par Segmental calcifications in the upper outer quadrant of the left breast are\par amorphous and indeterminate in spanning over approximately 5 cm.\par The previously noted apparent mass persists on spot compression views and\par corresponds with a benign cyst seen on concurrent ultrasound.\par Ultrasound:\par Targeted unilateral left breast ultrasound was performed.\par In the left breast, at the 4:00 axis, 6 to 7 cm from the nipple, there is a\par 0.4 x 0.3 x 0.3 cm cyst which corresponds with mass seen on concurrent\par mammogram..\par Impression:\par 1. Indeterminate calcifications within the upper outer quadrant of the left\par breast are indeterminate and stereotactic guided biopsy is recommended.\par 2. Benign left breast cyst corresponding to the queried mass seen on\par mammogram.\par Recommendation: Stereotactic guided biopsy. Stereotactic guided biopsy of\par the anterior and posterior aspects of the left breast calcifications is\par recommended.\par BI-RADS Category 4: Suspicious\par The above findings and recommendations were discussed with the patient at\par the time of the examination.\par Dr. Cordell Kee discussed findings with ANATOLIY LORENZO DO on\par 12/20/2019 10:15 AM with readback.\par CORDELL KEE M.D., RESIDENT RADIOLOGIST\par This document has been electronically signed.\par NOMAN HARDIN M.D., ATTENDING RADIOLOGIST\par This document has been electronically signed. Dec 20 2019 5:07PM\par \par \par \par \par EXAM: MG STEREO BX 1ST LT SISC\par *** ADDENDUM 12/30/2019 ***\par 1. BREAST, LEFT POSTERIOR CALCIFICATIONS, STEREOTACTIC GUIDED\par VACUUM ASSISTED NEEDLE CORE BIOPSIES:\par - DUCTAL CARCINOMA IN-SITU (DCIS), SOLID AND CRIBRIFORM TYPES\par WITH COMEDO NECROSIS AND CALCIFICATIONS, INTERMEDIATE NUCLEAR GRADE.\par 2. BREAST, LEFT ANTERIOR CALCIFICATIONS, STEREOTACTIC GUIDED\par VACUUM ASSISTED NEEDLE CORE BIOPSIES:\par - DUCTAL CARCINOMA IN-SITU (DCIS), MICROPAPILLARY AND SOLID\par TYPES WITH COMEDO NECROSIS AND CALCIFICATIONS, INTERMEDIATE NUCLEAR GRADE.\par - PROLIFERATIVE TYPE FIBROCYSTIC CHANGES ASSOCIATED WITH\par MICROCALCIFICATIONS.\par The above findings are malignant concordant.\par Recommendations: Surgical and oncologic follow-up is recommended.\par The above findings and recommendations were discussed with the patient on\par 12/30/2019.\par *** END OF ADDENDUM 12/30/2019 ***\par PROCEDURE DATE: 12/24/2019

## 2020-01-23 NOTE — CONSULT LETTER
[Dear  ___] : Dear  [unfilled], [Consult Letter:] : I had the pleasure of evaluating your patient, [unfilled]. [Please see my note below.] : Please see my note below. [Consult Closing:] : Thank you very much for allowing me to participate in the care of this patient.  If you have any questions, please do not hesitate to contact me. [Sincerely,] : Sincerely, [FreeTextEntry2] : Josiane Parrish D.O.\par [address]\[place] [address]\par \par Braulio Hill M.D.\par [address]\[place] [address]  [FreeTextEntry3] : Basia Stevens M.D., F.A.C.S.

## 2020-01-24 NOTE — ASU PATIENT PROFILE, ADULT - PMH
Fibromyalgia    GERD (gastroesophageal reflux disease)    H/O melanoma excision    HTN (hypertension)    Hypothyroidism    Irritable bowel disease    Leg edema  BLE  Lung nodule    Migraines

## 2020-01-24 NOTE — ASU PATIENT PROFILE, ADULT - PSH
History of D&C    History of left knee replacement    S/P laparoscopic cholecystectomy    S/P thyroidectomy    S/P total hysterectomy and bilateral salpingo-oophorectomy

## 2020-01-26 ENCOUNTER — FORM ENCOUNTER (OUTPATIENT)
Age: 60
End: 2020-01-26

## 2020-01-27 ENCOUNTER — RESULT REVIEW (OUTPATIENT)
Age: 60
End: 2020-01-27

## 2020-01-27 ENCOUNTER — APPOINTMENT (OUTPATIENT)
Dept: BREAST CENTER | Facility: AMBULATORY SURGERY CENTER | Age: 60
End: 2020-01-27
Payer: COMMERCIAL

## 2020-01-27 ENCOUNTER — OUTPATIENT (OUTPATIENT)
Dept: OUTPATIENT SERVICES | Facility: HOSPITAL | Age: 60
LOS: 1 days | Discharge: HOME | End: 2020-01-27
Payer: COMMERCIAL

## 2020-01-27 VITALS
HEART RATE: 78 BPM | HEIGHT: 64 IN | RESPIRATION RATE: 16 BRPM | DIASTOLIC BLOOD PRESSURE: 86 MMHG | WEIGHT: 255.74 LBS | OXYGEN SATURATION: 99 % | TEMPERATURE: 98 F | SYSTOLIC BLOOD PRESSURE: 182 MMHG

## 2020-01-27 VITALS
TEMPERATURE: 98 F | SYSTOLIC BLOOD PRESSURE: 137 MMHG | DIASTOLIC BLOOD PRESSURE: 63 MMHG | HEART RATE: 60 BPM | OXYGEN SATURATION: 98 % | RESPIRATION RATE: 18 BRPM

## 2020-01-27 DIAGNOSIS — K21.9 GASTRO-ESOPHAGEAL REFLUX DISEASE WITHOUT ESOPHAGITIS: ICD-10-CM

## 2020-01-27 DIAGNOSIS — Z90.710 ACQUIRED ABSENCE OF BOTH CERVIX AND UTERUS: Chronic | ICD-10-CM

## 2020-01-27 DIAGNOSIS — Z96.652 PRESENCE OF LEFT ARTIFICIAL KNEE JOINT: Chronic | ICD-10-CM

## 2020-01-27 DIAGNOSIS — Z85.850 PERSONAL HISTORY OF MALIGNANT NEOPLASM OF THYROID: ICD-10-CM

## 2020-01-27 DIAGNOSIS — I10 ESSENTIAL (PRIMARY) HYPERTENSION: ICD-10-CM

## 2020-01-27 DIAGNOSIS — Z88.8 ALLERGY STATUS TO OTHER DRUGS, MEDICAMENTS AND BIOLOGICAL SUBSTANCES STATUS: ICD-10-CM

## 2020-01-27 DIAGNOSIS — Z17.0 ESTROGEN RECEPTOR POSITIVE STATUS [ER+]: ICD-10-CM

## 2020-01-27 DIAGNOSIS — Z98.890 OTHER SPECIFIED POSTPROCEDURAL STATES: Chronic | ICD-10-CM

## 2020-01-27 DIAGNOSIS — G47.33 OBSTRUCTIVE SLEEP APNEA (ADULT) (PEDIATRIC): ICD-10-CM

## 2020-01-27 DIAGNOSIS — D05.12 INTRADUCTAL CARCINOMA IN SITU OF LEFT BREAST: ICD-10-CM

## 2020-01-27 DIAGNOSIS — Z90.49 ACQUIRED ABSENCE OF OTHER SPECIFIED PARTS OF DIGESTIVE TRACT: Chronic | ICD-10-CM

## 2020-01-27 DIAGNOSIS — K58.9 IRRITABLE BOWEL SYNDROME WITHOUT DIARRHEA: ICD-10-CM

## 2020-01-27 DIAGNOSIS — M79.7 FIBROMYALGIA: ICD-10-CM

## 2020-01-27 DIAGNOSIS — Z91.040 LATEX ALLERGY STATUS: ICD-10-CM

## 2020-01-27 DIAGNOSIS — Z79.82 LONG TERM (CURRENT) USE OF ASPIRIN: ICD-10-CM

## 2020-01-27 DIAGNOSIS — D24.2 BENIGN NEOPLASM OF LEFT BREAST: ICD-10-CM

## 2020-01-27 DIAGNOSIS — N60.92 UNSPECIFIED BENIGN MAMMARY DYSPLASIA OF LEFT BREAST: ICD-10-CM

## 2020-01-27 DIAGNOSIS — Z85.820 PERSONAL HISTORY OF MALIGNANT MELANOMA OF SKIN: ICD-10-CM

## 2020-01-27 PROCEDURE — 19282 PERQ DEVICE BREAST EA IMAG: CPT | Mod: LT

## 2020-01-27 PROCEDURE — 19301 PARTIAL MASTECTOMY: CPT | Mod: LT

## 2020-01-27 PROCEDURE — 88305 TISSUE EXAM BY PATHOLOGIST: CPT | Mod: 26

## 2020-01-27 PROCEDURE — 19281 PERQ DEVICE BREAST 1ST IMAG: CPT | Mod: LT

## 2020-01-27 PROCEDURE — 88341 IMHCHEM/IMCYTCHM EA ADD ANTB: CPT | Mod: 26

## 2020-01-27 PROCEDURE — 88342 IMHCHEM/IMCYTCHM 1ST ANTB: CPT | Mod: 26

## 2020-01-27 RX ORDER — OXYCODONE HYDROCHLORIDE 5 MG/1
5 TABLET ORAL ONCE
Refills: 0 | Status: DISCONTINUED | OUTPATIENT
Start: 2020-01-27 | End: 2020-01-27

## 2020-01-27 RX ORDER — TRAMADOL HYDROCHLORIDE 50 MG/1
50 TABLET, COATED ORAL
Qty: 10 | Refills: 0 | Status: DISCONTINUED | COMMUNITY
Start: 2020-01-27 | End: 2020-01-27

## 2020-01-27 RX ORDER — ONDANSETRON 8 MG/1
4 TABLET, FILM COATED ORAL ONCE
Refills: 0 | Status: DISCONTINUED | OUTPATIENT
Start: 2020-01-27 | End: 2020-02-11

## 2020-01-27 RX ORDER — SODIUM CHLORIDE 9 MG/ML
1000 INJECTION, SOLUTION INTRAVENOUS
Refills: 0 | Status: DISCONTINUED | OUTPATIENT
Start: 2020-01-27 | End: 2020-02-11

## 2020-01-27 RX ORDER — HYDROMORPHONE HYDROCHLORIDE 2 MG/ML
0.5 INJECTION INTRAMUSCULAR; INTRAVENOUS; SUBCUTANEOUS
Refills: 0 | Status: DISCONTINUED | OUTPATIENT
Start: 2020-01-27 | End: 2020-01-27

## 2020-01-27 RX ADMIN — SODIUM CHLORIDE 100 MILLILITER(S): 9 INJECTION, SOLUTION INTRAVENOUS at 14:01

## 2020-01-27 NOTE — BRIEF OPERATIVE NOTE - OPERATION/FINDINGS
left breast needle localized mass, left breast lateral, inferior, superior, anterior and posterior margins.

## 2020-01-27 NOTE — CHART NOTE - NSCHARTNOTEFT_GEN_A_CORE
PACU ANESTHESIA ADMISSION NOTE      Procedure: Lumpectomy of left breast after needle localization: bracketed    Post op diagnosis:  Ductal carcinoma in situ of left breast      ____  Intubated  TV:______       Rate: ______      FiO2: ______    _x___  Patent Airway    _x___  Full return of protective reflexes    _x___  Full recovery from anesthesia / back to baseline status    Vitals:            T: 98.5               BP : 133/60               R: 18             Sat: 97              P:84      Mental Status:  _x___ Awake   _____ Alert   _____ Drowsy   _____ Sedated    Nausea/Vomiting:  _x___  NO       ______Yes,   See Post - Op Orders         Pain Scale (0-10):  __0___    Treatment: _x___ None    ____ See Post - Op/PCA Orders    Post - Operative Fluids:   __x__ Oral   ____ See Post - Op Orders    Plan: Discharge:   _x___Home       _____Floor     _____Critical Care    _____  Other:_________________    Comments:  No anesthesia issues or complications noted.  Discharge when criteria met.

## 2020-01-27 NOTE — ASU DISCHARGE PLAN (ADULT/PEDIATRIC) - PROVIDER TOKENS
PROVIDER:[TOKEN:[[id number]],SCHEDULEDAPPT:[02/04/2020],SCHEDULEDAPPTTIME:[01:15 PM],ESTABLISHEDPATIENT:[T]]

## 2020-01-27 NOTE — BRIEF OPERATIVE NOTE - NSICDXBRIEFPROCEDURE_GEN_ALL_CORE_FT
PROCEDURES:  Lumpectomy of left breast after needle localization 27-Jan-2020 13:40:30 bracketed Teresa Charles

## 2020-01-27 NOTE — ASU DISCHARGE PLAN (ADULT/PEDIATRIC) - CARE PROVIDER_API CALL
Basia Stevens)  Surgery  [address] [place] [address], [address] [place] [address]  Phone: [phone]  Fax: [phone]  Established Patient  Scheduled Appointment: 02/04/2020 01:15 PM

## 2020-02-04 ENCOUNTER — APPOINTMENT (OUTPATIENT)
Dept: BREAST CENTER | Facility: CLINIC | Age: 60
End: 2020-02-04
Payer: COMMERCIAL

## 2020-02-04 VITALS
DIASTOLIC BLOOD PRESSURE: 78 MMHG | SYSTOLIC BLOOD PRESSURE: 124 MMHG | BODY MASS INDEX: 41.15 KG/M2 | TEMPERATURE: 97.8 F | WEIGHT: 247 LBS | HEIGHT: 65 IN

## 2020-02-04 PROCEDURE — 99024 POSTOP FOLLOW-UP VISIT: CPT

## 2020-02-05 LAB — SURGICAL PATHOLOGY STUDY: SIGNIFICANT CHANGE UP

## 2020-02-11 ENCOUNTER — APPOINTMENT (OUTPATIENT)
Dept: BREAST CENTER | Facility: CLINIC | Age: 60
End: 2020-02-11
Payer: COMMERCIAL

## 2020-02-11 VITALS
HEIGHT: 65 IN | TEMPERATURE: 98.4 F | DIASTOLIC BLOOD PRESSURE: 88 MMHG | WEIGHT: 247 LBS | SYSTOLIC BLOOD PRESSURE: 130 MMHG | BODY MASS INDEX: 41.15 KG/M2

## 2020-02-11 PROCEDURE — 99024 POSTOP FOLLOW-UP VISIT: CPT

## 2020-02-12 ENCOUNTER — OUTPATIENT (OUTPATIENT)
Dept: OUTPATIENT SERVICES | Facility: HOSPITAL | Age: 60
LOS: 1 days | Discharge: HOME | End: 2020-02-12

## 2020-02-12 ENCOUNTER — APPOINTMENT (OUTPATIENT)
Dept: ORTHOPEDIC SURGERY | Facility: CLINIC | Age: 60
End: 2020-02-12
Payer: OTHER MISCELLANEOUS

## 2020-02-12 ENCOUNTER — APPOINTMENT (OUTPATIENT)
Dept: HEMATOLOGY ONCOLOGY | Facility: CLINIC | Age: 60
End: 2020-02-12
Payer: COMMERCIAL

## 2020-02-12 VITALS
SYSTOLIC BLOOD PRESSURE: 152 MMHG | WEIGHT: 260 LBS | HEIGHT: 64 IN | TEMPERATURE: 96.6 F | DIASTOLIC BLOOD PRESSURE: 75 MMHG | BODY MASS INDEX: 44.39 KG/M2 | HEART RATE: 79 BPM

## 2020-02-12 DIAGNOSIS — Z96.652 PRESENCE OF LEFT ARTIFICIAL KNEE JOINT: Chronic | ICD-10-CM

## 2020-02-12 DIAGNOSIS — Z98.890 OTHER SPECIFIED POSTPROCEDURAL STATES: Chronic | ICD-10-CM

## 2020-02-12 DIAGNOSIS — Z90.49 ACQUIRED ABSENCE OF OTHER SPECIFIED PARTS OF DIGESTIVE TRACT: Chronic | ICD-10-CM

## 2020-02-12 DIAGNOSIS — Z90.710 ACQUIRED ABSENCE OF BOTH CERVIX AND UTERUS: Chronic | ICD-10-CM

## 2020-02-12 PROCEDURE — 99213 OFFICE O/P EST LOW 20 MIN: CPT

## 2020-02-12 PROCEDURE — 99205 OFFICE O/P NEW HI 60 MIN: CPT

## 2020-02-12 NOTE — ASSESSMENT
[FreeTextEntry1] : Pt present today for an unscheduled visit.\par She is approximately two weeks post-op and is concerned regarding swelling in the left breast.\par \par Physical exam today shows incision healing well, there is a postoperative seroma present but no sign of infection.\par \par CORY will be going on a cruise vacation next week.  She will be given a prescription of antibiotics to take with her should any signs of infection develop.\par Otherwise, she is to follow-up as scheduled. \par \par I spent a total of 15 minutes of face to face time with this patient, greater than 50% of which was spent in counseling and/or coordination of care.\par All of her questions were appropriately answered.\par She knows to call with any concerns.

## 2020-02-12 NOTE — REVIEW OF SYSTEMS
[Breast Swelling] : breast swelling [Negative] : Constitutional [Fever] : no fever [Chills] : no chills [Breast Pain] : no breast pain [Breast Lump] : no breast lump [Breast Reddening] : no reddening of the breast [Breast Warmth] : no breast warmth

## 2020-02-12 NOTE — HISTORY OF PRESENT ILLNESS
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  \par Estrogen receptor positive, 95\par Progesterone receptor positive, 75\par \par Left breast DCIS, micropapillary and solid types with comedo necrosis and calcifications, intermediate nuclear grade on stereotactic core biopsy 12/24/19; LUOQ, Anterior calcifications, Calcifications span 5 CM (tophat). \par Estrogen receptor positive, 95\par Progesterone receptor positive, 75\par \par No prior complaints related to the breasts. \par Her family history is significant for her maternal aunt with breast cancer in her 70's; her maternal first cousin with breast cancer in her 50's; her paternal first cousin with breast cancer in her 50's. 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  No genetic testing in the patient or her family.  \par \par Status post Left NLOC 1/27/20.  \par \par Pt present today for an unscheduled visit.\par She is approximately two weeks post-op and is concerned regarding swelling in the left breast.

## 2020-02-14 NOTE — ASSESSMENT
[FreeTextEntry1] : A/P \par Painful LTKA done at \[facility]"" 2 years ago\par History of chronic low back pain with LLE radiculopathy\par Possible peroneal nerve palsy\par Painful trigger points around knee\par \par Trigger points were injected as described above\par Enjoy vacation\par F/U after vacation with results of MARS MRI and EMG study - given referral for a PM&R who may accept her insurance\par \par All medical record entries made by the PA/Shelby/Fellow are at my, Dr. Velasquez Chatman's direction and personally dictated by me on 02/12/2020]. I have reviewed the chart and agree that the record accurately reflects my personal performance of the history, physical exam, assessment, and plan. I have also personally directed reviewed, and agreed with the chart.\par

## 2020-02-14 NOTE — PROCEDURE
[de-identified] : After obtaining verbal consent and under the normal sterile conditions the four trigger points were injected, each with 2cc's 1% lidocaine and 0.5cc's 40mg/mL kenalog.

## 2020-02-14 NOTE — HISTORY OF PRESENT ILLNESS
[de-identified] : Follow up for her painful LTKA done at [facility], the trigger point injections done last fall worked wonderfully, but have worn off. She has 4 areas marked she would like injected again. [employer] did finally approve her knee MRI and EMG study.

## 2020-02-14 NOTE — PHYSICAL EXAM
[de-identified] : Left Knee: skin is intact, no erythema, surgical scar is well healed on anterior knee. no joint effusion. + medial joint line tenderness, PCL is intact with posterior drawer, negative Lachman, no collateral ligamentous laxity with stress. ROM is 0-100 degrees with no pain. DF/PF/EHL intact with 5/5 strength. 2+ DP pulse. SILT L3-S1. \par \par 4 Tender trigger points identified, 2 medially, one over MFC and one over medial tibal plateau, one at the distal incision, one over LFC.\par

## 2020-02-16 NOTE — CONSULT LETTER
[DrJomar ___] : Dr. ELIZABETH [Dear  ___] : Dear  [unfilled], [Please see my note below.] : Please see my note below. [Consult Letter:] : I had the pleasure of evaluating your patient, [unfilled]. [Sincerely,] : Sincerely, [Consult Closing:] : Thank you very much for allowing me to participate in the care of this patient.  If you have any questions, please do not hesitate to contact me. [FreeTextEntry3] : Solo Joseph MD

## 2020-02-16 NOTE — CONSULT LETTER
[DrJomar ___] : Dr. ELIZABETH [Dear  ___] : Dear  [unfilled], [Please see my note below.] : Please see my note below. [Consult Letter:] : I had the pleasure of evaluating your patient, [unfilled]. [Consult Closing:] : Thank you very much for allowing me to participate in the care of this patient.  If you have any questions, please do not hesitate to contact me. [Sincerely,] : Sincerely, [FreeTextEntry3] : Solo Joseph MD

## 2020-02-18 ENCOUNTER — FORM ENCOUNTER (OUTPATIENT)
Age: 60
End: 2020-02-18

## 2020-02-19 ENCOUNTER — OUTPATIENT (OUTPATIENT)
Dept: OUTPATIENT SERVICES | Facility: HOSPITAL | Age: 60
LOS: 1 days | Discharge: HOME | End: 2020-02-19

## 2020-02-19 ENCOUNTER — APPOINTMENT (OUTPATIENT)
Dept: ORTHOPEDIC SURGERY | Facility: CLINIC | Age: 60
End: 2020-02-19

## 2020-02-19 DIAGNOSIS — Z90.49 ACQUIRED ABSENCE OF OTHER SPECIFIED PARTS OF DIGESTIVE TRACT: Chronic | ICD-10-CM

## 2020-02-19 DIAGNOSIS — Z96.652 PRESENCE OF LEFT ARTIFICIAL KNEE JOINT: Chronic | ICD-10-CM

## 2020-02-19 DIAGNOSIS — Z98.890 OTHER SPECIFIED POSTPROCEDURAL STATES: Chronic | ICD-10-CM

## 2020-02-19 DIAGNOSIS — Z90.710 ACQUIRED ABSENCE OF BOTH CERVIX AND UTERUS: Chronic | ICD-10-CM

## 2020-02-20 ENCOUNTER — APPOINTMENT (OUTPATIENT)
Dept: RADIATION ONCOLOGY | Facility: HOSPITAL | Age: 60
End: 2020-02-20
Payer: COMMERCIAL

## 2020-02-20 VITALS
DIASTOLIC BLOOD PRESSURE: 78 MMHG | HEART RATE: 76 BPM | SYSTOLIC BLOOD PRESSURE: 164 MMHG | RESPIRATION RATE: 16 BRPM | BODY MASS INDEX: 43.6 KG/M2 | WEIGHT: 255.38 LBS | HEIGHT: 64 IN | TEMPERATURE: 96.5 F

## 2020-02-20 DIAGNOSIS — D05.12 INTRADUCTAL CARCINOMA IN SITU OF LEFT BREAST: ICD-10-CM

## 2020-02-20 DIAGNOSIS — Z85.820 PERSONAL HISTORY OF MALIGNANT MELANOMA OF SKIN: ICD-10-CM

## 2020-02-20 PROCEDURE — 99204 OFFICE O/P NEW MOD 45 MIN: CPT

## 2020-02-20 RX ORDER — TRAMADOL HYDROCHLORIDE 50 MG/1
50 TABLET, COATED ORAL
Qty: 15 | Refills: 0 | Status: DISCONTINUED | COMMUNITY
Start: 2020-01-27 | End: 2020-02-20

## 2020-02-20 RX ORDER — ASPIRIN 81 MG
81 TABLET, DELAYED RELEASE (ENTERIC COATED) ORAL
Refills: 0 | Status: DISCONTINUED | COMMUNITY
End: 2020-02-20

## 2020-02-20 RX ORDER — HYDROCHLOROTHIAZIDE 25 MG/1
25 TABLET ORAL
Refills: 0 | Status: ACTIVE | COMMUNITY

## 2020-02-20 RX ORDER — FOLIC ACID 1 MG/1
1 TABLET ORAL
Refills: 0 | Status: ACTIVE | COMMUNITY

## 2020-02-20 RX ORDER — METOPROLOL SUCCINATE 25 MG/1
25 TABLET, EXTENDED RELEASE ORAL
Refills: 0 | Status: ACTIVE | COMMUNITY

## 2020-02-20 NOTE — SOCIAL HISTORY
[Former  Cigarette ___ Pack(s) per day] : former cigarette pack(s) per day:  [unfilled]  [Date (Year) Stopped: _____] : Date (Year) Stopped: [unfilled]  [Former  Cigarette ___ Pack Year(s)] : former pack year(s) of cigarette use: [unfilled]

## 2020-02-25 DIAGNOSIS — N95.9 UNSPECIFIED MENOPAUSAL AND PERIMENOPAUSAL DISORDER: ICD-10-CM

## 2020-02-25 DIAGNOSIS — Z13.820 ENCOUNTER FOR SCREENING FOR OSTEOPOROSIS: ICD-10-CM

## 2020-02-25 DIAGNOSIS — Z82.62 FAMILY HISTORY OF OSTEOPOROSIS: ICD-10-CM

## 2020-03-03 PROCEDURE — 77332 RADIATION TREATMENT AID(S): CPT | Mod: 26

## 2020-03-03 PROCEDURE — 77290 THER RAD SIMULAJ FIELD CPLX: CPT | Mod: 26

## 2020-03-04 PROCEDURE — 77263 THER RADIOLOGY TX PLNG CPLX: CPT

## 2020-03-04 NOTE — HISTORY OF PRESENT ILLNESS
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  \par Estrogen receptor positive, 95\par Progesterone receptor positive, 75\par \par Left breast DCIS, micropapillary and solid types with comedo necrosis and calcifications, intermediate nuclear grade on stereotactic core biopsy 12/24/19; LUOQ, Anterior calcifications, Calcifications span 5 CM (tophat). \par Estrogen receptor positive, 95\par Progesterone receptor positive, 75\par \par No prior complaints related to the breasts. \par Her family history is significan for her maternal aunt with breast cancer in her 70's; her maternal first cousin with breast cancer in her 50's; her paternal first cousin with breast cancer in her 50's. 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  No genetic testing in the patient or her family.  \par \par Status post Left NLOC 1/27/20.  Final results are still pending.

## 2020-03-04 NOTE — PAST MEDICAL HISTORY
[History of Hormone Replacement Treatment] : has no history of hormone replacement treatment [FreeTextEntry5] : TAHBSO - 11/19/19. [FreeTextEntry6] : No. [FreeTextEntry7] : Yes.

## 2020-03-04 NOTE — ASSESSMENT
[FreeTextEntry1] : 59 year old female who presents today for her post operative visit.  She has a history of estrogen receptor positive left breast DCIS solid and cribiform types with comedo necrosis and calcifications of two areas, status post wide local excision with bracketed NLOC 1/27/20.  Her final pathology results indicate that her margins are clear, however, stains are still pending for invasive disease.  She is feeling well, her sutures were removed today, and she has no complaints.  For now, we will call her with her final pathology results.  If no further surgery is indicated, she will follow up here in three months.  We will also refer her to medical oncology and radiation oncology.  All of her questions were appropriately answered.

## 2020-03-04 NOTE — CONSULT LETTER
[Dear  ___] : Dear  [unfilled], [Please see my note below.] : Please see my note below. [Sincerely,] : Sincerely, [FreeTextEntry2] : Josiane Parrish D.O.\par [address] [facility] [address]\[place] [address] \par \par Braulio Hill M.D.\par [address] [facility] [facility] [facility] [facility] [address]  [FreeTextEntry1] : This letter is in regard to our mutual patient who underwent breast lumpectomy with needle localization.  Enclosed is a copy of her pathology results.\par \par We will continue to take excellent care of your patient.\par \par Please feel free to contact our office with any questions or concerns.  [FreeTextEntry3] : Basia Stevens M.D., F.A.C.S.

## 2020-03-10 PROCEDURE — 77300 RADIATION THERAPY DOSE PLAN: CPT | Mod: 26

## 2020-03-10 PROCEDURE — 77334 RADIATION TREATMENT AID(S): CPT | Mod: 26

## 2020-03-10 PROCEDURE — 77295 3-D RADIOTHERAPY PLAN: CPT | Mod: 26

## 2020-03-13 PROCEDURE — 77280 THER RAD SIMULAJ FIELD SMPL: CPT | Mod: 26

## 2020-03-18 VITALS
TEMPERATURE: 96.7 F | BODY MASS INDEX: 44.41 KG/M2 | HEIGHT: 64 IN | SYSTOLIC BLOOD PRESSURE: 190 MMHG | WEIGHT: 260.13 LBS | DIASTOLIC BLOOD PRESSURE: 89 MMHG | HEART RATE: 72 BPM | RESPIRATION RATE: 16 BRPM

## 2020-03-19 NOTE — PHYSICAL EXAM
[Normal] : oriented to person, place and time, the affect was normal, the mood was normal and not anxious [de-identified] : No skin reaction

## 2020-03-19 NOTE — VITALS
[Maximal Pain Intensity: 2/10] : 2/10 [Least Pain Intensity: 0/10] : 0/10 [Pain Location: ___] : Pain Location: [unfilled] [90: Able to carry normal activity; minor signs or symptoms of disease.] : 90: Able to carry normal activity; minor signs or symptoms of disease.

## 2020-03-19 NOTE — DISEASE MANAGEMENT
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   [TTNM] : is [NTNM] : 0 [MTNM] : 0 [de-identified] : 421dGy [de-identified] : 5240cGy [de-identified] : Left breast

## 2020-03-19 NOTE — HISTORY OF PRESENT ILLNESS
[FreeTextEntry1] : 3/16/20 Nursing: Patient c/o intermittent pain 2/10 on numeric scale left chest. Skin care reviewed, patient moisturizing daily with aquaphor. No erythema skin dry and intact. \par \par MD Note:  She is doing well.  No new concerns.  Just started

## 2020-03-23 VITALS
BODY MASS INDEX: 44.22 KG/M2 | WEIGHT: 259 LBS | RESPIRATION RATE: 16 BRPM | DIASTOLIC BLOOD PRESSURE: 78 MMHG | HEART RATE: 71 BPM | SYSTOLIC BLOOD PRESSURE: 150 MMHG | HEIGHT: 64 IN | TEMPERATURE: 97.2 F

## 2020-03-23 NOTE — DISEASE MANAGEMENT
[FreeTextEntry4] : Intermediate grade DCIS of the left breast, ER/SD positive.   [TTNM] : is [NTNM] : 0 [MTNM] : 0 [de-identified] : 3378rGy [de-identified] : 5240cGy [de-identified] : Left breast

## 2020-03-23 NOTE — HISTORY OF PRESENT ILLNESS
[FreeTextEntry1] : 3/23/2020: No c/o pain. Patient states she had mild pain at incision site over weekend which has resolved. Slight erythema left breast, skin dry and intact, skin care reviewed, patient moisturizing daily with aquaphor.\par \par MD Note: She is doing well.  Mild breast discomfort.  No new concerns

## 2020-03-23 NOTE — PHYSICAL EXAM
[] : no respiratory distress [Normal] : oriented to person, place and time, the affect was normal, the mood was normal and not anxious [de-identified] : Mild erythema on left breast.  No desquamation

## 2020-03-26 ENCOUNTER — TRANSCRIPTION ENCOUNTER (OUTPATIENT)
Age: 60
End: 2020-03-26

## 2020-03-26 NOTE — DISCUSSION/SUMMARY
[de-identified] : The patient was counseled once again regarding overall plan for knee pain.\par \par With regards to the right knee, she's had a good response to initial Visco supplementation  injection. Repeat injection was provided today.  She will followup in 2-3 weeks  for her third injection.\par \par With regards to left knee, patient will repeat her ESR and CRP next week. If these tests are positive would recommend aspiration. If negative, consider referral to a joint arthroplasty specialists for further treatment. No

## 2020-03-30 VITALS
WEIGHT: 259 LBS | TEMPERATURE: 97.6 F | BODY MASS INDEX: 44.46 KG/M2 | DIASTOLIC BLOOD PRESSURE: 72 MMHG | RESPIRATION RATE: 16 BRPM | HEART RATE: 74 BPM | SYSTOLIC BLOOD PRESSURE: 139 MMHG

## 2020-03-30 NOTE — HISTORY OF PRESENT ILLNESS
[FreeTextEntry1] : 3/30/20 Nursing: Left breast faint erythema noted. Applying Eucerin after treatment. Has mild breast tenderness. \par \par MD Note: she is doing well.  Mild breast discomfort.  No new concerns.

## 2020-03-30 NOTE — PHYSICAL EXAM
[Normal] : well developed, well nourished, in no acute distress [] : no respiratory distress [de-identified] : Mild to moderate erythema on treated breast.  No desquamation.

## 2020-03-30 NOTE — DISEASE MANAGEMENT
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   [TTNM] : is [NTNM] : 0 [MTNM] : 0 [de-identified] : 1600xDe [de-identified] : 5240cGy [de-identified] : Left breast

## 2020-04-06 VITALS
SYSTOLIC BLOOD PRESSURE: 143 MMHG | TEMPERATURE: 97.3 F | RESPIRATION RATE: 12 BRPM | HEART RATE: 74 BPM | WEIGHT: 262 LBS | BODY MASS INDEX: 44.97 KG/M2 | DIASTOLIC BLOOD PRESSURE: 73 MMHG

## 2020-04-06 PROCEDURE — 77280 THER RAD SIMULAJ FIELD SMPL: CPT | Mod: 26

## 2020-04-06 RX ORDER — ALPRAZOLAM 0.25 MG/1
0.25 TABLET ORAL
Qty: 90 | Refills: 0 | Status: DISCONTINUED | COMMUNITY
Start: 2017-04-12 | End: 2020-04-06

## 2020-04-06 NOTE — VITALS
[Maximal Pain Intensity: 2/10] : 2/10 [Least Pain Intensity: 0/10] : 0/10 [90: Able to carry normal activity; minor signs or symptoms of disease.] : 90: Able to carry normal activity; minor signs or symptoms of disease.

## 2020-04-06 NOTE — PHYSICAL EXAM
[Normal] : well developed, well nourished, in no acute distress [] : no respiratory distress [de-identified] : mild to moderate erythema on left breast.  No desquamation.

## 2020-04-06 NOTE — HISTORY OF PRESENT ILLNESS
[FreeTextEntry1] : 4/6/20 Nursing: Patient states she is experiencing burning, itchiness, and rash to breast/chest area(in cleavage area) which started 3 days ago.    Showering the area causes her to feel pain at a level of 2.  Applying aquaphor as a moisturizer.  Erythema noted to left breast\par \par MD Note:  She is doing well. She reports some skin irritation in radiation field.  No other concerns.

## 2020-04-06 NOTE — DISEASE MANAGEMENT
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   [TTNM] : is [NTNM] : 0 [MTNM] : 0 [de-identified] : [id number] [de-identified] : 5240cGy [de-identified] : Left breast

## 2020-04-10 ENCOUNTER — OUTPATIENT (OUTPATIENT)
Dept: OUTPATIENT SERVICES | Facility: HOSPITAL | Age: 60
LOS: 1 days | Discharge: HOME | End: 2020-04-10
Payer: COMMERCIAL

## 2020-04-10 DIAGNOSIS — Z96.652 PRESENCE OF LEFT ARTIFICIAL KNEE JOINT: Chronic | ICD-10-CM

## 2020-04-10 DIAGNOSIS — D05.12 INTRADUCTAL CARCINOMA IN SITU OF LEFT BREAST: ICD-10-CM

## 2020-04-10 DIAGNOSIS — Z98.890 OTHER SPECIFIED POSTPROCEDURAL STATES: Chronic | ICD-10-CM

## 2020-04-10 DIAGNOSIS — Z90.710 ACQUIRED ABSENCE OF BOTH CERVIX AND UTERUS: Chronic | ICD-10-CM

## 2020-04-10 DIAGNOSIS — Z90.49 ACQUIRED ABSENCE OF OTHER SPECIFIED PARTS OF DIGESTIVE TRACT: Chronic | ICD-10-CM

## 2020-04-15 ENCOUNTER — APPOINTMENT (OUTPATIENT)
Dept: ORTHOPEDIC SURGERY | Facility: CLINIC | Age: 60
End: 2020-04-15

## 2020-04-17 ENCOUNTER — TRANSCRIPTION ENCOUNTER (OUTPATIENT)
Age: 60
End: 2020-04-17

## 2020-04-25 ENCOUNTER — MESSAGE (OUTPATIENT)
Age: 60
End: 2020-04-25

## 2020-05-04 ENCOUNTER — APPOINTMENT (OUTPATIENT)
Dept: DISASTER EMERGENCY | Facility: HOSPITAL | Age: 60
End: 2020-05-04

## 2020-05-05 LAB
SARS-COV-2 IGG SERPL IA-ACNC: <0.1 INDEX
SARS-COV-2 IGG SERPL QL IA: NEGATIVE

## 2020-05-07 ENCOUNTER — APPOINTMENT (OUTPATIENT)
Dept: RADIATION ONCOLOGY | Facility: HOSPITAL | Age: 60
End: 2020-05-07
Payer: COMMERCIAL

## 2020-05-07 PROCEDURE — 99024 POSTOP FOLLOW-UP VISIT: CPT

## 2020-05-07 NOTE — REVIEW OF SYSTEMS
[Fatigue: Grade 1 - Fatigue relieved by rest] : Fatigue: Grade 1 - Fatigue relieved by rest [Skin Hyperpigmentation: Grade 1 - Hyperpigmentation covering <10% BSA; no psychosocial impact] : Skin Hyperpigmentation: Grade 1 - Hyperpigmentation covering <10% BSA; no psychosocial impact

## 2020-05-07 NOTE — DISEASE MANAGEMENT
[Pathological] : TNM Stage: p [0] : 0 [FreeTextEntry4] : Intermediate grade DCIS of the left breast, ER/AR positive.   [NTNM] : 0 [MTNM] : 0 [TTNM] : is [de-identified] : Left breast

## 2020-05-07 NOTE — LETTER CLOSING
[Consult Closing:] : Thank you for allowing me to participate in the care of this patient.  If you have any questions, please do not hesitate to contact me. [Sincerely yours,] : Sincerely yours, [FreeTextEntry3] : Meliza Oswald M.D. \par \par Electronically proofread and signed by:  Meliza Oswald MD\par Attending, Department of Radiation Medicine\par [facility]\par \par CC: Dr. Joseph

## 2020-05-07 NOTE — HISTORY OF PRESENT ILLNESS
[Other Location: e.g. School (Enter Location, City,State)___] : at [unfilled], at the time of the visit. [Other Location: e.g. Home (Enter Location, City,State)___] : at [unfilled] [Patient] : the patient [Self] : self [FreeTextEntry1] : \par CORY RECIO is evaluated in Telehealth follow up.  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 She is s/p breast conserving surgery.  She received 5240cGy to the left breast from 3/16/2020 through 4/6/2020 without any complications.  In the interim, she has done well.  She notes some occasional discomfort in the left breast.  She has a residual tan on the left breast.  She just started Anastrazole and is tolerating it well.  She has no new concerns.  She is scheduled to see Dr. Joseph and  in the upcoming months.  \par \par Heme/Onc Dr. Joseph\par Surg: Dr. Stevens\par \par Today's tele visit is s/p 1 month radiotherapy.  [FreeTextEntry4] : Mohini Washington

## 2020-05-08 ENCOUNTER — RESULT REVIEW (OUTPATIENT)
Age: 60
End: 2020-05-08

## 2020-05-08 ENCOUNTER — OUTPATIENT (OUTPATIENT)
Dept: OUTPATIENT SERVICES | Facility: HOSPITAL | Age: 60
LOS: 1 days | Discharge: HOME | End: 2020-05-08
Payer: OTHER MISCELLANEOUS

## 2020-05-08 DIAGNOSIS — T84.84XS PAIN DUE TO INTERNAL ORTHOPEDIC PROSTHETIC DEVICES, IMPLANTS AND GRAFTS, SEQUELA: ICD-10-CM

## 2020-05-08 DIAGNOSIS — Z90.49 ACQUIRED ABSENCE OF OTHER SPECIFIED PARTS OF DIGESTIVE TRACT: Chronic | ICD-10-CM

## 2020-05-08 DIAGNOSIS — Z96.652 PRESENCE OF LEFT ARTIFICIAL KNEE JOINT: Chronic | ICD-10-CM

## 2020-05-08 DIAGNOSIS — Z98.890 OTHER SPECIFIED POSTPROCEDURAL STATES: Chronic | ICD-10-CM

## 2020-05-08 DIAGNOSIS — T84.84XA PAIN DUE TO INTERNAL ORTHOPEDIC PROSTHETIC DEVICES, IMPLANTS AND GRAFTS, INITIAL ENCOUNTER: ICD-10-CM

## 2020-05-08 DIAGNOSIS — Z90.710 ACQUIRED ABSENCE OF BOTH CERVIX AND UTERUS: Chronic | ICD-10-CM

## 2020-05-08 PROCEDURE — 73721 MRI JNT OF LWR EXTRE W/O DYE: CPT | Mod: 26,LT

## 2020-05-22 ENCOUNTER — OUTPATIENT (OUTPATIENT)
Dept: OUTPATIENT SERVICES | Facility: HOSPITAL | Age: 60
LOS: 1 days | Discharge: HOME | End: 2020-05-22
Payer: COMMERCIAL

## 2020-05-22 DIAGNOSIS — Z90.710 ACQUIRED ABSENCE OF BOTH CERVIX AND UTERUS: Chronic | ICD-10-CM

## 2020-05-22 DIAGNOSIS — Z98.890 OTHER SPECIFIED POSTPROCEDURAL STATES: Chronic | ICD-10-CM

## 2020-05-22 DIAGNOSIS — Z90.49 ACQUIRED ABSENCE OF OTHER SPECIFIED PARTS OF DIGESTIVE TRACT: Chronic | ICD-10-CM

## 2020-05-22 DIAGNOSIS — R07.9 CHEST PAIN, UNSPECIFIED: ICD-10-CM

## 2020-05-22 DIAGNOSIS — Z96.652 PRESENCE OF LEFT ARTIFICIAL KNEE JOINT: Chronic | ICD-10-CM

## 2020-05-22 PROCEDURE — 78452 HT MUSCLE IMAGE SPECT MULT: CPT | Mod: 26

## 2020-06-01 ENCOUNTER — APPOINTMENT (OUTPATIENT)
Dept: ORTHOPEDIC SURGERY | Facility: CLINIC | Age: 60
End: 2020-06-01
Payer: COMMERCIAL

## 2020-06-01 VITALS — TEMPERATURE: 97.6 F

## 2020-06-01 PROCEDURE — 99213 OFFICE O/P EST LOW 20 MIN: CPT | Mod: 25

## 2020-06-01 PROCEDURE — 20610 DRAIN/INJ JOINT/BURSA W/O US: CPT | Mod: RT

## 2020-06-05 NOTE — PROCEDURE
[de-identified] : After obtaining verbal consent and after clearly explaining the risks, benefits, complications, complications, and alternatives. Under the normal sterile conditions the area was sterile prepped and the left knee was injected with x2 syringes that contains a solution of 1cc of triamcinolone (40 mg/mL) and 4 cc of 1% lidocaine (10 mg/mL)

## 2020-06-05 NOTE — HISTORY OF PRESENT ILLNESS
[de-identified] : 61 yo female here c/o constant, frontal, dull/achy, 8/10 left knee pain since 2018. She reports her pain is worse with sit to stand, standing, and stairs. She has a past medical Hx of Breast CA and completed chemo 04/2020, she has Hx HTN and Thyroid CA.\par \par Current Medication: Advil, Tylenol; LT knee corticosteroid injection given 01/2020; No physical therapy; Uses a cane assist device

## 2020-06-05 NOTE — ASSESSMENT
[FreeTextEntry1] : Assessment/Plan:\par 59 yo female here for routine left knee trigger point injections.\par \par Plan:\par \par 1) Left knee Trigger point injection\par 2) FU in 3 months\par \par All medical record entries made by the PA/Scribe/Fellow are at my, Dr. Velasquez Chatman's direction and personally dictated by me on 06/01/2020]. I have reviewed the chart and agree that the record accurately reflects my personal performance of the history, physical exam, assessment, and plan. I have also personally directed reviewed, and agreed with the chart.\par

## 2020-06-05 NOTE — PHYSICAL EXAM
[de-identified] : Not in acute distress, dressed appropriately, sitting on examination table \par Skin: Warm and dry, normal turgor, no rashes \par Neurological: AOx3, Cranial nerves grossly in tact \par Psych: Mood and affect appropriate \par Focused exam of the Right Knee: Mild swelling; No edema erythema redness or drainage. Diffusly tender to palpation. Non-tender w/ MCL & LCL examination. Alignment: Neutral. ROM: 0-105. Stable. 5/5 Strength. DNVI. Ambulates with cane\par \par Focused exam of the Left Knee: mild swelling; No edema erythema redness or drainage. Diffusly tender to palpation. Non-tender w/ MCL & LCL examination. Alignment: Neutral ROM: 0-110. Stable. 5/5 Strength. DNVI. Ambulates with cane.\par  [de-identified] : MRI of Left knee shows: mild bone marrow edema over medial proximal tibia and possible MCL sprain

## 2020-06-08 ENCOUNTER — LABORATORY RESULT (OUTPATIENT)
Age: 60
End: 2020-06-08

## 2020-06-08 ENCOUNTER — APPOINTMENT (OUTPATIENT)
Dept: RHEUMATOLOGY | Facility: CLINIC | Age: 60
End: 2020-06-08
Payer: COMMERCIAL

## 2020-06-08 VITALS
DIASTOLIC BLOOD PRESSURE: 88 MMHG | HEIGHT: 64 IN | WEIGHT: 256 LBS | SYSTOLIC BLOOD PRESSURE: 132 MMHG | OXYGEN SATURATION: 100 % | HEART RATE: 76 BPM | BODY MASS INDEX: 43.71 KG/M2 | TEMPERATURE: 97.7 F

## 2020-06-08 DIAGNOSIS — R23.4 CHANGES IN SKIN TEXTURE: ICD-10-CM

## 2020-06-08 PROCEDURE — 99204 OFFICE O/P NEW MOD 45 MIN: CPT

## 2020-06-08 NOTE — HISTORY OF PRESENT ILLNESS
[Arthralgias] : arthralgias [Myalgias] : myalgias [FreeTextEntry1] : 60 year old female presenting for evaluation of swelling in her LLE\par \par Unknown etiology of swelling. Had a knee replacement in 2018 and then developed swelling in the anterior calve. Always with severe swelling in her ankle by the end of the day. Has been progressive. Dr. Chatman has been giving her trigger point injections, minimal relief. \par Denies pain, but the swelling causes discomfort. Swells more at the end of the day.\par Has seen a vascular surgeon, had US but over a year ago. \par Has noticed that sugar is a trigger. Lately has been avoiding sugar and meat, drinking lots of water. \par \par History of bilateral knee OA. Only a R TKA. \par  \par History of fibromyalgia. Has constant muscle and joint pain\par Bilateral upper extremity pain - both arms, starts at her shoulder and radiates to her wrist. \par Pain in anterior thighs\par Does not feel weak, just low energy.\par Pain will sometimes keep her up at night. \par \par Recent diagnosis of breast ca s/p lumpectomy on letrizole.\par \par History of RP, fingers turn white. Controlled with warming/gloves.\par \par Never had a VTE\par Never been pregnant\par No fevers\par Not very active\par  \par Has IBD  [Anorexia] : no anorexia [Weight Loss] : no weight loss [Fever] : no fever [Malaise] : no malaise [Chills] : no chills [Fatigue] : no fatigue [Depression] : no depression [Malar Facial Rash] : no malar facial rash [Skin Lesions] : no lesions [Skin Nodules] : no skin nodules [Oral Ulcers] : no oral ulcers [Dry Mouth] : no dry mouth [Cough] : no cough [Shortness of Breath] : no shortness of breath [Chest Pain] : no chest pain [Joint Warmth] : no joint warmth [Joint Deformity] : no joint deformity [Decreased ROM] : no decreased range of motion [Morning Stiffness] : no morning stiffness [Falls] : no falls [Difficulty Standing] : no difficulty standing [Difficulty Walking] : no difficulty walking [Muscle Weakness] : no muscle weakness [Muscle Spasms] : no muscle spasms [Muscle Cramping] : no muscle cramping [Visual Changes] : no visual changes [Eye Pain] : no eye pain [Eye Redness] : no eye redness [Dry Eyes] : no dry eyes

## 2020-06-08 NOTE — ASSESSMENT
[FreeTextEntry1] : 60 year old female presenting for evaluation of swelling in her LLE\par Though she only endorses swelling over the R anterior shin, this appears bilateral on my exam today. \par Also with findings consistent with venous disease, champagne bottle neck sign, varicose veins. \par Check US both doppler and non to r/o clot and check for etiology of swelling. \par No additional findings to support inflammatory conditions. Check labs. \par Also likely diagnosis of fibromyalgia. Recommend diet/exercise to start and consider therapy if needed.

## 2020-06-08 NOTE — PHYSICAL EXAM
[General Appearance - Alert] : alert [General Appearance - Well Nourished] : well nourished [General Appearance - In No Acute Distress] : in no acute distress [General Appearance - Well-Appearing] : healthy appearing [General Appearance - Well Developed] : well developed [Sclera] : the sclera and conjunctiva were normal [Outer Ear] : the ears and nose were normal in appearance [Examination Of The Oral Cavity] : the lips and gums were normal [Neck Appearance] : the appearance of the neck was normal [Nasal Cavity] : the nasal mucosa and septum were normal [Respiration, Rhythm And Depth] : normal respiratory rhythm and effort [Heart Sounds] : normal S1 and S2 [Auscultation Breath Sounds / Voice Sounds] : lungs were clear to auscultation bilaterally [Heart Rate And Rhythm] : heart rate was normal and rhythm regular [FreeTextEntry1] : no synovitis. tenderness over trigger points in anterior chest wall.  [] : no rash [Sensation] : the sensory exam was normal to light touch and pinprick [Motor Exam] : the motor exam was normal [Oriented To Time, Place, And Person] : oriented to person, place, and time

## 2020-06-10 ENCOUNTER — OUTPATIENT (OUTPATIENT)
Dept: OUTPATIENT SERVICES | Facility: HOSPITAL | Age: 60
LOS: 1 days | Discharge: HOME | End: 2020-06-10
Payer: COMMERCIAL

## 2020-06-10 DIAGNOSIS — Z96.652 PRESENCE OF LEFT ARTIFICIAL KNEE JOINT: Chronic | ICD-10-CM

## 2020-06-10 DIAGNOSIS — Z98.890 OTHER SPECIFIED POSTPROCEDURAL STATES: Chronic | ICD-10-CM

## 2020-06-10 DIAGNOSIS — Z90.49 ACQUIRED ABSENCE OF OTHER SPECIFIED PARTS OF DIGESTIVE TRACT: Chronic | ICD-10-CM

## 2020-06-10 DIAGNOSIS — R23.4 CHANGES IN SKIN TEXTURE: ICD-10-CM

## 2020-06-10 DIAGNOSIS — Z90.710 ACQUIRED ABSENCE OF BOTH CERVIX AND UTERUS: Chronic | ICD-10-CM

## 2020-06-10 PROCEDURE — 76882 US LMTD JT/FCL EVL NVASC XTR: CPT | Mod: 26,LT

## 2020-06-10 PROCEDURE — 93970 EXTREMITY STUDY: CPT | Mod: 26

## 2020-06-12 LAB
ALBUMIN SERPL ELPH-MCNC: 4.8 G/DL
ALP BLD-CCNC: 67 U/L
ALT SERPL-CCNC: 19 U/L
ANA SER IF-ACNC: NEGATIVE
ANION GAP SERPL CALC-SCNC: 30 MMOL/L
AST SERPL-CCNC: 31 U/L
BASOPHILS # BLD AUTO: 0.05 K/UL
BASOPHILS NFR BLD AUTO: 0.5 %
BILIRUB SERPL-MCNC: 0.4 MG/DL
BUN SERPL-MCNC: 18 MG/DL
CALCIUM SERPL-MCNC: 9.1 MG/DL
CHLORIDE SERPL-SCNC: 99 MMOL/L
CO2 SERPL-SCNC: 15 MMOL/L
CREAT SERPL-MCNC: 0.74 MG/DL
CRP SERPL-MCNC: 0.42 MG/DL
EOSINOPHIL # BLD AUTO: 0.16 K/UL
EOSINOPHIL NFR BLD AUTO: 1.7 %
ERYTHROCYTE [SEDIMENTATION RATE] IN BLOOD BY WESTERGREN METHOD: 33 MM/HR
FERRITIN SERPL-MCNC: 397 NG/ML
GLUCOSE SERPL-MCNC: 90 MG/DL
HCT VFR BLD CALC: 42.7 %
HGB BLD-MCNC: 12.8 G/DL
IGA SER QL IEP: 229 MG/DL
IGG SER QL IEP: 1233 MG/DL
IGM SER QL IEP: 171 MG/DL
IMM GRANULOCYTES NFR BLD AUTO: 0.5 %
LYMPHOCYTES # BLD AUTO: 1.47 K/UL
LYMPHOCYTES NFR BLD AUTO: 16 %
MAN DIFF?: NORMAL
MCHC RBC-ENTMCNC: 21 PG
MCHC RBC-ENTMCNC: 30 GM/DL
MCV RBC AUTO: 70 FL
MONOCYTES # BLD AUTO: 0.53 K/UL
MONOCYTES NFR BLD AUTO: 5.8 %
MPO AB + PR3 PNL SER: NORMAL
MYELOPEROXIDASE AB SER QL IA: <5 UNITS
MYELOPEROXIDASE CELLS FLD QL: NEGATIVE
NEUTROPHILS # BLD AUTO: 6.91 K/UL
NEUTROPHILS NFR BLD AUTO: 75.5 %
PLATELET # BLD AUTO: 351 K/UL
POTASSIUM SERPL-SCNC: 4.1 MMOL/L
PROT SERPL-MCNC: 7.5 G/DL
PROTEINASE3 AB SER IA-ACNC: <5 UNITS
PROTEINASE3 AB SER-ACNC: NEGATIVE
RBC # BLD: 6.1 M/UL
RBC # FLD: 18.3 %
RHEUMATOID FACT SER QL: <10 IU/ML
SODIUM SERPL-SCNC: 144 MMOL/L
TSH SERPL-ACNC: 1.23 UIU/ML
WBC # FLD AUTO: 9.17 K/UL

## 2020-06-15 ENCOUNTER — APPOINTMENT (OUTPATIENT)
Dept: ORTHOPEDIC SURGERY | Facility: CLINIC | Age: 60
End: 2020-06-15
Payer: COMMERCIAL

## 2020-06-15 PROCEDURE — 99213 OFFICE O/P EST LOW 20 MIN: CPT | Mod: 95

## 2020-06-16 LAB
CCP AB SER IA-ACNC: <8 UNITS
RF+CCP IGG SER-IMP: NEGATIVE

## 2020-06-22 NOTE — HISTORY OF PRESENT ILLNESS
[Home] : at home, [unfilled] , at the time of the visit. [Medical Office: ([facility])___] : at the medical office located in  [Verbal consent obtained from patient] : the patient, [unfilled] [de-identified] : 61 yo female here s/p left knee trigger point injections performed on 06/01/20 here for virtual visit. Verbal Consent was given to proceed with telehealth visit by patient prior to the start of the telehealth visit. She reports left knee felt good until today. She reports intermittent, sharp, 4/10, left knee pain since 1 day with decreased swelling.\par \par She reports Dr. Hahn RA doctor sent her for B/L LE venous doppler US that were negative and she will fu with vascular specialists Dr. Martin\par

## 2020-06-22 NOTE — ASSESSMENT
[FreeTextEntry1] : Assessment/Plan:\par \par 61 yo female c/o left knee pain since 1 day\par \par Plan:\par 1) plan for left knee revision surgery when patient ready\par 2) FU in 6 weeks\par \par All medical record entries made by the PA/Scribe/Fellow are at my, Dr. Velasquez Chatman's direction and personally dictated by me on 06/15/2020]. I have reviewed the chart and agree that the record accurately reflects my personal performance of the history, physical exam, assessment, and plan. I have also personally directed reviewed, and agreed with the chart.\par \par \par CORY will benefit from the proposed procedure, she has failed a conservative treatment plan of supervised physical therapy, anti-inflammatory medications, and activity modification. She continues to have pain impacting her ability to perform ADL's and has a decreasing QoL.  We will schedule this for the earliest mutually convenient time after the appropriate pre-op laboratory tests and clearances are obtained.  All questions were answered and a thorough explanation of RBA were given.\par \par Given the COVID19 pandemic, we have discussed the RBA of proceeding with this case with the patient.\par \par Delay or further delay beyond 4 weeks risks significant patient harm, would prolong the current hospital stay, increase the likelihood of a future hospital admission or make later surgery more complex or risk.  This case is appropriately indicated due to intractable pain, increasing need for opioid medications, failure to respond to an appropriate course of non-operative treatment, patient may begin to experience functional loss of ADLs, neurological deficits, or other potential adverse outcomes by delaying this procedure.\par

## 2020-06-23 ENCOUNTER — APPOINTMENT (OUTPATIENT)
Dept: VASCULAR SURGERY | Facility: CLINIC | Age: 60
End: 2020-06-23
Payer: COMMERCIAL

## 2020-06-23 PROCEDURE — 93970 EXTREMITY STUDY: CPT

## 2020-06-23 PROCEDURE — 99213 OFFICE O/P EST LOW 20 MIN: CPT

## 2020-06-25 NOTE — PHYSICAL EXAM
[Normal Heart Sounds] : normal heart sounds [2+] : left 2+ [Ankle Swelling (On Exam)] : present [Ankle Swelling On The Left] : moderate [Ankle Swelling Bilaterally] : bilaterally  [Varicose Veins Of Lower Extremities] : not present [] : not present [de-identified] : well appearing, obese [de-identified] : multiple varicose veins in the right posterior calf and spider veins throughout both legs.

## 2020-06-25 NOTE — HISTORY OF PRESENT ILLNESS
[FreeTextEntry1] : 60 year old female with PMH of anxiety, fibromyalgia, HTN, breast cancer, obesity, raynauds presenting to the office for bilateral lower extremity edema L>R. She recently saw Dr. Stratton who is planning on doing left knee revision surgery, no date set. She also saw Dr. Hahn for the leg swelling who ruled out DVT and referred her here for further vascular evaluation. She states she has always had swollen legs from the thighs to the ankles. Elevation helps along with compression stockings that she wears during the winter. She has been monitoring her blood pressure which has been good. Sugar intake seems to make the swelling worse.

## 2020-06-25 NOTE — ASSESSMENT
[FreeTextEntry1] : 60 year old female with PMH of anxiety, fibromyalgia, HTN, breast cancer, obesity, raynauds presenting to the office for bilateral lower extremity edema L>R. US negative for venous disease. Recommend conservative management with compression stockings and leg elevation. No intervention needed. FU PRN.  [Arterial/Venous Disease] : arterial/venous disease

## 2020-06-29 ENCOUNTER — APPOINTMENT (OUTPATIENT)
Dept: OBGYN | Facility: CLINIC | Age: 60
End: 2020-06-29

## 2020-07-15 ENCOUNTER — APPOINTMENT (OUTPATIENT)
Dept: ORTHOPEDIC SURGERY | Facility: CLINIC | Age: 60
End: 2020-07-15
Payer: OTHER MISCELLANEOUS

## 2020-07-15 PROCEDURE — 99213 OFFICE O/P EST LOW 20 MIN: CPT

## 2020-07-19 NOTE — PHYSICAL EXAM
[de-identified] : Not in acute distress, dressed appropriately, sitting on examination table \par Skin: Warm and dry, normal turgor, no rashes \par Neurological: AOx3, Cranial nerves grossly in tact \par Psych: Mood and affect appropriate \par Focused exam of the Right Knee: Mild swelling; No edema erythema redness or drainage. Diffusly tender to palpation. Non-tender w/ MCL & LCL examination. Alignment: Neutral. ROM: 0-105. Stable. 5/5 Strength. DNVI. Ambulates with cane\par \par Focused exam of the Left Knee: mild swelling; No edema erythema redness or drainage. Diffusly tender to palpation. Non-tender w/ MCL & LCL examination. Alignment: Neutral ROM: 0-110. Stable. 5/5 Strength. DNVI. Ambulates with cane.\par

## 2020-07-19 NOTE — ASSESSMENT
[FreeTextEntry1] : A\par Unstable LTKA\par RK Arthritis\par P\par Did not perform LK INJ today \par LK Hinged knee brace for stability\par HEP\par F/U 4 months to discuss revision\par \par All medical record entries made by the PA/Scribe/Fellow are at my, Dr. Velasquez Chatman's direction and personally dictated by me on 07/15/2020]. I have reviewed the chart and agree that the record accurately reflects my personal performance of the history, physical exam, assessment, and plan. I have also personally directed reviewed, and agreed with the chart.\par

## 2020-07-19 NOTE — HISTORY OF PRESENT ILLNESS
[de-identified] : Follow up for LK. Had trigger point injections back in Feb that worked great. Knee continues to be unstable and painful. RK is now taking the brunt of it in compensatory reaction. requesting repeat LK inj today.

## 2020-07-27 ENCOUNTER — APPOINTMENT (OUTPATIENT)
Dept: HEMATOLOGY ONCOLOGY | Facility: CLINIC | Age: 60
End: 2020-07-27
Payer: COMMERCIAL

## 2020-07-27 ENCOUNTER — OUTPATIENT (OUTPATIENT)
Dept: OUTPATIENT SERVICES | Facility: HOSPITAL | Age: 60
LOS: 1 days | Discharge: HOME | End: 2020-07-27

## 2020-07-27 VITALS
SYSTOLIC BLOOD PRESSURE: 173 MMHG | DIASTOLIC BLOOD PRESSURE: 79 MMHG | HEART RATE: 76 BPM | TEMPERATURE: 97.8 F | HEIGHT: 64 IN | BODY MASS INDEX: 45.41 KG/M2 | WEIGHT: 266 LBS

## 2020-07-27 DIAGNOSIS — Z96.652 PRESENCE OF LEFT ARTIFICIAL KNEE JOINT: Chronic | ICD-10-CM

## 2020-07-27 DIAGNOSIS — Z98.890 OTHER SPECIFIED POSTPROCEDURAL STATES: Chronic | ICD-10-CM

## 2020-07-27 DIAGNOSIS — Z90.710 ACQUIRED ABSENCE OF BOTH CERVIX AND UTERUS: Chronic | ICD-10-CM

## 2020-07-27 DIAGNOSIS — Z90.49 ACQUIRED ABSENCE OF OTHER SPECIFIED PARTS OF DIGESTIVE TRACT: Chronic | ICD-10-CM

## 2020-07-27 PROCEDURE — 99214 OFFICE O/P EST MOD 30 MIN: CPT

## 2020-07-28 NOTE — ASSESSMENT
[FreeTextEntry1] : 58 yo female has left breast breast DCIS, intermediate nuclear grade, ER/IL positive, s/p lumpectomy with negative margins. \par \par Recommendation:\par -- Continue Anastrozole daily, calcium and vitamin D supplement.\par -- Reviewed bone density report. Encourage regular exercise.\par -- Due for repeat dx mammo and US of the left breast.\par --- Followup with Dr. Stevens and Dr. Oswald as indicated.\par -- Followup with PCP for health maintenance. \par -- RTO for followup in 6 months.

## 2020-07-28 NOTE — HISTORY OF PRESENT ILLNESS
[de-identified] : 60 yo female is referred by Dr. Stevens for consultation of adjuvant endocrine therapy. The patient had a screening mammogram on 12/12/19 which showed mass and calcifications in the left breast requiring additional evaluation. On 12/20/19, left breast dx mammo and US showed segmental calcifications in the UOQ of the left breast spanning over about 5 cm. The previously noted apparent mass persisted on spot compression views and corresponded with a benign cyst seen on concurrent US.\par \par On 12/24/19, stereotactic core biopsy of LUOQ, Posterior and anterior calcifications, Calcifications span 5 CM both revealed DCIS solid and cribriform types with comedo necrosis and calcifications, intermediate nuclear grade. \par Estrogen receptor positive, 95%\par Progesterone receptor positive, 75%\par \par Her family history is significant for her maternal aunt with breast cancer in her 70's; her maternal first cousin with breast cancer in her 50's; her paternal first cousin with breast cancer in her 50's. 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 No genetic testing in the patient or her family. \par \par On 1/27/2020, she underwent Left NLOC. The pathology revealed 2 healing prior biopsy sites with widespread DCIS, micropapillary and comedo types associated with calcifications, intermediated nuclear grade and present in 21/35 slides. The inferiore margin was involved and lateral margin was 1.0 mm. Reexcision of left inferior and lateral margins were negative. \par \par The patient recovered well from surgery. She is here today to discuss adjuvant endocrine therapy.\par \par She had RATLH/BSO for persistent PMB in 11/2019. The pathology revealed benign endometrial polyps, corpus uteri showing intramural leiomyomas and ovaries without significant pathologic change.\par  [de-identified] : 7/27/2020:\par The patient is here for followup visit. She was diagnosed with  left breast breast DCIS, intermediate nuclear grade, ER/GA positive, s/p lumpectomy with negative margins. She received adjuvant WBI with 5240cGy to the left breast from 3/16/2020 through 4/6/2020 without any complications. She has been taking Anastrozole daily since 4/2020 and tolerated well. She had bone density in 2/2020 which was within normal limit. She does not have breast related complains.

## 2020-07-28 NOTE — PHYSICAL EXAM
[Fully active, able to carry on all pre-disease performance without restriction] : Status 0 - Fully active, able to carry on all pre-disease performance without restriction [Normal] : affect appropriate [de-identified] : Status post left breast lumpectomy. Surgical scar is healing well. There is no palpable abnormality.

## 2020-07-28 NOTE — CONSULT LETTER
[Dear  ___] : Dear  [unfilled], [Please see my note below.] : Please see my note below. [Courtesy Letter:] : I had the pleasure of seeing your patient, [unfilled], in my office today. [Sincerely,] : Sincerely, [FreeTextEntry3] : Solo Joseph MD [DrJomar ___] : Dr. ELIZABETH

## 2020-07-28 NOTE — REASON FOR VISIT
[Follow-Up Visit] : a follow-up [Initial Consultation] : an initial consultation [FreeTextEntry2] : left breast DCIS.

## 2020-07-31 ENCOUNTER — NON-APPOINTMENT (OUTPATIENT)
Age: 60
End: 2020-07-31

## 2020-07-31 ENCOUNTER — OUTPATIENT (OUTPATIENT)
Dept: OUTPATIENT SERVICES | Facility: HOSPITAL | Age: 60
LOS: 1 days | Discharge: HOME | End: 2020-07-31
Payer: COMMERCIAL

## 2020-07-31 ENCOUNTER — RESULT REVIEW (OUTPATIENT)
Age: 60
End: 2020-07-31

## 2020-07-31 DIAGNOSIS — Z90.710 ACQUIRED ABSENCE OF BOTH CERVIX AND UTERUS: Chronic | ICD-10-CM

## 2020-07-31 DIAGNOSIS — Z98.890 OTHER SPECIFIED POSTPROCEDURAL STATES: Chronic | ICD-10-CM

## 2020-07-31 DIAGNOSIS — Z96.652 PRESENCE OF LEFT ARTIFICIAL KNEE JOINT: Chronic | ICD-10-CM

## 2020-07-31 DIAGNOSIS — Z90.49 ACQUIRED ABSENCE OF OTHER SPECIFIED PARTS OF DIGESTIVE TRACT: Chronic | ICD-10-CM

## 2020-07-31 DIAGNOSIS — R92.8 OTHER ABNORMAL AND INCONCLUSIVE FINDINGS ON DIAGNOSTIC IMAGING OF BREAST: ICD-10-CM

## 2020-07-31 PROCEDURE — 77065 DX MAMMO INCL CAD UNI: CPT | Mod: 26,LT

## 2020-07-31 PROCEDURE — G0279: CPT | Mod: 26

## 2020-08-03 DIAGNOSIS — Z79.811 LONG TERM (CURRENT) USE OF AROMATASE INHIBITORS: ICD-10-CM

## 2020-08-03 DIAGNOSIS — D05.12 INTRADUCTAL CARCINOMA IN SITU OF LEFT BREAST: ICD-10-CM

## 2020-08-15 ENCOUNTER — TRANSCRIPTION ENCOUNTER (OUTPATIENT)
Age: 60
End: 2020-08-15

## 2020-08-26 ENCOUNTER — APPOINTMENT (OUTPATIENT)
Dept: ORTHOPEDIC SURGERY | Facility: CLINIC | Age: 60
End: 2020-08-26
Payer: OTHER MISCELLANEOUS

## 2020-08-26 DIAGNOSIS — M54.5 LOW BACK PAIN: ICD-10-CM

## 2020-08-26 PROCEDURE — 99213 OFFICE O/P EST LOW 20 MIN: CPT | Mod: 95

## 2020-08-26 NOTE — PHYSICAL EXAM
[Fully active, able to carry on all pre-disease performance without restriction] : Status 0 - Fully active, able to carry on all pre-disease performance without restriction [Normal] : grossly intact [de-identified] : Status post left breast lumpectomy. Surgical scar is healing well. There is no palpable abnormality. [Antalgic] : antalgic [de-identified] : Not in acute distress, dressed appropriately\par Skin: no obvious rashes/lesions \par Neurological: AOx3, Cranial nerves grossly in tact \par Psych: Mood and affect appropriate \par \par Focused exam of the Left Knee: + tenderness, + limited ROM 2/2 pain, + swelling \par \par  [de-identified] : no new imaging today

## 2020-08-26 NOTE — HISTORY OF PRESENT ILLNESS
[Home] : at home, [unfilled] , at the time of the visit. [Medical Office: ([facility])___] : at the medical office located in  [Worsening] : worsening [de-identified] : Patient presents today for telehealth visit for f/u left knee pain. Last seen 7/15/2020. Reports left knee pain worse. Right knee worse due to compensation. Using cane \par for support as needed. Taking tylenol because she does not tolerate NSAIDs due to GI issues. Prescribed brace for left knee at last visit but has been unable to get brace \par yet. Unable to schedule revision surgery until Jan/Feb 2021 \par \par Verbal Consent was given to proceed with telehealth visit by patient prior to the start of the telehealth visit.\par

## 2020-08-26 NOTE — PHYSICAL EXAM
[Fully active, able to carry on all pre-disease performance without restriction] : Status 0 - Fully active, able to carry on all pre-disease performance without restriction [Normal] : affect appropriate [de-identified] : Status post left breast lumpectomy. Surgical scar is healing well. There is no palpable abnormality. [Antalgic] : antalgic [de-identified] : Not in acute distress, dressed appropriately\par Skin: no obvious rashes/lesions \par Neurological: AOx3, Cranial nerves grossly in tact \par Psych: Mood and affect appropriate \par \par Focused exam of the Left Knee: + tenderness, + limited ROM 2/2 pain, + swelling \par \par  [de-identified] : no new imaging today

## 2020-08-26 NOTE — REASON FOR VISIT
[Initial Consultation] : an initial consultation [FreeTextEntry2] : left breast DCIS. [Follow-Up Visit] : a follow-up visit for [Artificial Knee Joint] : artificial knee joint

## 2020-08-26 NOTE — HISTORY OF PRESENT ILLNESS
[Home] : at home, [unfilled] , at the time of the visit. [Medical Office: ([facility])___] : at the medical office located in  [Worsening] : worsening [de-identified] : Patient presents today for telehealth visit for f/u left knee pain. Last seen 7/15/2020. Reports left knee pain worse. Right knee worse due to compensation. Using cane \par for support as needed. Taking tylenol because she does not tolerate NSAIDs due to GI issues. Prescribed brace for left knee at last visit but has been unable to get brace \par yet. Unable to schedule revision surgery until Jan/Feb 2021 \par \par Verbal Consent was given to proceed with telehealth visit by patient prior to the start of the telehealth visit.\par

## 2020-08-26 NOTE — PHYSICAL EXAM
[Fully active, able to carry on all pre-disease performance without restriction] : Status 0 - Fully active, able to carry on all pre-disease performance without restriction [Normal] : grossly intact [de-identified] : Status post left breast lumpectomy. Surgical scar is healing well. There is no palpable abnormality. [Antalgic] : antalgic [de-identified] : Not in acute distress, dressed appropriately\par Skin: no obvious rashes/lesions \par Neurological: AOx3, Cranial nerves grossly in tact \par Psych: Mood and affect appropriate \par \par Focused exam of the Left Knee: + tenderness, + limited ROM 2/2 pain, + swelling \par \par  [de-identified] : no new imaging today

## 2020-08-26 NOTE — HISTORY OF PRESENT ILLNESS
[Home] : at home, [unfilled] , at the time of the visit. [Medical Office: ([facility])___] : at the medical office located in  [de-identified] : Patient presents today for telehealth visit for f/u left knee pain. Last seen 7/15/2020. Reports left knee pain worse. Right knee worse due to compensation. Using cane \par for support as needed. Taking tylenol because she does not tolerate NSAIDs due to GI issues. Prescribed brace for left knee at last visit but has been unable to get brace \par yet. Unable to schedule revision surgery until Jan/Feb 2021 \par \par Verbal Consent was given to proceed with telehealth visit by patient prior to the start of the telehealth visit.\par  [Worsening] : worsening

## 2020-08-26 NOTE — ASSESSMENT
[FreeTextEntry1] : A: left knee pain due to h/o LTKA\par P: ultimate revision surgery- anticipate early 2021\par new Rx for hinged knee braces with DME supply locations given for eyad\par topical diclofenac gel for Left knee in lieu of oral NSAIDs\par f/u end of year to schedule revision surgery

## 2020-08-31 ENCOUNTER — APPOINTMENT (OUTPATIENT)
Dept: OBGYN | Facility: CLINIC | Age: 60
End: 2020-08-31
Payer: COMMERCIAL

## 2020-08-31 ENCOUNTER — OUTPATIENT (OUTPATIENT)
Dept: OUTPATIENT SERVICES | Facility: HOSPITAL | Age: 60
LOS: 1 days | Discharge: HOME | End: 2020-08-31

## 2020-08-31 VITALS
BODY MASS INDEX: 44.22 KG/M2 | WEIGHT: 259 LBS | HEIGHT: 64 IN | SYSTOLIC BLOOD PRESSURE: 128 MMHG | DIASTOLIC BLOOD PRESSURE: 88 MMHG

## 2020-08-31 DIAGNOSIS — Z96.652 PRESENCE OF LEFT ARTIFICIAL KNEE JOINT: Chronic | ICD-10-CM

## 2020-08-31 DIAGNOSIS — Z90.710 ACQUIRED ABSENCE OF BOTH CERVIX AND UTERUS: Chronic | ICD-10-CM

## 2020-08-31 DIAGNOSIS — Z98.890 OTHER SPECIFIED POSTPROCEDURAL STATES: Chronic | ICD-10-CM

## 2020-08-31 DIAGNOSIS — Z90.49 ACQUIRED ABSENCE OF OTHER SPECIFIED PARTS OF DIGESTIVE TRACT: Chronic | ICD-10-CM

## 2020-08-31 PROCEDURE — 99396 PREV VISIT EST AGE 40-64: CPT

## 2020-08-31 NOTE — PHYSICAL EXAM
[Awake] : awake [Acute Distress] : no acute distress [Alert] : alert [Mass] : no breast mass [Nipple Discharge] : no nipple discharge [Axillary LAD] : no axillary lymphadenopathy [Soft] : soft [Tender] : non tender [Oriented x3] : oriented to person, place, and time [Normal] : vagina [No Bleeding] : there was no active vaginal bleeding [Absent] : absent [Adnexa Absent] : absent bilaterally

## 2020-09-02 ENCOUNTER — APPOINTMENT (OUTPATIENT)
Dept: ORTHOPEDIC SURGERY | Facility: CLINIC | Age: 60
End: 2020-09-02

## 2020-09-02 PROBLEM — M54.5 ACUTE LOW BACK PAIN, UNSPECIFIED BACK PAIN LATERALITY, UNSPECIFIED WHETHER SCIATICA PRESENT: Status: ACTIVE | Noted: 2020-09-02

## 2020-09-03 ENCOUNTER — EMERGENCY (EMERGENCY)
Facility: HOSPITAL | Age: 60
LOS: 0 days | Discharge: HOME | End: 2020-09-03
Attending: EMERGENCY MEDICINE | Admitting: EMERGENCY MEDICINE
Payer: COMMERCIAL

## 2020-09-03 VITALS
DIASTOLIC BLOOD PRESSURE: 78 MMHG | OXYGEN SATURATION: 100 % | RESPIRATION RATE: 18 BRPM | HEART RATE: 81 BPM | SYSTOLIC BLOOD PRESSURE: 170 MMHG

## 2020-09-03 VITALS
SYSTOLIC BLOOD PRESSURE: 116 MMHG | DIASTOLIC BLOOD PRESSURE: 65 MMHG | OXYGEN SATURATION: 100 % | RESPIRATION RATE: 18 BRPM | HEART RATE: 82 BPM

## 2020-09-03 DIAGNOSIS — Z98.890 OTHER SPECIFIED POSTPROCEDURAL STATES: Chronic | ICD-10-CM

## 2020-09-03 DIAGNOSIS — I10 ESSENTIAL (PRIMARY) HYPERTENSION: ICD-10-CM

## 2020-09-03 DIAGNOSIS — R07.9 CHEST PAIN, UNSPECIFIED: ICD-10-CM

## 2020-09-03 DIAGNOSIS — R07.89 OTHER CHEST PAIN: ICD-10-CM

## 2020-09-03 DIAGNOSIS — Z96.652 PRESENCE OF LEFT ARTIFICIAL KNEE JOINT: ICD-10-CM

## 2020-09-03 DIAGNOSIS — Z98.890 OTHER SPECIFIED POSTPROCEDURAL STATES: ICD-10-CM

## 2020-09-03 DIAGNOSIS — Z90.49 ACQUIRED ABSENCE OF OTHER SPECIFIED PARTS OF DIGESTIVE TRACT: Chronic | ICD-10-CM

## 2020-09-03 DIAGNOSIS — N64.4 MASTODYNIA: ICD-10-CM

## 2020-09-03 DIAGNOSIS — L53.9 ERYTHEMATOUS CONDITION, UNSPECIFIED: ICD-10-CM

## 2020-09-03 DIAGNOSIS — Z79.82 LONG TERM (CURRENT) USE OF ASPIRIN: ICD-10-CM

## 2020-09-03 DIAGNOSIS — Z90.710 ACQUIRED ABSENCE OF BOTH CERVIX AND UTERUS: Chronic | ICD-10-CM

## 2020-09-03 DIAGNOSIS — Z87.891 PERSONAL HISTORY OF NICOTINE DEPENDENCE: ICD-10-CM

## 2020-09-03 DIAGNOSIS — E89.0 POSTPROCEDURAL HYPOTHYROIDISM: ICD-10-CM

## 2020-09-03 DIAGNOSIS — K21.9 GASTRO-ESOPHAGEAL REFLUX DISEASE WITHOUT ESOPHAGITIS: ICD-10-CM

## 2020-09-03 DIAGNOSIS — Z90.710 ACQUIRED ABSENCE OF BOTH CERVIX AND UTERUS: ICD-10-CM

## 2020-09-03 DIAGNOSIS — Z90.49 ACQUIRED ABSENCE OF OTHER SPECIFIED PARTS OF DIGESTIVE TRACT: ICD-10-CM

## 2020-09-03 DIAGNOSIS — Z96.652 PRESENCE OF LEFT ARTIFICIAL KNEE JOINT: Chronic | ICD-10-CM

## 2020-09-03 LAB
ALBUMIN SERPL ELPH-MCNC: 4.5 G/DL — SIGNIFICANT CHANGE UP (ref 3.5–5.2)
ALP SERPL-CCNC: 65 U/L — SIGNIFICANT CHANGE UP (ref 30–115)
ALT FLD-CCNC: 28 U/L — SIGNIFICANT CHANGE UP (ref 0–41)
ANION GAP SERPL CALC-SCNC: 13 MMOL/L — SIGNIFICANT CHANGE UP (ref 7–14)
AST SERPL-CCNC: 24 U/L — SIGNIFICANT CHANGE UP (ref 0–41)
BASOPHILS # BLD AUTO: 0.05 K/UL — SIGNIFICANT CHANGE UP (ref 0–0.2)
BASOPHILS NFR BLD AUTO: 0.7 % — SIGNIFICANT CHANGE UP (ref 0–1)
BILIRUB SERPL-MCNC: 0.7 MG/DL — SIGNIFICANT CHANGE UP (ref 0.2–1.2)
BUN SERPL-MCNC: 15 MG/DL — SIGNIFICANT CHANGE UP (ref 10–20)
CALCIUM SERPL-MCNC: 9.2 MG/DL — SIGNIFICANT CHANGE UP (ref 8.5–10.1)
CHLORIDE SERPL-SCNC: 102 MMOL/L — SIGNIFICANT CHANGE UP (ref 98–110)
CO2 SERPL-SCNC: 27 MMOL/L — SIGNIFICANT CHANGE UP (ref 17–32)
CREAT SERPL-MCNC: 0.7 MG/DL — SIGNIFICANT CHANGE UP (ref 0.7–1.5)
EOSINOPHIL # BLD AUTO: 0.09 K/UL — SIGNIFICANT CHANGE UP (ref 0–0.7)
EOSINOPHIL NFR BLD AUTO: 1.3 % — SIGNIFICANT CHANGE UP (ref 0–8)
GLUCOSE SERPL-MCNC: 100 MG/DL — HIGH (ref 70–99)
HCT VFR BLD CALC: 37.9 % — SIGNIFICANT CHANGE UP (ref 37–47)
HGB BLD-MCNC: 11.9 G/DL — LOW (ref 12–16)
IMM GRANULOCYTES NFR BLD AUTO: 0.4 % — HIGH (ref 0.1–0.3)
LIDOCAIN IGE QN: 35 U/L — SIGNIFICANT CHANGE UP (ref 7–60)
LYMPHOCYTES # BLD AUTO: 1.08 K/UL — LOW (ref 1.2–3.4)
LYMPHOCYTES # BLD AUTO: 16.2 % — LOW (ref 20.5–51.1)
MCHC RBC-ENTMCNC: 20.6 PG — LOW (ref 27–31)
MCHC RBC-ENTMCNC: 31.4 G/DL — LOW (ref 32–37)
MCV RBC AUTO: 65.6 FL — LOW (ref 81–99)
MONOCYTES # BLD AUTO: 0.44 K/UL — SIGNIFICANT CHANGE UP (ref 0.1–0.6)
MONOCYTES NFR BLD AUTO: 6.6 % — SIGNIFICANT CHANGE UP (ref 1.7–9.3)
NEUTROPHILS # BLD AUTO: 4.98 K/UL — SIGNIFICANT CHANGE UP (ref 1.4–6.5)
NEUTROPHILS NFR BLD AUTO: 74.8 % — SIGNIFICANT CHANGE UP (ref 42.2–75.2)
NRBC # BLD: 0 /100 WBCS — SIGNIFICANT CHANGE UP (ref 0–0)
PLATELET # BLD AUTO: 271 K/UL — SIGNIFICANT CHANGE UP (ref 130–400)
POTASSIUM SERPL-MCNC: 3.3 MMOL/L — LOW (ref 3.5–5)
POTASSIUM SERPL-SCNC: 3.3 MMOL/L — LOW (ref 3.5–5)
PROT SERPL-MCNC: 7 G/DL — SIGNIFICANT CHANGE UP (ref 6–8)
RBC # BLD: 5.78 M/UL — HIGH (ref 4.2–5.4)
RBC # FLD: 17.4 % — HIGH (ref 11.5–14.5)
SODIUM SERPL-SCNC: 142 MMOL/L — SIGNIFICANT CHANGE UP (ref 135–146)
TROPONIN T SERPL-MCNC: <0.01 NG/ML — SIGNIFICANT CHANGE UP
WBC # BLD: 6.67 K/UL — SIGNIFICANT CHANGE UP (ref 4.8–10.8)
WBC # FLD AUTO: 6.67 K/UL — SIGNIFICANT CHANGE UP (ref 4.8–10.8)

## 2020-09-03 PROCEDURE — 71046 X-RAY EXAM CHEST 2 VIEWS: CPT | Mod: 26

## 2020-09-03 PROCEDURE — 75574 CT ANGIO HRT W/3D IMAGE: CPT | Mod: 26

## 2020-09-03 PROCEDURE — 93010 ELECTROCARDIOGRAM REPORT: CPT

## 2020-09-03 PROCEDURE — 99285 EMERGENCY DEPT VISIT HI MDM: CPT

## 2020-09-03 RX ORDER — METOPROLOL TARTRATE 50 MG
10 TABLET ORAL ONCE
Refills: 0 | Status: COMPLETED | OUTPATIENT
Start: 2020-09-03 | End: 2020-09-03

## 2020-09-03 RX ORDER — ASPIRIN/CALCIUM CARB/MAGNESIUM 324 MG
325 TABLET ORAL ONCE
Refills: 0 | Status: COMPLETED | OUTPATIENT
Start: 2020-09-03 | End: 2020-09-03

## 2020-09-03 RX ORDER — METOPROLOL TARTRATE 50 MG
50 TABLET ORAL ONCE
Refills: 0 | Status: COMPLETED | OUTPATIENT
Start: 2020-09-03 | End: 2020-09-03

## 2020-09-03 RX ADMIN — Medication 50 MILLIGRAM(S): at 11:56

## 2020-09-03 RX ADMIN — Medication 50 MILLIGRAM(S): at 13:02

## 2020-09-03 RX ADMIN — Medication 325 MILLIGRAM(S): at 11:56

## 2020-09-03 NOTE — ED PROVIDER NOTE - ATTENDING CONTRIBUTION TO CARE
61 yo f with pmh of stage 0 breast CA s/p L breast radiation, htn, renal colic,  fibromyalgia, hypothyroidism, anxiety, sent by dr. hoover for chest pain eval and ccta.  pt says has been having L chest pain x 2 weeks.  radiates to her breast.  saw dr. zuluaga yesterday and had normal breast exam.  pt admits chest pain worse after exertion.  pt had neg cath 8 yrs ago.  as per dr. hoover, had 2 neg nuc stress in the last few years.  no n/v/d, no sob, no abd pain, no new leg swelling.  exam: nad, ncat, perrl, eomi, mmm, rrr, ctab, abd soft, nt,nd, obese, L breast mildly erythematous, no warmth, mildly ttp L upper quadrant of breast and chest wall superior to the breast tissue, no palpable mass imp: pt with L chest pain, sent by dr. hoover for chest pain eval, ccta

## 2020-09-03 NOTE — ED PROVIDER NOTE - NSFOLLOWUPINSTRUCTIONS_ED_ALL_ED_FT
Nonspecific Chest Pain  Chest pain can be caused by many different conditions. Some causes of chest pain can be life-threatening. These will require treatment right away. Serious causes of chest pain include:  Heart attack.A tear in the body's main blood vessel.Redness and swelling (inflammation) around your heart.Blood clot in your lungs.Other causes of chest pain may not be so serious. These include:  Heartburn.Anxiety or stress.Damage to bones or muscles in your chest.Lung infections.Chest pain can feel like:  Pain or discomfort in your chest.Crushing, pressure, aching, or squeezing pain. Burning or tingling. Dull or sharp pain that is worse when you move, cough, or take a deep breath. Pain or discomfort that is also felt in your back, neck, jaw, shoulder, or arm, or pain that spreads to any of these areas.It is hard to know whether your pain is caused by something that is serious or something that is not so serious. So it is important to see your doctor right away if you have chest pain.  Follow these instructions at home:  Medicines     Take over-the-counter and prescription medicines only as told by your doctor.If you were prescribed an antibiotic medicine, take it as told by your doctor. Do not stop taking the antibiotic even if you start to feel better.Lifestyle        Rest as told by your doctor.Do not use any products that contain nicotine or tobacco, such as cigarettes, e-cigarettes, and chewing tobacco. If you need help quitting, ask your doctor.Do not drink alcohol.Make lifestyle changes as told by your doctor. These may include:  Getting regular exercise. Ask your doctor what activities are safe for you.Eating a heart-healthy diet. A diet and nutrition specialist (dietitian) can help you to learn healthy eating options.Staying at a healthy weight.Treating diabetes or high blood pressure, if needed.Lowering your stress. Activities such as yoga and relaxation techniques can help.General instructions     Pay attention to any changes in your symptoms. Tell your doctor about them or any new symptoms.Avoid any activities that cause chest pain.Keep all follow-up visits as told by your doctor. This is important. You may need more testing if your chest pain does not go away.Contact a doctor if:  Your chest pain does not go away.You feel depressed.You have a fever.Get help right away if:  Your chest pain is worse.You have a cough that gets worse, or you cough up blood.You have very bad (severe) pain in your belly (abdomen).You pass out (faint).You have either of these for no clear reason:  Sudden chest discomfort.Sudden discomfort in your arms, back, neck, or jaw.You have shortness of breath at any time.You suddenly start to sweat, or your skin gets clammy.You feel sick to your stomach (nauseous).You throw up (vomit).You suddenly feel lightheaded or dizzy.You feel very weak or tired.Your heart starts to beat fast, or it feels like it is skipping beats.These symptoms may be an emergency. Do not wait to see if the symptoms will go away. Get medical help right away. Call your local emergency services (911 in the [place] Do not drive yourself to the hospital.   Summary  Chest pain can be caused by many different conditions. The cause may be serious and need treatment right away. If you have chest pain, see your doctor right away.Follow your doctor's instructions for taking medicines and making lifestyle changes. Keep all follow-up visits as told by your doctor. This includes visits for any further testing if your chest pain does not go away.Be sure to know the signs that show that your condition has become worse. Get help right away if you have these symptoms.This information is not intended to replace advice given to you by your health care provider. Make sure you discuss any questions you have with your health care provider.    Document Released: 06/05/2009 Document Revised: 06/20/2019 Document Reviewed: 06/20/2019  Else"[employer] Interactive Patient Education © 2019 [employer]

## 2020-09-03 NOTE — ED ADULT NURSE NOTE - OBJECTIVE STATEMENT
Pt. came to ED with c/o chest pain that radiates to left shoulder blade that started last night. Last night was nausea and had episode of diarrhea.  Pt believes the diarrhea was from nerves. Recent dx of breast CA in January and finished radiation in left breast in April 2020. Reports left breast tender at baseline. Denies sob, cough, fever, chills, or vomiting.

## 2020-09-03 NOTE — ED PROVIDER NOTE - CLINICAL SUMMARY MEDICAL DECISION MAKING FREE TEXT BOX
Pt with L chest/breast pain, sent by dr. hoover for ccta, neg for cad, pt to f/u with dr. edgar and dr. hoover outpt

## 2020-09-03 NOTE — ED ADULT TRIAGE NOTE - CHIEF COMPLAINT QUOTE
Patient c/o chest pain as well as back and neck pain and nausea last night states she was seen by PMD and ekg was normal

## 2020-09-03 NOTE — ED PROVIDER NOTE - OBJECTIVE STATEMENT
60 y.o. female with a PMH of HTN, renal colic, migraine headaches, fibromyalgia, thyroid ca/hypothyroidism (s/p total thyroidectomy 2000), Left breast Ca. s/p radiation/lumpectomy, GERD, s/p melanoma excision 2017, s/p hysterectomy 11/2019, s/p lap dereck. 2015, and Left total knee replacement 2018 presented to the ER c/o intermittent mid-sternal chest pressure and Left breast pain for the past 2 weeks.  Pt denies fever, chills, cough, SOB, nausea, vomiting, diaphoresis, palpitations, recent travel, drug use.  Pt has had two nuclear stress tests recently with negative results.  Angiogram 8 years ago negative.  Pain is exertional and non-exertional.  Pain worse and reproducible in the Left breast.  No other complaints.

## 2020-09-03 NOTE — ED PROVIDER NOTE - CARE PROVIDER_API CALL
Basia Stevens  SURGERY  [facility], [address] [place] [address]  Phone: [phone]  Fax: [phone]  Follow Up Time: 1-3 Days

## 2020-09-03 NOTE — ED PROVIDER NOTE - PATIENT PORTAL LINK FT
You can access the FollowMyHealth Patient Portal offered by [facility] by registering at the following website: [email or web address]. By joining [employer]’s FollowMyHealth portal, you will also be able to view your health information using other applications (apps) compatible with our system.

## 2020-09-04 ENCOUNTER — APPOINTMENT (OUTPATIENT)
Dept: BREAST CENTER | Facility: CLINIC | Age: 60
End: 2020-09-04
Payer: COMMERCIAL

## 2020-09-04 PROCEDURE — 99212 OFFICE O/P EST SF 10 MIN: CPT

## 2020-09-04 NOTE — PHYSICAL EXAM
[No Cervical Adenopathy] : no cervical adenopathy [No Supraclavicular Adenopathy] : no supraclavicular adenopathy [Examined in the supine and seated position] : examined in the supine and seated position [Breast Mass Right Breast ___cm] : no masses [Breast Nipple Retraction] : nipples not retracted [Breast Mass Left Breast ___cm] : no masses [Breast Nipple Inversion] : nipples not inverted [No Axillary Lymphadenopathy] : no right axillary lymphadenopathy [Breast Nipple Flattening] : nipples not flattened [No Edema] : no edema [Full ROM] : full range of motion [No Swelling] : no swelling

## 2020-09-04 NOTE — ASSESSMENT
[FreeTextEntry1] : Holli has a h/o left breast lumpectomy for DCIS.  She has a benign CBE. There are no palpable findings, axillary lymphadenopathy, nipple discharge or inversion. I reviewed her recent imaging. I reassured her that the pain is likely related to the WBI and should improve.\par \par She will need a bilateral dx mammo and sono in 12/20 and f/up after imaging.\par \par I spent a total of 10 minutes of face to face time with this patient, greater than 50% of which was spent in counseling and/or coordination of care.  All of her questions were appropriately answered.

## 2020-09-04 NOTE — HISTORY OF PRESENT ILLNESS
[FreeTextEntry1] : Holli has a h/o left breast extensive DCIS.  She has h/o left breast lumpectomy on 01/27/20 and received WBI from 3/20-4/20.  Her recent left dx mammo and sono were benign. She presents today with diffuse pain of the left breast.

## 2020-09-09 ENCOUNTER — APPOINTMENT (OUTPATIENT)
Dept: BREAST CENTER | Facility: CLINIC | Age: 60
End: 2020-09-09

## 2020-09-10 ENCOUNTER — APPOINTMENT (OUTPATIENT)
Dept: ORTHOPEDIC SURGERY | Facility: CLINIC | Age: 60
End: 2020-09-10
Payer: OTHER MISCELLANEOUS

## 2020-09-10 VITALS
BODY MASS INDEX: 44.22 KG/M2 | DIASTOLIC BLOOD PRESSURE: 88 MMHG | HEIGHT: 64 IN | SYSTOLIC BLOOD PRESSURE: 129 MMHG | WEIGHT: 259 LBS | HEART RATE: 76 BPM

## 2020-09-10 DIAGNOSIS — Z96.652 PRESENCE OF LEFT ARTIFICIAL KNEE JOINT: ICD-10-CM

## 2020-09-10 PROCEDURE — 73562 X-RAY EXAM OF KNEE 3: CPT | Mod: LT

## 2020-09-10 PROCEDURE — 99204 OFFICE O/P NEW MOD 45 MIN: CPT

## 2020-09-10 NOTE — PHYSICAL EXAM
[de-identified] : The patient appears well nourished and in no apparent distress.  The patient is alert and oriented to person, place, and time.   Affect and mood appear normal. The head is normocephalic and atraumatic.  The eyes reveal normal sclera and extra ocular muscles are intact. The tongue is midline with no apparent lesions.  Skin shows normal turgor with no evidence of eczema or psoriasis.  No respiratory distress noted.  Sensation grossly intact.\par   [de-identified] : Exam of the left knee shows a healed prior incision. No instability in extension, -5 to 112 degrees of flexion measured with a goniometer.  Mild instability in flexion.  5/5 motor strength bilaterally distally. Sensation intact distally.  [de-identified] : MRI - performed at [facility] on 5/08/2020 of the left knee- \par \par Left total knee arthroplasty with extensive metallic susceptibility artifact degrading quality of the images.\par \par No definite MRI evidence of osteolysis or knee joint effusion to suggest loosening.\par \par Mild stress related bone marrow edema at the medial proximal tibia.\par \par Grade 1 sprain of the medial collateral ligament.\par \par X-ray 3 views left knee shows a left knee replacement with press-fit components.  Tibial component is in varus with sclerosis of the medial tibial metaphysis.

## 2020-09-10 NOTE — HISTORY OF PRESENT ILLNESS
[de-identified] : The patient is a 60 year old female being seen for evaluation of her left knee. She denies injury, trauma, or change of activity. She is 2 years s/p left TKR by Dr. Forman at [facility]". She reports having pain and swelling in the replacement since surgery. She reports seeing another orthopedist for a second opinion and having an MRI preformed. She reports results of the MRI are available for review in the office today. She is ambulating and transferring with pain and stiffness. She reports pain is localized in the medial aspect of the left knee. She reports instances of weakness and a buckling sensation, but no true instances of buckling. Pain is worse with transferring and weightbearing activities, most especially stair climbing. She reports having lidocaine injections in the left knee with mild relief of her symptoms. She comes in today for evaluation of her left and for treatment options.

## 2020-09-10 NOTE — DISCUSSION/SUMMARY
[de-identified] : The patient is a 60 year old female 2 years s/p left TKR by another surgeon.  On x-ray she has varus alignment of the tibial component with sclerosis of the medial tibial metaphysis which could suggest varus tibial collapse however I do not have prior x-rays to compare to.  I have requested that the patient obtain prior x-rays so that we can see over time whether the tibial component positioning has changed.  We discussed that sometimes tibial components were placed in varus intentionally and so it would be appropriate to see if the position has changed over time.  If it has collapsed into varus she may benefit from revision knee replacement surgery however we discussed that prior to doing that I would recommend weight loss to reduce her perioperative risk.  We will determine further course of treatment once we have the prior x-rays to compare to.  Otherwise she has fairly good range of motion of the knee and no significant instability.

## 2020-09-10 NOTE — CONSULT LETTER
[Consult Letter:] : I had the pleasure of evaluating your patient, [unfilled]. [Dear  ___] : Dear  [unfilled], [Consult Closing:] : Thank you very much for allowing me to participate in the care of this patient.  If you have any questions, please do not hesitate to contact me. [Please see my note below.] : Please see my note below. [Sincerely,] : Sincerely, [FreeTextEntry2] : ANATOLIY LORENZO MD\par  [FreeTextEntry3] : Warren Guerrero MD\par Chief of Joint Replacement\par Primary & Revision Hip and Knee Replacement \par [facility]\par \par

## 2020-09-13 NOTE — HISTORY OF PRESENT ILLNESS
[de-identified] : 61 yo female s/p fit for left knee brace doing great. She reports ambulation/mobility is great and pain is managed without pain meds. She reports no other complaints at this time.\par

## 2020-09-13 NOTE — PHYSICAL EXAM
[de-identified] : Not in acute distress, dressed appropriately, sitting on examination table \par Skin: Warm and dry, normal turgor, no rashes \par Neurological: AOx3, Cranial nerves grossly in tact \par Psych: Mood and affect appropriate \par Focused exam of the Right Knee: No swelling edema erythema redness or drainage. Non-tender to palpation. Non-tender w/ MCL & LCL examination. Alignment: Neutral. ROM: 0-120. Stable. 5/5 Strength. DNVI. Ambulates without devices.\par \par Focused exam of the Left Knee: No swelling edema erythema redness or drainage. Non-tender to palpation. Non-tender w/ MCL & LCL examination. Alignment: Neutral. ROM: 0-120. Stable. 5/5 Strength. DNVI. Ambulates without devices.\par \par  [de-identified] : none today

## 2020-09-13 NOTE — ASSESSMENT
[FreeTextEntry1] : Assessment/Plan:\par 59 yo female here with bilateral knee osteoarthritis, doing well.\par \par Plan:\par \par 1) Prescribed meloxicam\par 2) Referral for Degregoris \par 2) F/U in 3 months.\par \par All medical record entries made by the PA/Scribe/Fellow are at my, Dr. Velasquez Chatman's direction and personally dictated by me on 08/26/2020]. I have reviewed the chart and agree that the record accurately reflects my personal performance of the history, physical exam, assessment, and plan. I have also personally directed reviewed, and agreed with the chart.\par

## 2020-09-25 ENCOUNTER — APPOINTMENT (OUTPATIENT)
Dept: ORTHOPEDIC SURGERY | Facility: CLINIC | Age: 60
End: 2020-09-25

## 2020-11-06 ENCOUNTER — APPOINTMENT (OUTPATIENT)
Dept: RADIATION ONCOLOGY | Facility: HOSPITAL | Age: 60
End: 2020-11-06
Payer: COMMERCIAL

## 2020-11-06 VITALS
RESPIRATION RATE: 12 BRPM | HEART RATE: 82 BPM | OXYGEN SATURATION: 100 % | SYSTOLIC BLOOD PRESSURE: 143 MMHG | BODY MASS INDEX: 45.14 KG/M2 | TEMPERATURE: 96.8 F | DIASTOLIC BLOOD PRESSURE: 68 MMHG | WEIGHT: 263 LBS

## 2020-11-06 PROCEDURE — 99213 OFFICE O/P EST LOW 20 MIN: CPT

## 2020-11-06 RX ORDER — DICLOFENAC SODIUM 10 MG/G
1 GEL TOPICAL DAILY
Qty: 1 | Refills: 0 | Status: DISCONTINUED | COMMUNITY
Start: 2020-08-26 | End: 2020-11-06

## 2020-11-06 NOTE — LETTER CLOSING
[Consult Closing:] : Thank you for allowing me to participate in the care of this patient.  If you have any questions, please do not hesitate to contact me. [Sincerely yours,] : Sincerely yours, [FreeTextEntry3] : Meliza Oswald M.D. \par \par Electronically proofread and signed by:  Meliza Oswald MD\par Attending, Department of Radiation Medicine\par [facility], [place]\par \par CC: Dr. Joseph

## 2020-11-06 NOTE — DISEASE MANAGEMENT
[Pathological] : TNM Stage: p [0] : 0 [FreeTextEntry4] : Intermediate grade DCIS of the left breast, ER/AZ positive.   [TTNM] : is [NTNM] : 0 [MTNM] : 0 [de-identified] : left breast

## 2020-11-06 NOTE — PHYSICAL EXAM
[Obese] : obese [Heart Rate And Rhythm] : heart rate and rhythm were normal [Heart Sounds] : normal S1 and S2 [Murmurs] : no murmurs present [Arterial Pulses Normal] : the arterial pulses were normal [___ +] : bilateral [unfilled]U+ pitting edema to the ankles [Abdomen Soft] : soft [Abdomen Tenderness] : non-tender [Normal] : no focal deficits [de-identified] : She is examined in both the upright and supine positions.  The right breast is unremarkable.    The left breast has some residual hyperpigmentation and mild edema post treatment.  There is a small healing area on the medial breast that appears to be from a punch biopsy (Derm).   No palpable mass in either breast.

## 2020-11-09 ENCOUNTER — EMERGENCY (EMERGENCY)
Facility: HOSPITAL | Age: 60
LOS: 0 days | Discharge: HOME | End: 2020-11-09
Attending: EMERGENCY MEDICINE | Admitting: EMERGENCY MEDICINE
Payer: COMMERCIAL

## 2020-11-09 VITALS
RESPIRATION RATE: 18 BRPM | DIASTOLIC BLOOD PRESSURE: 78 MMHG | SYSTOLIC BLOOD PRESSURE: 175 MMHG | OXYGEN SATURATION: 100 % | HEART RATE: 62 BPM

## 2020-11-09 VITALS
DIASTOLIC BLOOD PRESSURE: 85 MMHG | OXYGEN SATURATION: 100 % | SYSTOLIC BLOOD PRESSURE: 178 MMHG | HEIGHT: 64 IN | HEART RATE: 77 BPM | TEMPERATURE: 99 F | WEIGHT: 261.91 LBS | RESPIRATION RATE: 18 BRPM

## 2020-11-09 DIAGNOSIS — Z98.890 OTHER SPECIFIED POSTPROCEDURAL STATES: Chronic | ICD-10-CM

## 2020-11-09 DIAGNOSIS — Z98.890 OTHER SPECIFIED POSTPROCEDURAL STATES: ICD-10-CM

## 2020-11-09 DIAGNOSIS — Z96.652 PRESENCE OF LEFT ARTIFICIAL KNEE JOINT: Chronic | ICD-10-CM

## 2020-11-09 DIAGNOSIS — R07.9 CHEST PAIN, UNSPECIFIED: ICD-10-CM

## 2020-11-09 DIAGNOSIS — R07.89 OTHER CHEST PAIN: ICD-10-CM

## 2020-11-09 DIAGNOSIS — Z88.8 ALLERGY STATUS TO OTHER DRUGS, MEDICAMENTS AND BIOLOGICAL SUBSTANCES: ICD-10-CM

## 2020-11-09 DIAGNOSIS — Z90.710 ACQUIRED ABSENCE OF BOTH CERVIX AND UTERUS: Chronic | ICD-10-CM

## 2020-11-09 DIAGNOSIS — Z90.49 ACQUIRED ABSENCE OF OTHER SPECIFIED PARTS OF DIGESTIVE TRACT: Chronic | ICD-10-CM

## 2020-11-09 LAB
ALBUMIN SERPL ELPH-MCNC: 4.2 G/DL — SIGNIFICANT CHANGE UP (ref 3.5–5.2)
ALP SERPL-CCNC: 70 U/L — SIGNIFICANT CHANGE UP (ref 30–115)
ALT FLD-CCNC: 15 U/L — SIGNIFICANT CHANGE UP (ref 0–41)
ANION GAP SERPL CALC-SCNC: 11 MMOL/L — SIGNIFICANT CHANGE UP (ref 7–14)
AST SERPL-CCNC: 14 U/L — SIGNIFICANT CHANGE UP (ref 0–41)
BASOPHILS # BLD AUTO: 0.03 K/UL — SIGNIFICANT CHANGE UP (ref 0–0.2)
BASOPHILS NFR BLD AUTO: 0.5 % — SIGNIFICANT CHANGE UP (ref 0–1)
BILIRUB SERPL-MCNC: 1.3 MG/DL — HIGH (ref 0.2–1.2)
BUN SERPL-MCNC: 18 MG/DL — SIGNIFICANT CHANGE UP (ref 10–20)
CALCIUM SERPL-MCNC: 9.4 MG/DL — SIGNIFICANT CHANGE UP (ref 8.5–10.1)
CHLORIDE SERPL-SCNC: 104 MMOL/L — SIGNIFICANT CHANGE UP (ref 98–110)
CO2 SERPL-SCNC: 29 MMOL/L — SIGNIFICANT CHANGE UP (ref 17–32)
CREAT SERPL-MCNC: 0.7 MG/DL — SIGNIFICANT CHANGE UP (ref 0.7–1.5)
EOSINOPHIL # BLD AUTO: 0.1 K/UL — SIGNIFICANT CHANGE UP (ref 0–0.7)
EOSINOPHIL NFR BLD AUTO: 1.7 % — SIGNIFICANT CHANGE UP (ref 0–8)
GLUCOSE SERPL-MCNC: 110 MG/DL — HIGH (ref 70–99)
HCT VFR BLD CALC: 38.9 % — SIGNIFICANT CHANGE UP (ref 37–47)
HGB BLD-MCNC: 12.1 G/DL — SIGNIFICANT CHANGE UP (ref 12–16)
IMM GRANULOCYTES NFR BLD AUTO: 0.5 % — HIGH (ref 0.1–0.3)
LYMPHOCYTES # BLD AUTO: 0.73 K/UL — LOW (ref 1.2–3.4)
LYMPHOCYTES # BLD AUTO: 12 % — LOW (ref 20.5–51.1)
MAGNESIUM SERPL-MCNC: 1.8 MG/DL — SIGNIFICANT CHANGE UP (ref 1.8–2.4)
MCHC RBC-ENTMCNC: 20.4 PG — LOW (ref 27–31)
MCHC RBC-ENTMCNC: 31.1 G/DL — LOW (ref 32–37)
MCV RBC AUTO: 65.6 FL — LOW (ref 81–99)
MONOCYTES # BLD AUTO: 0.4 K/UL — SIGNIFICANT CHANGE UP (ref 0.1–0.6)
MONOCYTES NFR BLD AUTO: 6.6 % — SIGNIFICANT CHANGE UP (ref 1.7–9.3)
NEUTROPHILS # BLD AUTO: 4.77 K/UL — SIGNIFICANT CHANGE UP (ref 1.4–6.5)
NEUTROPHILS NFR BLD AUTO: 78.7 % — HIGH (ref 42.2–75.2)
NRBC # BLD: 0 /100 WBCS — SIGNIFICANT CHANGE UP (ref 0–0)
NT-PROBNP SERPL-SCNC: 184 PG/ML — SIGNIFICANT CHANGE UP (ref 0–300)
PLATELET # BLD AUTO: 276 K/UL — SIGNIFICANT CHANGE UP (ref 130–400)
POTASSIUM SERPL-MCNC: 4.3 MMOL/L — SIGNIFICANT CHANGE UP (ref 3.5–5)
POTASSIUM SERPL-SCNC: 4.3 MMOL/L — SIGNIFICANT CHANGE UP (ref 3.5–5)
PROT SERPL-MCNC: 7 G/DL — SIGNIFICANT CHANGE UP (ref 6–8)
RBC # BLD: 5.93 M/UL — HIGH (ref 4.2–5.4)
RBC # FLD: 17.9 % — HIGH (ref 11.5–14.5)
SODIUM SERPL-SCNC: 144 MMOL/L — SIGNIFICANT CHANGE UP (ref 135–146)
TROPONIN T SERPL-MCNC: <0.01 NG/ML — SIGNIFICANT CHANGE UP
WBC # BLD: 6.06 K/UL — SIGNIFICANT CHANGE UP (ref 4.8–10.8)
WBC # FLD AUTO: 6.06 K/UL — SIGNIFICANT CHANGE UP (ref 4.8–10.8)

## 2020-11-09 PROCEDURE — 93010 ELECTROCARDIOGRAM REPORT: CPT

## 2020-11-09 PROCEDURE — 99285 EMERGENCY DEPT VISIT HI MDM: CPT | Mod: 25

## 2020-11-09 PROCEDURE — 93308 TTE F-UP OR LMTD: CPT | Mod: 26

## 2020-11-09 PROCEDURE — 71045 X-RAY EXAM CHEST 1 VIEW: CPT | Mod: 26

## 2020-11-09 RX ORDER — ASPIRIN/CALCIUM CARB/MAGNESIUM 324 MG
325 TABLET ORAL ONCE
Refills: 0 | Status: COMPLETED | OUTPATIENT
Start: 2020-11-09 | End: 2020-11-09

## 2020-11-09 RX ORDER — ASPIRIN/CALCIUM CARB/MAGNESIUM 324 MG
325 TABLET ORAL ONCE
Refills: 0 | Status: DISCONTINUED | OUTPATIENT
Start: 2020-11-09 | End: 2020-11-09

## 2020-11-09 RX ADMIN — Medication 325 MILLIGRAM(S): at 07:58

## 2020-11-09 NOTE — ED ADULT NURSE NOTE - NSIMPLEMENTINTERV_GEN_ALL_ED
Implemented All Universal Safety Interventions:  [place] to call system. Call bell, personal items and telephone within reach. Instruct patient to call for assistance. Room bathroom lighting operational. Non-slip footwear when patient is off stretcher. Physically safe environment: no spills, clutter or unnecessary equipment. Stretcher in lowest position, wheels locked, appropriate side rails in place.

## 2020-11-09 NOTE — ED PROVIDER NOTE - OBJECTIVE STATEMENT
61 y/o female with hx HTN, Hypothyroid, Breast Ca s/p Lumpectomy s/p Radiation, Fibromyalgia presents to the ED c/o "I've had left sided chest pain on-off since radiation treatment. I had a CCTA 2 months ago that was normal and I saw my cardiologist Dr Cleaning. Last night I woke up with left sided chest burning/ aching and then I had some palpitations. " no SOB/ cough/ fever/ chills/ leg pain/ GOEL

## 2020-11-09 NOTE — ED ADULT NURSE REASSESSMENT NOTE - NS ED NURSE REASSESS COMMENT FT1
Pt to be d/c'd as per YOGESH Sandoval. PA aware of pt vitals, okay to be d/c'd. Pt denies any symptoms.

## 2020-11-09 NOTE — ED PROVIDER NOTE - CLINICAL SUMMARY MEDICAL DECISION MAKING FREE TEXT BOX
pt with history as above presenting with L sided chest pain, intermittent since finishing radiation treatment months ago. since, has had neg CCTA, followed with cardiology. no DVT/PE risk factors. CA in remission for several months. Well appearing, NAD, non toxic. NCAT PERRLA EOMI neck supple non tender normal wob +L sided CP reproducible on palpation, cta bl rrr abdomen s nt nd no rebound no guarding WWPx4 neuro non focal no LE edema. Aware of all results, given a copy of all available results, comfortable with discharge and follow-up outpatient, strict return precautions given. Endorses understanding of all of this and aware that they can return at any time for new or concerning symptoms. No further questions or concerns at this time

## 2020-11-09 NOTE — ED ADULT NURSE NOTE - CHPI ED NUR SYMPTOMS NEG
no nausea/no congestion/no chills/no syncope/no vomiting/no fever/no diaphoresis/no dizziness/no back pain/no shortness of breath

## 2020-11-09 NOTE — ED PROVIDER NOTE - PATIENT PORTAL LINK FT
You can access the FollowMyHealth Patient Portal offered by [facility] by registering at the following website: [email or web address]. By joining [employer]’s FollowMyHealth portal, you will also be able to view your health information using other applications (apps) compatible with our system.

## 2020-11-09 NOTE — ED PROVIDER NOTE - CARE PROVIDER_API CALL
Basil Cleaning)  Cardiology; Internal Medicine; Nuclear Cardiology  [address] [address]  Phone: [phone]  Fax: [phone]  Follow Up Time:

## 2020-11-09 NOTE — ED ADULT TRIAGE NOTE - CHIEF COMPLAINT QUOTE
pt sts waking up at 3 am with left sided cp, non radiating, with no modifying factors, now resolved. took xanax 1hr ago . s/p left breast radiation.

## 2020-11-09 NOTE — ED ADULT NURSE NOTE - OBJECTIVE STATEMENT
59 y/o female came in complaining of non radiating chest pain that started at about 3am since has resolved, pt took a xanax at 1 hour before coming to the ED.

## 2020-12-14 ENCOUNTER — RESULT REVIEW (OUTPATIENT)
Age: 60
End: 2020-12-14

## 2020-12-14 ENCOUNTER — OUTPATIENT (OUTPATIENT)
Dept: OUTPATIENT SERVICES | Facility: HOSPITAL | Age: 60
LOS: 1 days | Discharge: HOME | End: 2020-12-14
Payer: COMMERCIAL

## 2020-12-14 DIAGNOSIS — Z90.49 ACQUIRED ABSENCE OF OTHER SPECIFIED PARTS OF DIGESTIVE TRACT: Chronic | ICD-10-CM

## 2020-12-14 DIAGNOSIS — Z98.890 OTHER SPECIFIED POSTPROCEDURAL STATES: Chronic | ICD-10-CM

## 2020-12-14 DIAGNOSIS — Z85.3 PERSONAL HISTORY OF MALIGNANT NEOPLASM OF BREAST: ICD-10-CM

## 2020-12-14 DIAGNOSIS — Z90.710 ACQUIRED ABSENCE OF BOTH CERVIX AND UTERUS: Chronic | ICD-10-CM

## 2020-12-14 DIAGNOSIS — Z96.652 PRESENCE OF LEFT ARTIFICIAL KNEE JOINT: Chronic | ICD-10-CM

## 2020-12-14 PROCEDURE — 76641 ULTRASOUND BREAST COMPLETE: CPT | Mod: 26,50

## 2020-12-14 PROCEDURE — G0279: CPT | Mod: 26

## 2020-12-14 PROCEDURE — 77066 DX MAMMO INCL CAD BI: CPT | Mod: 26

## 2020-12-18 NOTE — DISCHARGE NOTE PROVIDER - NSDCQMSTAIRS_GEN_ALL_CORE
Problem: Respiratory Impairment - Respiratory Therapy 253  Intervention: Airway clearance techniques  Note: Intervention Status  Done      No

## 2021-01-28 ENCOUNTER — APPOINTMENT (OUTPATIENT)
Dept: HEMATOLOGY ONCOLOGY | Facility: CLINIC | Age: 61
End: 2021-01-28
Payer: COMMERCIAL

## 2021-01-28 ENCOUNTER — OUTPATIENT (OUTPATIENT)
Dept: OUTPATIENT SERVICES | Facility: HOSPITAL | Age: 61
LOS: 1 days | Discharge: HOME | End: 2021-01-28

## 2021-01-28 VITALS
TEMPERATURE: 96 F | WEIGHT: 260 LBS | BODY MASS INDEX: 44.39 KG/M2 | SYSTOLIC BLOOD PRESSURE: 116 MMHG | DIASTOLIC BLOOD PRESSURE: 76 MMHG | HEIGHT: 64 IN | HEART RATE: 76 BPM

## 2021-01-28 DIAGNOSIS — Z98.890 OTHER SPECIFIED POSTPROCEDURAL STATES: Chronic | ICD-10-CM

## 2021-01-28 DIAGNOSIS — Z90.710 ACQUIRED ABSENCE OF BOTH CERVIX AND UTERUS: Chronic | ICD-10-CM

## 2021-01-28 DIAGNOSIS — Z90.49 ACQUIRED ABSENCE OF OTHER SPECIFIED PARTS OF DIGESTIVE TRACT: Chronic | ICD-10-CM

## 2021-01-28 DIAGNOSIS — Z96.652 PRESENCE OF LEFT ARTIFICIAL KNEE JOINT: Chronic | ICD-10-CM

## 2021-01-28 PROCEDURE — 99214 OFFICE O/P EST MOD 30 MIN: CPT

## 2021-01-28 RX ORDER — MULTIVITAMIN
TABLET ORAL
Refills: 0 | Status: ACTIVE | COMMUNITY

## 2021-01-28 RX ORDER — AMOXICILLIN AND CLAVULANATE POTASSIUM 875; 125 MG/1; MG/1
875-125 TABLET, COATED ORAL TWICE DAILY
Qty: 14 | Refills: 0 | Status: COMPLETED | COMMUNITY
Start: 2020-02-11 | End: 2020-02-28

## 2021-01-31 NOTE — ASSESSMENT
[FreeTextEntry1] : 58 yo female has left breast breast DCIS, intermediate nuclear grade, ER/IL positive, s/p lumpectomy with negative margins. \par \par Recommendation:\par -- Continue Anastrozole daily, calcium and vitamin D supplement. Anastrozole e-refilled.\par -- Reviewed bone density report. Encourage regular exercise. A referral is given for repeat bone density.\par -- Reviewed b/l dx mammo and US in 12/2020. There is 1.3 x 1.1 x 0.4 cm hypoechoic mass in the medial left breast, probably benign. She will have follow-up unilateral diagnostic mammogram and left ultrasound in 6 months.\par -- Followup with Dr. Stevens and Dr. Oswald as indicated.\par -- Followup with PCP for health maintenance. \par -- RTO for followup in 6 months.\par \par Case was seen and discussed with Dr. Joseph who agreed with the assessment and plan.\par

## 2021-01-31 NOTE — CONSULT LETTER
[Dear  ___] : Dear  [unfilled], [Courtesy Letter:] : I had the pleasure of seeing your patient, [unfilled], in my office today. [Please see my note below.] : Please see my note below. [Sincerely,] : Sincerely, [DrJomar ___] : Dr. ELIZABETH [FreeTextEntry3] : Solo Joseph MD

## 2021-01-31 NOTE — PHYSICAL EXAM
[Fully active, able to carry on all pre-disease performance without restriction] : Status 0 - Fully active, able to carry on all pre-disease performance without restriction [Normal] : affect appropriate [de-identified] : Status post left breast lumpectomy. Surgical scar is healing well. There is no palpable abnormality.

## 2021-01-31 NOTE — HISTORY OF PRESENT ILLNESS
[de-identified] : 60 yo female is referred by Dr. Stevens for consultation of adjuvant endocrine therapy. The patient had a screening mammogram on 12/12/19 which showed mass and calcifications in the left breast requiring additional evaluation. On 12/20/19, left breast dx mammo and US showed segmental calcifications in the UOQ of the left breast spanning over about 5 cm. The previously noted apparent mass persisted on spot compression views and corresponded with a benign cyst seen on concurrent US.\par \par On 12/24/19, stereotactic core biopsy of LUOQ, Posterior and anterior calcifications, Calcifications span 5 CM both revealed DCIS solid and cribriform types with comedo necrosis and calcifications, intermediate nuclear grade. \par Estrogen receptor positive, 95%\par Progesterone receptor positive, 75%\par \par Her family history is significant for her maternal aunt with breast cancer in her 70's; her maternal first cousin with breast cancer in her 50's; her paternal first cousin with breast cancer in her 50's. 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 No genetic testing in the patient or her family. \par \par On 1/27/2020, she underwent Left NLOC. The pathology revealed 2 healing prior biopsy sites with widespread DCIS, micropapillary and comedo types associated with calcifications, intermediated nuclear grade and present in 21/35 slides. The inferiore margin was involved and lateral margin was 1.0 mm. Reexcision of left inferior and lateral margins were negative. \par \par The patient recovered well from surgery. She is here today to discuss adjuvant endocrine therapy.\par \par She had RATLH/BSO for persistent PMB in 11/2019. The pathology revealed benign endometrial polyps, corpus uteri showing intramural leiomyomas and ovaries without significant pathologic change.\par  [de-identified] : 7/27/2020:\par The patient is here for followup visit. She was diagnosed with  left breast breast DCIS, intermediate nuclear grade, ER/KS positive, s/p lumpectomy with negative margins. She received adjuvant WBI with 5240cGy to the left breast from 3/16/2020 through 4/6/2020 without any complications. She has been taking Anastrozole daily since 4/2020 and tolerated well. She had bone density in 2/2020 which was within normal limit. She does not have breast related complains. \par \par 1/28/2021:\par The patient is here for followup visit. She was diagnosed with  left breast breast DCIS, intermediate nuclear grade, ER/KS positive, s/p lumpectomy on 1/27/2020 with negative margins. She received adjuvant WBI with 5240cGy to the left breast from 3/16/2020 through 4/6/2020 without any complications. She has been taking Anastrozole daily since 4/2020 and tolerated well. She had bone density in 2/2020 which was within normal limit. She does not have breast related complains. On 12/14/2020, she had b/l dx mammo and US which showed post left lumpectomy and radiation therapy with expected posttreatment changes in. No abnormal clusters of microcalcifications. Recommendation: Follow-up unilateral diagnostic mammogram and left ultrasound in 6 months.  Bone Density from 2/19/20 was normal.\par

## 2021-02-03 DIAGNOSIS — D05.12 INTRADUCTAL CARCINOMA IN SITU OF LEFT BREAST: ICD-10-CM

## 2021-02-03 DIAGNOSIS — Z79.811 LONG TERM (CURRENT) USE OF AROMATASE INHIBITORS: ICD-10-CM

## 2021-04-16 ENCOUNTER — APPOINTMENT (OUTPATIENT)
Dept: BREAST CENTER | Facility: CLINIC | Age: 61
End: 2021-04-16
Payer: COMMERCIAL

## 2021-04-16 VITALS
DIASTOLIC BLOOD PRESSURE: 86 MMHG | TEMPERATURE: 97.8 F | BODY MASS INDEX: 44.39 KG/M2 | WEIGHT: 260 LBS | SYSTOLIC BLOOD PRESSURE: 140 MMHG | HEIGHT: 64 IN

## 2021-04-16 PROCEDURE — 99072 ADDL SUPL MATRL&STAF TM PHE: CPT

## 2021-04-16 PROCEDURE — 99213 OFFICE O/P EST LOW 20 MIN: CPT

## 2021-04-16 NOTE — HISTORY OF PRESENT ILLNESS
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  \par Estrogen receptor positive, 95\par Progesterone receptor positive, 75\par \par Left breast DCIS, micropapillary and solid types with comedo necrosis and calcifications, intermediate nuclear grade on stereotactic core biopsy 12/24/19; LUOQ, Anterior calcifications, Calcifications span 5 CM (tophat). \par Estrogen receptor positive, 95\par Progesterone receptor positive, 75\par \par No prior complaints related to the breasts. \par Her family history is significant for her maternal aunt with breast cancer in her 70's; her maternal first cousin with breast cancer in her 50's; her paternal first cousin with breast cancer in her 50's. 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  No genetic testing in the patient or her family.  \par \par Status post Left NLOC 1/27/20.  \par \par CORY RECIO is a 61 year old female patient who presents today in follow up for left breast DCIS.\par Since her last visit, she has no new breast related complaints - she continues to have sporadic left breast pain. Imaging was done on 12/14/2020, which revealed 1.3 x 1.1 x 0.4 cm hypoechoic mass in the medial left breast, probably benign.  Post left lumpectomy and radiation therapy with expected posttreatment changes in. No abnormal clusters of microcalcifications.\par \par She presents today for evaluation and imaging review.

## 2021-04-16 NOTE — ASSESSMENT
[FreeTextEntry1] : CORY is a david 61 year old patient who presented today in follow up for a history of left breast DCIS.  \par She has been doing well with no new breast related complaints. \par Imaging was done recently which revealed  1.3 x 1.1 x 0.4 cm hypoechoic mass in the medial left breast, probably benign.  Post left lumpectomy and radiation therapy with expected posttreatment changes in. No abnormal clusters of microcalcifications, as detailed above. \par Physical exam was unrevealing today.\par \par Imaging with a left unilateral diagnostic mammogram and sonogram will be ordered for June 2021, and that will be scheduled today. \par She will return for follow-up and clinical breast exam in August 2021 with Dr. Stevens.\par \par I spent a total of 20 minutes of face to face time with this patient, greater than 50% of which was spent in counseling and/or coordination of care.\par All of her questions were appropriately answered.\par She knows to call with any concerns.\par \par \par \par \par \par

## 2021-04-16 NOTE — PHYSICAL EXAM
[Normocephalic] : normocephalic [Atraumatic] : atraumatic [No Supraclavicular Adenopathy] : no supraclavicular adenopathy [No dominant masses] : no dominant masses in right breast  [No dominant masses] : no dominant masses left breast [No Nipple Discharge] : no left nipple discharge [No Rashes] : no rashes [No Ulceration] : no ulceration [Breast Nipple Retraction] : nipples not retracted [Breast Nipple Inversion] : nipples not inverted [de-identified] : No axillary lymphadenopathy appreciated. [de-identified] : well healed surgical scar.\par (+) RT changes. [de-identified] : No axillary lymphadenopathy appreciated.

## 2021-05-07 ENCOUNTER — APPOINTMENT (OUTPATIENT)
Dept: RADIATION ONCOLOGY | Facility: HOSPITAL | Age: 61
End: 2021-05-07
Payer: COMMERCIAL

## 2021-05-07 VITALS
OXYGEN SATURATION: 100 % | WEIGHT: 261.5 LBS | SYSTOLIC BLOOD PRESSURE: 159 MMHG | DIASTOLIC BLOOD PRESSURE: 76 MMHG | HEART RATE: 75 BPM | RESPIRATION RATE: 12 BRPM | BODY MASS INDEX: 44.89 KG/M2 | TEMPERATURE: 97.7 F

## 2021-05-07 PROCEDURE — 99213 OFFICE O/P EST LOW 20 MIN: CPT

## 2021-05-07 RX ORDER — FAMOTIDINE 40 MG/1
40 TABLET, FILM COATED ORAL
Refills: 0 | Status: DISCONTINUED | COMMUNITY
End: 2021-05-07

## 2021-05-07 RX ORDER — MAGNESIUM CARBONATE 250 MG
250 CAPSULE ORAL
Refills: 0 | Status: DISCONTINUED | COMMUNITY
End: 2021-05-07

## 2021-05-07 NOTE — PHYSICAL EXAM
[Sclera] : the sclera and conjunctiva were normal [Heart Rate And Rhythm] : heart rate and rhythm were normal [Heart Sounds] : normal S1 and S2 [Nondistended] : nondistended [Abdomen Tenderness] : non-tender [Normal] : oriented to person, place and time, the affect was normal, the mood was normal and not anxious [de-identified] : Bilat LE edema, trace [de-identified] : She is examined in both the upright and supine positions.  The right breast is unremarkable.   The left breast has minimal residual edema.  Much improved from last visit.  No palpable mass in either breast.

## 2021-05-07 NOTE — HISTORY OF PRESENT ILLNESS
[FreeTextEntry1] :  \par CORY RECIO  returns to clinic in follow up visit.  As you know, CORY RECIO is a 61 year old female with Intermediate grade DCIS of the left breast, ER/IN positive, [id number], Stage 0. She is s/p breast conserving surgery.  She received 5240cGy to the left breast from 3/16/2020 through 4/6/2020 without any complications.  Her last visit was in Nov. 2020. In the interim, she has done well. She is on  Anastrazole with moderate join pain. She has discussed it with Dr. Joseph.  Also, she has changed her diet with good improvement, managed by functional medicine.    She was recently seen by Dr. Stevens(4/16/2021) and imaging of left breast diagnostic mammogram and sonogram ordered for June 14, 2021.  She has slight breast discomfort "nerve" twinge with mild pruritus at times at breast skin folds.  \par \par On 12/14/2020 B/L mammo/US Impression:\par 1.3 x 1.1 x 0.4 cm hypoechoic mass in the medial left breast, probably benign.\par Post left lumpectomy and radiation therapy with expected posttreatment changes in. No abnormal clusters of microcalcifications\par Recommendation: Follow-up unilateral diagnostic mammogram and left ultrasound in 6 months.\par BI-RADS category 3: Probably Benign\par \par Dr. Stevens 8/17/2021  \par Dr. Joseph 7/30/2021.

## 2021-05-07 NOTE — LETTER CLOSING
[Consult Closing:] : Thank you for allowing me to participate in the care of this patient.  If you have any questions, please do not hesitate to contact me. [Sincerely yours,] : Sincerely yours, [FreeTextEntry3] : Meliza Oswald M.D. \par \par Electronically proofread and signed by:  Meliza Oswald MD\par Attending, Department of Radiation Medicine\par [facility]\par \par CC: Dr. Joseph

## 2021-05-07 NOTE — REVIEW OF SYSTEMS
[Fatigue] : fatigue [Lower Ext Edema] : lower extremity edema [Joint Pain] : joint pain [Negative] : Psychiatric [Chest Pain] : no chest pain [Palpitations] : no palpitations [Shortness Of Breath] : no shortness of breath [Wheezing] : no wheezing [Cough] : no cough [Skin Wound] : no skin wound

## 2021-05-27 PROBLEM — Z85.820: Status: RESOLVED | Noted: 2021-05-27 | Resolved: 2021-05-27

## 2021-05-27 NOTE — LETTER CLOSING
[Consult Closing:] : Thank you for allowing me to participate in the care of this patient.  If you have any questions, please do not hesitate to contact me. [Sincerely yours,] : Sincerely yours, [FreeTextEntry3] : Meliza Oswald M.D. \par \par Electronically proofread and signed by:  Meliza Oswald MD\par Attending, Department of Radiation Medicine\par [facility]\par \par CC: Dr. Joseph

## 2021-05-27 NOTE — HISTORY OF PRESENT ILLNESS
[FreeTextEntry1] : The patient is a 59 year  woman who was recently noted on screening mammogram (12/12/19) to have calcifications in the UOQ of the left breast.   Diagnostic mammogram and ultrasound on 12/20/19 showed segmental calcifications in the UOQ of the left breast spanning over about 5 cm.  On 12/24/19, she had a biopsy of the left breast UOQ abnormality and pathology showed intermediate grade DCIS with comedo necrosis.   It was ER/IA positive.  She also had an MRI of bilateral breasts on 1/15/2020, which showed just the previously noted malignancy on the left breast.  On 1/27/2020, she underwent a left breast localized excision.  She was found to have widespread DCIS, micropapillary and comedo types associated with calcifications, intermediated nuclear grade and present in 21/35 slides.  Surgical margins were negative.  She has been doing well since surgery.  She  is here to discuss radiation. \par Med/Onc: Dr. Joseph - eligible for endocrine therapy\par \par (+) family hx - breast cancer:\par maternal aunt (70s)\par maternal first cousin (50s)\par paternal first cousin (50s)\par *No genetic testing performed in the family.\par \par \par

## 2021-05-27 NOTE — OB/GYN HISTORY
[History of Birth Control Pills] : Patient has a history of taking birth control pills [History of Hormone Replacement Therapy] : no history of hormone replacement therapy [Currently In Menopause] : currently in menopause [Menopause Age: ____] : patient was [unfilled] years old at menopause [___] : Total Pregnancies: [unfilled]

## 2021-05-27 NOTE — REVIEW OF SYSTEMS
[Negative] : Allergic/Immunologic [Patient Intake Form Reviewed] : Patient intake form was reviewed [FreeTextEntry2] : fatigue [FreeTextEntry5] : fatigue, pain in legs [de-identified] : thyroid, joint replacement [FreeTextEntry7] : diarrhea

## 2021-05-27 NOTE — DISEASE MANAGEMENT
[Pathological] : TNM Stage: p [0] : 0 [FreeTextEntry4] : Intermediate grade DCIS of the left breast, ER/NC positive.   [TTNM] : is [NTNM] : 0 [MTNM] : 0

## 2021-05-27 NOTE — PHYSICAL EXAM
[Heart Sounds] : normal S1 and S2 [Heart Rate And Rhythm] : heart rate and rhythm were normal [Normal] : oriented to person, place and time, the affect was normal, the mood was normal and not anxious [de-identified] : She is examined in both the upright and supine positions.  The right breast is unremarkable.   The left breast has a well healed UOQ scar.  There is some ecchymosis in the left LIQ. No infection.   No palpable mass in either breast.

## 2021-06-14 ENCOUNTER — RESULT REVIEW (OUTPATIENT)
Age: 61
End: 2021-06-14

## 2021-06-14 ENCOUNTER — NON-APPOINTMENT (OUTPATIENT)
Age: 61
End: 2021-06-14

## 2021-06-14 ENCOUNTER — OUTPATIENT (OUTPATIENT)
Dept: OUTPATIENT SERVICES | Facility: HOSPITAL | Age: 61
LOS: 1 days | Discharge: HOME | End: 2021-06-14
Payer: COMMERCIAL

## 2021-06-14 DIAGNOSIS — Z98.890 OTHER SPECIFIED POSTPROCEDURAL STATES: Chronic | ICD-10-CM

## 2021-06-14 DIAGNOSIS — Z96.652 PRESENCE OF LEFT ARTIFICIAL KNEE JOINT: Chronic | ICD-10-CM

## 2021-06-14 DIAGNOSIS — Z90.49 ACQUIRED ABSENCE OF OTHER SPECIFIED PARTS OF DIGESTIVE TRACT: Chronic | ICD-10-CM

## 2021-06-14 DIAGNOSIS — Z90.710 ACQUIRED ABSENCE OF BOTH CERVIX AND UTERUS: Chronic | ICD-10-CM

## 2021-06-14 DIAGNOSIS — Z12.31 ENCOUNTER FOR SCREENING MAMMOGRAM FOR MALIGNANT NEOPLASM OF BREAST: ICD-10-CM

## 2021-06-14 PROCEDURE — G0279: CPT | Mod: 26

## 2021-06-14 PROCEDURE — 77065 DX MAMMO INCL CAD UNI: CPT | Mod: 26,LT

## 2021-06-14 PROCEDURE — 76642 ULTRASOUND BREAST LIMITED: CPT | Mod: 26,LT

## 2021-06-17 ENCOUNTER — APPOINTMENT (OUTPATIENT)
Dept: PLASTIC SURGERY | Facility: CLINIC | Age: 61
End: 2021-06-17
Payer: COMMERCIAL

## 2021-06-17 PROCEDURE — 99072 ADDL SUPL MATRL&STAF TM PHE: CPT

## 2021-06-17 PROCEDURE — 99212 OFFICE O/P EST SF 10 MIN: CPT

## 2021-06-17 NOTE — ASSESSMENT
[FreeTextEntry1] : as above\par pt w left posterior shoulder lesion near bra strap that is uncomfortable--?inflamed nevus approx 2mm in diameter, pale centrally w mild peripheral darker pigmentation\par \par suggested and performed excision of lesion \par done in office today\par \par Regarding the procedure, we discussed scarring, poor wound healing, bleeding, infection, need for additional surgery, and dissatisfaction with the outcome.  Also discussed possibility of keloid and/or hypertrophic scar formation as well as recurrence.  All questions were answered and risks understood.\par

## 2021-06-17 NOTE — PROCEDURE
[FreeTextEntry6] : Patient is a 58 year old female with a right forearm lipoma measuring approximately 2 cm.  \par \par The area was prepped and draped in the usual fashion.  Local anesthetic was administered using 1% lidocaine with epinephrine.\par \par The lipoma was sharply excised.  Area was irrigated copiously.  Complex wound closure was performed in layers.  The wound measured approximately 3  cm.\par \par Sterile dressing applied.  \par \par Patient tolerated procedure well and understands post-op instructions.\par \par Sutures Used:   3-0 nylon\par \par \par

## 2021-06-24 ENCOUNTER — TRANSCRIPTION ENCOUNTER (OUTPATIENT)
Age: 61
End: 2021-06-24

## 2021-06-30 ENCOUNTER — EMERGENCY (EMERGENCY)
Facility: HOSPITAL | Age: 61
LOS: 0 days | Discharge: HOME | End: 2021-06-30
Attending: STUDENT IN AN ORGANIZED HEALTH CARE EDUCATION/TRAINING PROGRAM | Admitting: STUDENT IN AN ORGANIZED HEALTH CARE EDUCATION/TRAINING PROGRAM
Payer: COMMERCIAL

## 2021-06-30 VITALS
SYSTOLIC BLOOD PRESSURE: 150 MMHG | DIASTOLIC BLOOD PRESSURE: 71 MMHG | WEIGHT: 250 LBS | RESPIRATION RATE: 17 BRPM | OXYGEN SATURATION: 97 % | HEIGHT: 64 IN | TEMPERATURE: 98 F | HEART RATE: 75 BPM

## 2021-06-30 DIAGNOSIS — K21.9 GASTRO-ESOPHAGEAL REFLUX DISEASE WITHOUT ESOPHAGITIS: ICD-10-CM

## 2021-06-30 DIAGNOSIS — Z87.19 PERSONAL HISTORY OF OTHER DISEASES OF THE DIGESTIVE SYSTEM: ICD-10-CM

## 2021-06-30 DIAGNOSIS — Z85.3 PERSONAL HISTORY OF MALIGNANT NEOPLASM OF BREAST: ICD-10-CM

## 2021-06-30 DIAGNOSIS — Z98.890 OTHER SPECIFIED POSTPROCEDURAL STATES: ICD-10-CM

## 2021-06-30 DIAGNOSIS — Z79.82 LONG TERM (CURRENT) USE OF ASPIRIN: ICD-10-CM

## 2021-06-30 DIAGNOSIS — Z98.890 OTHER SPECIFIED POSTPROCEDURAL STATES: Chronic | ICD-10-CM

## 2021-06-30 DIAGNOSIS — S61.210A LACERATION WITHOUT FOREIGN BODY OF RIGHT INDEX FINGER WITHOUT DAMAGE TO NAIL, INITIAL ENCOUNTER: ICD-10-CM

## 2021-06-30 DIAGNOSIS — Z88.8 ALLERGY STATUS TO OTHER DRUGS, MEDICAMENTS AND BIOLOGICAL SUBSTANCES: ICD-10-CM

## 2021-06-30 DIAGNOSIS — Z87.39 PERSONAL HISTORY OF OTHER DISEASES OF THE MUSCULOSKELETAL SYSTEM AND CONNECTIVE TISSUE: ICD-10-CM

## 2021-06-30 DIAGNOSIS — E03.9 HYPOTHYROIDISM, UNSPECIFIED: ICD-10-CM

## 2021-06-30 DIAGNOSIS — I10 ESSENTIAL (PRIMARY) HYPERTENSION: ICD-10-CM

## 2021-06-30 DIAGNOSIS — Z90.710 ACQUIRED ABSENCE OF BOTH CERVIX AND UTERUS: Chronic | ICD-10-CM

## 2021-06-30 DIAGNOSIS — Z86.69 PERSONAL HISTORY OF OTHER DISEASES OF THE NERVOUS SYSTEM AND SENSE ORGANS: ICD-10-CM
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
## 2021-06-30 DIAGNOSIS — Z96.652 PRESENCE OF LEFT ARTIFICIAL KNEE JOINT: Chronic | ICD-10-CM

## 2021-06-30 DIAGNOSIS — Z91.040 LATEX ALLERGY STATUS: ICD-10-CM

## 2021-06-30 DIAGNOSIS — Z90.49 ACQUIRED ABSENCE OF OTHER SPECIFIED PARTS OF DIGESTIVE TRACT: Chronic | ICD-10-CM

## 2021-06-30 DIAGNOSIS — Z79.899 OTHER LONG TERM (CURRENT) DRUG THERAPY: ICD-10-CM

## 2021-06-30 DIAGNOSIS — Z85.820 PERSONAL HISTORY OF MALIGNANT MELANOMA OF SKIN: ICD-10-CM

## 2021-06-30 DIAGNOSIS — Z79.890 HORMONE REPLACEMENT THERAPY: ICD-10-CM
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
## 2021-06-30 DIAGNOSIS — Z87.09 PERSONAL HISTORY OF OTHER DISEASES OF THE RESPIRATORY SYSTEM: ICD-10-CM

## 2021-06-30 PROCEDURE — 99283 EMERGENCY DEPT VISIT LOW MDM: CPT

## 2021-06-30 NOTE — ED ADULT NURSE NOTE - OBJECTIVE STATEMENT
Pt reports slicing right pointer finger this morning when cleaning a new knife. Small laceration to top of finger. No pain noted per pt. Bleeding has stopped upon assessment.

## 2021-06-30 NOTE — ED PROVIDER NOTE - OBJECTIVE STATEMENT
sustained laceration to right index finger this am from knife. Denies dec sensation or dec ROM. Pt UTD with tetanus

## 2021-06-30 NOTE — ED PROVIDER NOTE - PATIENT PORTAL LINK FT
You can access the FollowMyHealth Patient Portal offered by [facility] by registering at the following website: [email or web address]. By joining [facility]’s FollowMyHealth portal, you will also be able to view your health information using other applications (apps) compatible with our system.

## 2021-06-30 NOTE — ED ADULT TRIAGE NOTE - PRO INTERPRETER NEED 2
English

## 2021-06-30 NOTE — ED ADULT NURSE REASSESSMENT NOTE - NS ED NURSE REASSESS COMMENT FT1
patient denies discomfort at this time. questions and concerns addressed. alert and oriented x4. steady gait.

## 2021-06-30 NOTE — ED PROVIDER NOTE - ATTENDING CONTRIBUTION TO CARE
61 yr old f w/ a pmh significant for HIV (HAART), cad s/p stent, htn, dm, hld who presents with R hand pain. Pt is L handed. Pt states that he recently purchased a scooter and noted pain at the base of the 4th digit. Pt denies any trauma to the area or any other complaints.     VITAL SIGNS: I have reviewed nursing notes and confirm.  CONSTITUTIONAL: non-toxic, well appearing  SKIN: no rash, no petechiae.  EYES: EOMI, pink conjunctiva, anicteric  ENT: tongue midline, no exudates, MMM  NECK: Supple; no meningismus, no JVD  RESP: no respiratory distress  EXT: Normal ROM x4. No edema. No calves tenderness. RUE: sensation intact (radial/ulnar/medial), radial pulse +2  NEURO: Alert, oriented. CN2-12 intact, equal strength bilaterally, nl gait.  PSYCH: Cooperative, appropriate.    a/p  69 yr old m that presents with hand pain. concern for trigger finger. will dc pt with orthopedics follow up and strict return precautions. 61 yr old f w/ a pmh significant for htn, hypothyroidism, breast cancer s/p lumpectomy who presents with an abrasion to the R index finger. Pt states that she was using a knife this morning when she sustained the abrasion. Pt denies any other medical complaints. Pt UTD with tetanus.     VITAL SIGNS: I have reviewed nursing notes and confirm.  CONSTITUTIONAL: non-toxic, well appearing  SKIN: no rash, no petechiae.  EYES: EOMI, pink conjunctiva, anicteric  ENT: tongue midline, no exudates, MMM  NECK: Supple; no meningismus, no JVD  RESP: no respiratory distress  EXT: Normal ROM x4. No edema. No calves tenderness. R index finger with 1 cm linear non bleeding well approximated abrasion to volar distal tip fat pad, no foreign body. sensation and motor intact   NEURO: Alert, oriented. CN2-12 intact, equal strength bilaterally, nl gait.  PSYCH: Cooperative, appropriate.    a/p  61 yr old f that presents s/p abrasion. area cleaned bandaged. pt given wound precautions. pt discharged with pcp follow up and strict return precautions.

## 2021-06-30 NOTE — ED PROVIDER NOTE - PHYSICAL EXAMINATION
CONST: Well appearing in NAD  MS: right index finger with 1 cm linear non bleeding well approximated laceration to volar distal tip fat pad/ No NV injury  SKIN: Warm, dry, no acute rashes. Good turgor  NEURO: A&Ox3, No focal deficits. Strength 5/5 with no sensory deficits. Steady gait

## 2021-06-30 NOTE — ED ADULT NURSE NOTE - NSIMPLEMENTINTERV_GEN_ALL_ED
Implemented All Universal Safety Interventions:  [place] to call system. Call bell, personal items and telephone within reach. Instruct patient to call for assistance. Room bathroom lighting operational. Non-slip footwear when patient is off stretcher. Physically safe environment: no spills, clutter or unnecessary equipment. Stretcher in lowest position, wheels locked, appropriate side rails in place.

## 2021-07-01 ENCOUNTER — APPOINTMENT (OUTPATIENT)
Dept: PLASTIC SURGERY | Facility: CLINIC | Age: 61
End: 2021-07-01
Payer: COMMERCIAL

## 2021-07-01 DIAGNOSIS — D23.5 OTHER BENIGN NEOPLASM OF SKIN OF TRUNK: ICD-10-CM

## 2021-07-01 LAB — CORE LAB BIOPSY: NORMAL

## 2021-07-01 PROCEDURE — 99212 OFFICE O/P EST SF 10 MIN: CPT

## 2021-07-01 PROCEDURE — 99072 ADDL SUPL MATRL&STAF TM PHE: CPT

## 2021-07-01 NOTE — DATA REVIEWED
[FreeTextEntry1] : \par  Tissue Biopsy             Final\par \par No Documents Attached\par \par \par \par \par   CORY RECIO                   2\par \par \par \par                  Surgical Final Report\par \par \par \par \par           Final Diagnosis\par           Left posterior shoulder skin lesion:\par           - Dermatofibroma (benign), (see Comment).\par \par           Comment: Complete excision of the tumor is seen in this material.\par \par           The immunostain profile supports the diagnosis. 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.\par \par           The tumor is negative for CD34, S-100 and Mart-1.\par \par           Verified by: Marlene Manning M.D.\par           (Electronic Signature)\par           Reported on: 06/23/21 17:47 EDT, [address] [place] [id number]\par           Phone: [phone]   Fax: [phone]\par           _________________________________________________________________\par \par           Clinical History\par           Left posterior shoulder skin lesion\par \par           Specimen(s) Submitted\par           Left posterior shoulder skin lesion\par \par           Gross Description\par           The specimen is received in formalin, labeled "left posterior\par           shoulder skin lesion" and consists of an unoriented tan skin\par           ellipse, with underlying soft tissue, measuring 1.2 x 0.8 x 0.4\par           cm. There is a white tan lesion at the center of the skin,\par           measuring 0.8 x 0.5 cm, and 0.1 cm away from the all surgical\par           margins. The deep margin is inked black. Serial section reveals a\par           white tan cut surface. The specimen is submitted entirely. (1\par           block)\par \par           Specimen was received and underwent gross examination at [facility], [address] [place] [address].\par \par           06/18/2021 13:11:27 EDT mt\par \par           Perioperative Diagnosis\par           D48.5 - Neoplasm of uncertain behavior of skin\par \par \par \par \par \par \par \par \par \par           CORY RECIO                   2\par \par \par \par                  Surgical Final Report\par \par \par \par \par           Disclaimer\par           Immunohistochemical studies were performed at [facility], [address] [place] [address] (see NOTE).\par \par           Note:  All controls show appropriate reactivity.\par \par           This test was developed and its performance characteristics\par           determined by [facility],par           [address] [place] [address], [place]  [address] It has not been cleared or\par           approved by the U.S.  Food and Drug Administration.  The FDA has\par           determined that such clearance or approval is not necessary.\par           This test is used for clinical purposes.  It should not be\par           regarded as investigational or for research.  This assay has not\par           been validated for decalcified tissues.  Results should be\par           interpreted with caution given the likelihood of false negativity\par           on decalcified specimens.  The laboratory is regulated under the\par           Clinical Laboratory Improvement Amendments of 1988 (CLIA) as\par           qualified to perform high complexity clinical testing.\par \par \par                 Surgical Consult Report\par \par \par \par \par           Disclaimer\par           Immunohistochemical studies were performed at [facility], [address] [place] [address] (see NOTE).\par \par           Note:  All controls show appropriate reactivity.\par \par           This test was developed and its performance characteristics\par           determined by [facility],\par           [address] [place] [address], [place]  [address] It has not been cleared or\par           approved by the U.S.  Food and Drug Administration.  The FDA has\par           determined that such clearance or approval is not necessary.\par           This test is used for clinical purposes.  It should not be\par           regarded as investigational or for research.  This assay has not\par           been validated for decalcified tissues.  Results should be\par           interpreted with caution given the likelihood of false negativity\par           on decalcified specimens.  The laboratory is regulated under the\par           Clinical Laboratory Improvement Amendments of 1988 (CLIA) as\par           qualified to perform high complexity clinical testing.\par \par  \par \par  Ordered by: JUAN C OWENS IV       Collected/Examined: 17Jun2021 12:27PM       \par Verification Required       Stage: Final       \par  Performed at: [facility] (Med Director: Mike Simon M.D)       Resulted: 23Jun2021 05:47PM       Last Updated: 23Jun2021 05:47PM       Accession: [id number]

## 2021-07-01 NOTE — ASSESSMENT
[FreeTextEntry1] : 60 yo F 2 weeks s/p excision of left posterior shoulder skin lesion. Doing well. Pathology reveals dermatofibroma. \par \par - sutures removed, steristrip applied\par - daily Aquaphor \par - pathology discussed\par - sunblock\par - f/u PRN

## 2021-07-01 NOTE — PHYSICAL EXAM
[de-identified] : NAD [de-identified] : right forearm excision site healed, no HTS or keloid  [de-identified] : Left shoulder incision healing well, c/d/i, no erythema, minimal swelling, no cellulitis of fluid collection

## 2021-07-01 NOTE — HISTORY OF PRESENT ILLNESS
[FreeTextEntry1] : 60 yo F with h/o left thigh melanoma s/p complete excision and right forearm lipoma. Patient is doing well and denies any fever, chills or bleeding from the site. \par \par Interval hx (7/1/21). Patient presents today 2 weeks s/p excision of left shoulder skin lesion. Doing well with no significant pain, f/c or drainage.

## 2021-07-09 NOTE — ED PROVIDER NOTE - INTERPRETATION
Anesthesia Pre Eval Note    Anesthesia ROS/Med Hx        Anesthetic Complication History:  Patient does not have a history of anesthetic complications      Pulmonary Review:  Patient does not have a pulmonary history      Neuro/Psych Review:  Patient does not have a neuro/psych history       Cardiovascular Review:    Positive for hypertension    GI/HEPATIC/RENAL Review:  Patient does not have a GI/hepatic/renalhistory       End/Other Review:    Positive for cancer      Relevant Problems   No relevant active problems       Physical Exam     Airway   Mallampati: II  TM Distance: >3 FB  Neck ROM: Full  Neck: Non-tender and Able to place in sniff position  TMJ Mobility: Good    Cardiovascular  Cardiovascular exam normal  Cardio Rhythm: Regular  Cardio Rate: Normal    Head Assessment  Head assessment: Normocephalic and Atraumatic    General Assessment  General Assessment: Alert and oriented and No acute distress    Dental Exam  Dental exam normal    Pulmonary Exam  Pulmonary exam normal  Breath sounds clear to auscultation:  Yes    Abdominal Exam  Abdominal exam normal      Anesthesia Plan:  Anesthesia Plan    ASA Status: 2    Anesthesia Type: General    Induction: Intravenous  Preferred Airway Type: LMA  Maintenance: Inhalational      Post-op Pain Management: Per Surgeon      Checklist  Reviewed: NPO Status, Allergies, Medications, Problem list and Past Med History  Consent/Risks Discussed Statement:  The proposed anesthetic plan, including its risks and benefits, have been discussed with the Patient along with the risks and benefits of alternatives. Questions were encouraged and answered and the patient and/or representative understands and agrees to proceed.        I discussed with the patient (and/or patient's legal representative) the risks and benefits of the proposed anesthesia plan, General, which may include services performed by other anesthesia providers.    Alternative anesthesia plans, if available, were  reviewed with the patient (and/or patient's legal representative). Discussion has been held with the patient (and/or patient's legal representative) regarding risks of anesthesia, which include vomiting, nausea and dental injury and emergent situations that may require change in anesthesia plan.    The patient (and/or patient's legal representative) has indicated understanding, his/her questions have been answered, and he/she wishes to proceed with the planned anesthetic.    Blood Products: Not Anticipated     normal sinus rhythm

## 2021-08-07 ENCOUNTER — TRANSCRIPTION ENCOUNTER (OUTPATIENT)
Age: 61
End: 2021-08-07

## 2021-08-23 ENCOUNTER — APPOINTMENT (OUTPATIENT)
Dept: HEMATOLOGY ONCOLOGY | Facility: CLINIC | Age: 61
End: 2021-08-23
Payer: COMMERCIAL

## 2021-08-23 ENCOUNTER — OUTPATIENT (OUTPATIENT)
Dept: OUTPATIENT SERVICES | Facility: HOSPITAL | Age: 61
LOS: 1 days | Discharge: HOME | End: 2021-08-23

## 2021-08-23 VITALS
HEIGHT: 64 IN | HEART RATE: 69 BPM | WEIGHT: 252 LBS | BODY MASS INDEX: 43.02 KG/M2 | DIASTOLIC BLOOD PRESSURE: 79 MMHG | TEMPERATURE: 97.4 F | SYSTOLIC BLOOD PRESSURE: 160 MMHG | RESPIRATION RATE: 14 BRPM

## 2021-08-23 DIAGNOSIS — Z90.49 ACQUIRED ABSENCE OF OTHER SPECIFIED PARTS OF DIGESTIVE TRACT: Chronic | ICD-10-CM

## 2021-08-23 DIAGNOSIS — Z96.652 PRESENCE OF LEFT ARTIFICIAL KNEE JOINT: Chronic | ICD-10-CM

## 2021-08-23 DIAGNOSIS — Z90.710 ACQUIRED ABSENCE OF BOTH CERVIX AND UTERUS: Chronic | ICD-10-CM

## 2021-08-23 DIAGNOSIS — Z98.890 OTHER SPECIFIED POSTPROCEDURAL STATES: Chronic | ICD-10-CM

## 2021-08-23 PROCEDURE — 99214 OFFICE O/P EST MOD 30 MIN: CPT

## 2021-08-23 NOTE — PHYSICAL EXAM
[Fully active, able to carry on all pre-disease performance without restriction] : Status 0 - Fully active, able to carry on all pre-disease performance without restriction [Normal] : affect appropriate [de-identified] : Status post left breast lumpectomy. Surgical scar is healing well. There is no palpable abnormality.

## 2021-08-23 NOTE — ASSESSMENT
[FreeTextEntry1] : 58 yo female has left breast breast DCIS, intermediate nuclear grade, ER/ME positive, s/p lumpectomy with negative margins. \par \par Recommendation:\par -- Continue Anastrozole daily, calcium and vitamin D supplement. Anastrozole e-refilled.\par -- Breast exam did not reveal palpable abnormality. Skin itching under breast is likely due to heat and fungal infection. She is given Ketoconazole cream to apply to affected area. She will have b/l dx mammo in 12/2021.\par -- Reviewed bone density report. Encourage regular exercise. A referral is given for repeat bone density.\par -- Followup with Dr. Stevens and Dr. Oswald as indicated.\par -- Followup with PCP for health maintenance. \par -- RTO for followup in 6 months.\par \par

## 2021-08-23 NOTE — HISTORY OF PRESENT ILLNESS
[de-identified] : 60 yo female is referred by Dr. Stevens for consultation of adjuvant endocrine therapy. The patient had a screening mammogram on 12/12/19 which showed mass and calcifications in the left breast requiring additional evaluation. On 12/20/19, left breast dx mammo and US showed segmental calcifications in the UOQ of the left breast spanning over about 5 cm. The previously noted apparent mass persisted on spot compression views and corresponded with a benign cyst seen on concurrent US.\par \par On 12/24/19, stereotactic core biopsy of LUOQ, Posterior and anterior calcifications, Calcifications span 5 CM both revealed DCIS solid and cribriform types with comedo necrosis and calcifications, intermediate nuclear grade. \par Estrogen receptor positive, 95%\par Progesterone receptor positive, 75%\par \par Her family history is significant for her maternal aunt with breast cancer in her 70's; her maternal first cousin with breast cancer in her 50's; her paternal first cousin with breast cancer in her 50's. 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 No genetic testing in the patient or her family. \par \par On 1/27/2020, she underwent Left NLOC. The pathology revealed 2 healing prior biopsy sites with widespread DCIS, micropapillary and comedo types associated with calcifications, intermediated nuclear grade and present in 21/35 slides. The inferiore margin was involved and lateral margin was 1.0 mm. Reexcision of left inferior and lateral margins were negative. \par \par The patient recovered well from surgery. She is here today to discuss adjuvant endocrine therapy.\par \par She had RATLH/BSO for persistent PMB in 11/2019. The pathology revealed benign endometrial polyps, corpus uteri showing intramural leiomyomas and ovaries without significant pathologic change.\par  [de-identified] : 7/27/2020:\par The patient is here for followup visit. She was diagnosed with  left breast breast DCIS, intermediate nuclear grade, ER/OH positive, s/p lumpectomy with negative margins. She received adjuvant WBI with 5240cGy to the left breast from 3/16/2020 through 4/6/2020 without any complications. She has been taking Anastrozole daily since 4/2020 and tolerated well. She had bone density in 2/2020 which was within normal limit. She does not have breast related complains. \par \par 1/28/2021:\par The patient is here for followup visit. She was diagnosed with  left breast breast DCIS, intermediate nuclear grade, ER/OH positive, s/p lumpectomy on 1/27/2020 with negative margins. She received adjuvant WBI with 5240cGy to the left breast from 3/16/2020 through 4/6/2020 without any complications. She has been taking Anastrozole daily since 4/2020 and tolerated well. She had bone density in 2/2020 which was within normal limit. She does not have breast related complains. On 12/14/2020, she had b/l dx mammo and US which showed post left lumpectomy and radiation therapy with expected posttreatment changes in. No abnormal clusters of microcalcifications. Recommendation: Follow-up unilateral diagnostic mammogram and left ultrasound in 6 months.  Bone Density from 2/19/20 was normal.\par \par 8/23/21:\par The patient is here for followup visit. She was diagnosed with  left breast breast DCIS, intermediate nuclear grade, ER/OH positive, s/p lumpectomy on 1/27/2020 with negative margins. She received adjuvant WBI with 5240cGy to the left breast from 3/16/2020 through 4/6/2020. She has been taking Anastrozole daily since 4/2020 and tolerated well. She had bone density in 2/2020 which was within normal limit. On 6/14/21, she had left breast dx mammo and US which showed stable architectural distortion most consistent with postsurgical change/fat necrosis. Bone Density from 2/19/20 was normal. She complains feeling itching under her both breast. \par

## 2021-08-24 DIAGNOSIS — D05.12 INTRADUCTAL CARCINOMA IN SITU OF LEFT BREAST: ICD-10-CM

## 2021-08-24 DIAGNOSIS — Z79.811 LONG TERM (CURRENT) USE OF AROMATASE INHIBITORS: ICD-10-CM

## 2021-08-24 DIAGNOSIS — R21 RASH AND OTHER NONSPECIFIC SKIN ERUPTION: ICD-10-CM

## 2021-09-02 ENCOUNTER — APPOINTMENT (OUTPATIENT)
Dept: BREAST CENTER | Facility: CLINIC | Age: 61
End: 2021-09-02
Payer: COMMERCIAL

## 2021-09-02 PROCEDURE — 99213 OFFICE O/P EST LOW 20 MIN: CPT

## 2021-09-02 NOTE — ASSESSMENT
[FreeTextEntry1] : CORY is a david 61 year old patient who presented today in follow up for a history of left breast DCIS. \par She has been doing well with no new breast related complaints. \par Imaging of the left breast was done on 06/14/2021, which revealed The patient is status post a left lumpectomy with stable architectural distortion most consistent with postsurgical change. Again identified at the lumpectomy site at the 1 to 2:00 location 8 cm from the nipple is an oval circumscribed hyperechoic mass which is without significant change measuring 1.4 cm x 4 cm x 0.8 cm consistent with fat necrosis. The previously identified oval circumscribed hypoechoic mass at the 9:00 location 10 to 11 cm from the nipple is no longer identified most consistent with a resolved complicated cyst/sebaceous cyst, as detailed above. \par Physical exam was unrevealing today.\par \par Imaging with a bilateral diagnostic mammogram will be ordered for December 2021, \par and that will be scheduled today. \par She will return for follow-up and clinical breast exam following imaging. \par \par The patient was informed that Dr. Basia Stevens will no longer be practicing here as of the end of August 2021; her care will be continued with the practice.\par \par I spent a total of 20 minutes of face to face time with this patient, greater than 50% of which was spent in counseling and/or coordination of care.\par All of her questions were appropriately answered.\par She knows to call with any concerns.

## 2021-09-02 NOTE — DATA REVIEWED
[FreeTextEntry1] : Left Uni Dx Mammo & Sono - 06/14/2021:\par Breast composition:There are scattered areas of fibroglandular density.\par \par The patient is status post a left lumpectomy with stable architectural distortion most consistent with postsurgical change.  No suspicious masses or abnormal clusters of microcalcifications are seen.\par \par Targeted Left Breast Sonogram:\par \par Again identified at the lumpectomy site at the 1 to 2:00 location 8 cm from the nipple is an oval circumscribed hyperechoic mass which is without significant change measuring 1.4 cm x 4 cm x 0.8 cm consistent with fat necrosis.\par \par The previously identified oval circumscribed hypoechoic mass at the 9:00 location 10 to 11 cm from the nipple is no longer identified most consistent with a resolved complicated cyst/sebaceous cyst.\par \par Impression: Status post a left lumpectomy with stable architectural distortion most consistent with postsurgical change/fat necrosis as above.\par \par No evidence of malignancy.\par \par Recommendation: As per post lumpectomy routine, a follow-up bilateral mammogram is recommended.\par \par BI-RADS Category 2: Benign

## 2021-09-02 NOTE — HISTORY OF PRESENT ILLNESS
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  \par Estrogen receptor positive, 95\par Progesterone receptor positive, 75\par \par Left breast DCIS, micropapillary and solid types with comedo necrosis and calcifications, intermediate nuclear grade on stereotactic core biopsy 12/24/19; LUOQ, Anterior calcifications, Calcifications span 5 CM (tophat). \par Estrogen receptor positive, 95\par Progesterone receptor positive, 75\par \par No prior complaints related to the breasts. \par Her family history is significant for her maternal aunt with breast cancer in her 70's; her maternal first cousin with breast cancer in her 50's; her paternal first cousin with breast cancer in her 50's. 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  No genetic testing in the patient or her family.  \par \par Status post Left NLOC 1/27/20.  \par \par CORY RECIO is a 61 year old female patient who presents today in follow up for left breast DCIS.\par Since her last visit, she has no new breast related complaints - she continues to have sporadic left breast pain. Imaging of the left breast was done on 06/14/2021, which revealed The patient is status post a left lumpectomy with stable architectural distortion most consistent with postsurgical change. Again identified at the lumpectomy site at the 1 to 2:00 location 8 cm from the nipple is an oval circumscribed hyperechoic mass which is without significant change measuring 1.4 cm x 4 cm x 0.8 cm consistent with fat necrosis. The previously identified oval circumscribed hypoechoic mass at the 9:00 location 10 to 11 cm from the nipple is no longer identified most consistent with a resolved complicated cyst/sebaceous cyst.\par \par She presents today for evaluation and imaging review.

## 2021-09-02 NOTE — PHYSICAL EXAM
[Normocephalic] : normocephalic [Atraumatic] : atraumatic [No Supraclavicular Adenopathy] : no supraclavicular adenopathy [No dominant masses] : no dominant masses in right breast  [No dominant masses] : no dominant masses left breast [No Nipple Discharge] : no left nipple discharge [No Rashes] : no rashes [No Ulceration] : no ulceration [Breast Nipple Inversion] : nipples not inverted [Breast Nipple Retraction] : nipples not retracted [de-identified] : well healed surgical scar.\par (+) RT changes.  [de-identified] : No axillary lymphadenopathy appreciated. [de-identified] : No axillary lymphadenopathy appreciated.

## 2021-09-03 ENCOUNTER — EMERGENCY (EMERGENCY)
Facility: HOSPITAL | Age: 61
LOS: 0 days | Discharge: HOME | End: 2021-09-04
Attending: EMERGENCY MEDICINE | Admitting: EMERGENCY MEDICINE
Payer: COMMERCIAL

## 2021-09-03 VITALS
HEIGHT: 64 IN | WEIGHT: 248.9 LBS | HEART RATE: 77 BPM | TEMPERATURE: 98 F | OXYGEN SATURATION: 99 % | DIASTOLIC BLOOD PRESSURE: 88 MMHG | RESPIRATION RATE: 18 BRPM | SYSTOLIC BLOOD PRESSURE: 196 MMHG

## 2021-09-03 DIAGNOSIS — Z87.39 PERSONAL HISTORY OF OTHER DISEASES OF THE MUSCULOSKELETAL SYSTEM AND CONNECTIVE TISSUE: ICD-10-CM

## 2021-09-03 DIAGNOSIS — I10 ESSENTIAL (PRIMARY) HYPERTENSION: ICD-10-CM

## 2021-09-03 DIAGNOSIS — Z86.69 PERSONAL HISTORY OF OTHER DISEASES OF THE NERVOUS SYSTEM AND SENSE ORGANS: ICD-10-CM

## 2021-09-03 DIAGNOSIS — Z79.890 HORMONE REPLACEMENT THERAPY: ICD-10-CM

## 2021-09-03 DIAGNOSIS — R07.89 OTHER CHEST PAIN: ICD-10-CM

## 2021-09-03 DIAGNOSIS — K21.9 GASTRO-ESOPHAGEAL REFLUX DISEASE WITHOUT ESOPHAGITIS: ICD-10-CM

## 2021-09-03 DIAGNOSIS — Z90.49 ACQUIRED ABSENCE OF OTHER SPECIFIED PARTS OF DIGESTIVE TRACT: Chronic | ICD-10-CM

## 2021-09-03 DIAGNOSIS — Z91.040 LATEX ALLERGY STATUS: ICD-10-CM

## 2021-09-03 DIAGNOSIS — Z96.652 PRESENCE OF LEFT ARTIFICIAL KNEE JOINT: ICD-10-CM

## 2021-09-03 DIAGNOSIS — E03.9 HYPOTHYROIDISM, UNSPECIFIED: ICD-10-CM

## 2021-09-03 DIAGNOSIS — Z98.890 OTHER SPECIFIED POSTPROCEDURAL STATES: Chronic | ICD-10-CM

## 2021-09-03 DIAGNOSIS — Z87.09 PERSONAL HISTORY OF OTHER DISEASES OF THE RESPIRATORY SYSTEM: ICD-10-CM

## 2021-09-03 DIAGNOSIS — Z88.8 ALLERGY STATUS TO OTHER DRUGS, MEDICAMENTS AND BIOLOGICAL SUBSTANCES: ICD-10-CM

## 2021-09-03 DIAGNOSIS — Z90.89 ACQUIRED ABSENCE OF OTHER ORGANS: ICD-10-CM

## 2021-09-03 DIAGNOSIS — Z96.652 PRESENCE OF LEFT ARTIFICIAL KNEE JOINT: Chronic | ICD-10-CM

## 2021-09-03 DIAGNOSIS — Z79.899 OTHER LONG TERM (CURRENT) DRUG THERAPY: ICD-10-CM

## 2021-09-03 DIAGNOSIS — Z90.710 ACQUIRED ABSENCE OF BOTH CERVIX AND UTERUS: Chronic | ICD-10-CM

## 2021-09-03 DIAGNOSIS — Z87.19 PERSONAL HISTORY OF OTHER DISEASES OF THE DIGESTIVE SYSTEM: ICD-10-CM

## 2021-09-03 DIAGNOSIS — Z79.82 LONG TERM (CURRENT) USE OF ASPIRIN: ICD-10-CM

## 2021-09-03 LAB
BASOPHILS # BLD AUTO: 0.05 K/UL — SIGNIFICANT CHANGE UP (ref 0–0.2)
BASOPHILS NFR BLD AUTO: 0.8 % — SIGNIFICANT CHANGE UP (ref 0–1)
EOSINOPHIL # BLD AUTO: 0.16 K/UL — SIGNIFICANT CHANGE UP (ref 0–0.7)
EOSINOPHIL NFR BLD AUTO: 2.5 % — SIGNIFICANT CHANGE UP (ref 0–8)
HCT VFR BLD CALC: 35.4 % — LOW (ref 37–47)
HGB BLD-MCNC: 11.2 G/DL — LOW (ref 12–16)
IMM GRANULOCYTES NFR BLD AUTO: 0.3 % — SIGNIFICANT CHANGE UP (ref 0.1–0.3)
LYMPHOCYTES # BLD AUTO: 1.28 K/UL — SIGNIFICANT CHANGE UP (ref 1.2–3.4)
LYMPHOCYTES # BLD AUTO: 20.3 % — LOW (ref 20.5–51.1)
MCHC RBC-ENTMCNC: 20.7 PG — LOW (ref 27–31)
MCHC RBC-ENTMCNC: 31.6 G/DL — LOW (ref 32–37)
MCV RBC AUTO: 65.3 FL — LOW (ref 81–99)
MONOCYTES # BLD AUTO: 0.51 K/UL — SIGNIFICANT CHANGE UP (ref 0.1–0.6)
MONOCYTES NFR BLD AUTO: 8.1 % — SIGNIFICANT CHANGE UP (ref 1.7–9.3)
NEUTROPHILS # BLD AUTO: 4.28 K/UL — SIGNIFICANT CHANGE UP (ref 1.4–6.5)
NEUTROPHILS NFR BLD AUTO: 68 % — SIGNIFICANT CHANGE UP (ref 42.2–75.2)
NRBC # BLD: 0 /100 WBCS — SIGNIFICANT CHANGE UP (ref 0–0)
PLATELET # BLD AUTO: 237 K/UL — SIGNIFICANT CHANGE UP (ref 130–400)
RBC # BLD: 5.42 M/UL — HIGH (ref 4.2–5.4)
RBC # FLD: 16.9 % — HIGH (ref 11.5–14.5)
WBC # BLD: 6.3 K/UL — SIGNIFICANT CHANGE UP (ref 4.8–10.8)
WBC # FLD AUTO: 6.3 K/UL — SIGNIFICANT CHANGE UP (ref 4.8–10.8)

## 2021-09-03 PROCEDURE — 93010 ELECTROCARDIOGRAM REPORT: CPT

## 2021-09-03 PROCEDURE — 71045 X-RAY EXAM CHEST 1 VIEW: CPT | Mod: 26

## 2021-09-03 PROCEDURE — 99285 EMERGENCY DEPT VISIT HI MDM: CPT

## 2021-09-03 RX ORDER — SODIUM CHLORIDE 9 MG/ML
1000 INJECTION, SOLUTION INTRAVENOUS ONCE
Refills: 0 | Status: COMPLETED | OUTPATIENT
Start: 2021-09-03 | End: 2021-09-03

## 2021-09-03 NOTE — ED PROVIDER NOTE - ATTENDING CONTRIBUTION TO CARE
Cp, typical. neg ccta recently. sx resolved.   pt in nad, ctab, rrr, ab soft, nt.     ekg, labs, imaging, cardiac enzymes, reassess.

## 2021-09-03 NOTE — ED PROVIDER NOTE - OBJECTIVE STATEMENT
61 yold female to ED Pmhx Left breast ca s/p lumpectomy s/p RT, hypothyroidism, Thyroid ca s/p thyroidectomy, Left TKR, GB, [id number]; pt present to ED c/o Left side chest pain described as sharp lasting few seconds worse with palpation/movement possible related to painful breast exam yesterday; Pt denies n/v/sob, diaphoresis or worsening of cp with exertion; last cardiac workup 9/20 ccta nl; pt denies htn, dm, hld, smoker or fmhx cad <55;

## 2021-09-03 NOTE — ED ADULT NURSE NOTE - NSICDXFAMILYHX_GEN_ALL_CORE_FT
FAMILY HISTORY:  Family history of early CAD  Family history of renal disease  FH: senile dementia

## 2021-09-03 NOTE — ED ADULT NURSE NOTE - OBJECTIVE STATEMENT
pt c/o Chest pain that started at 3pm today after eating. Denies SPB, denies palpitations. pt was seen in ED in NOV for similar episode and hhad CCTA done that was normal.

## 2021-09-03 NOTE — ED PROVIDER NOTE - NSICDXPASTMEDICALHX_GEN_ALL_CORE_FT
PAST MEDICAL HISTORY:  Fibromyalgia     GERD (gastroesophageal reflux disease)     H/O melanoma excision     HTN (hypertension)     Hypothyroidism     Irritable bowel disease     Leg edema BLE    Lung nodule     Migraines

## 2021-09-03 NOTE — ED PROVIDER NOTE - NSFOLLOWUPINSTRUCTIONS_ED_ALL_ED_FT

## 2021-09-03 NOTE — ED PROVIDER NOTE - CARE PROVIDER_API CALL
[facility]  Diagnostic Radiology  [address] [place] [id number]  Phone: [phone]  Fax: [phone]  Follow Up Time:

## 2021-09-03 NOTE — ED PROVIDER NOTE - PATIENT PORTAL LINK FT
You can access the FollowMyHealth Patient Portal offered by [facility] by registering at the following website: [email or web address]. By joining [facility]’s FollowMyHealth portal, you will also be able to view your health information using other applications (apps) compatible with our system.

## 2021-09-03 NOTE — ED PROVIDER NOTE - PHYSICAL EXAMINATION
Constitutional: Well developed, well nourished. NAD  Head: Normocephalic, atraumatic.  Eyes: PERRL, EOMI.  ENT: No nasal discharge. Mucous membranes dry.  Neck: Supple. Painless ROM.  Cardiovascular: Normal S1, S2. Regular rate and rhythm.  + chest wall tenderness on palpation;   Pulmonary:  Lungs clear to auscultation bilaterally.   Abdominal: Soft. Nondistended. No rebound, guarding, rigidity.  Extremities. Pelvis stable. No lower extremity edema, symmetric calves.  Skin: No rashes, cyanosis.  Neuro: AAOx3. No focal neurological deficits.  Psych: Normal mood. Normal affect.

## 2021-09-03 NOTE — ED PROVIDER NOTE - INTERNATIONAL TRAVEL
27 y.o. male with no significant past medical history who presents from home via personal vehicle with chief complaint of abdominal pain. Pt reports he has had the onset of abdominal pain since August and has gone to see multiple doctors in regards to his abdominal pain and was told he had a pulled muscle or UTI so pt was given ABX and pain killers with relief to his symptoms. Thanksgiving day, pt says the pain started up again so he saw a urologist, Dr. Nohemy Rod, and had a CT scan done that showed the pt had two 1mm kidney stones in his left kidney and was advised to take ibuprofen and to stretch at home. Today, pt reports 3 hours ago he felt the onset of his abdominal pain with radiation to his bladder and lower left back. He rates his pain a \"5\"/10. Pt specifically affirms: abdominal pain, nausea, dysuria, and lower back pain. Pt specifically denies: nausea, vomiting, blood in urine or, blood in stool, and groin swelling or redness. There are no other acute medical concerns at this time. PCP: [facility], Pcp    Note written by Syl Perez, as dictated by Patricia Sanchez, DO 9:33 PM         The history is provided by the patient. No  was used. History reviewed. No pertinent past medical history. History reviewed. No pertinent surgical history. History reviewed. No pertinent family history.     Social History     Socioeconomic History    Marital status: SINGLE     Spouse name: Not on file    Number of children: Not on file    Years of education: Not on file    Highest education level: Not on file   Occupational History    Not on file   Social Needs    Financial resource strain: Not on file    Food insecurity:     Worry: Not on file     Inability: Not on file    Transportation needs:     Medical: Not on file     Non-medical: Not on file   Tobacco Use    Smoking status: Never Smoker    Smokeless tobacco: Never Used   Substance and Sexual Activity    Alcohol use: Never     Frequency: Never    Drug use: Not on file    Sexual activity: Not on file   Lifestyle    Physical activity:     Days per week: Not on file     Minutes per session: Not on file    Stress: Not on file   Relationships    Social connections:     Talks on phone: Not on file     Gets together: Not on file     Attends Oriental orthodox service: Not on file     Active member of club or organization: Not on file     Attends meetings of clubs or organizations: Not on file     Relationship status: Not on file    Intimate partner violence:     Fear of current or ex partner: Not on file     Emotionally abused: Not on file     Physically abused: Not on file     Forced sexual activity: Not on file   Other Topics Concern    Not on file   Social History Narrative    Not on file         ALLERGIES: Patient has no known allergies. Review of Systems   Constitutional: Negative for activity change, appetite change, chills and fever. HENT: Negative for congestion, rhinorrhea, sinus pain, sneezing and sore throat. Eyes: Negative for photophobia and visual disturbance. Respiratory: Negative for cough and shortness of breath. Cardiovascular: Negative for chest pain and leg swelling. Gastrointestinal: Positive for abdominal pain, diarrhea and vomiting. Negative for blood in stool, constipation and nausea. Genitourinary: Positive for dysuria. Negative for difficulty urinating, flank pain, hematuria, penile pain and testicular pain. Musculoskeletal: Positive for back pain (lower left). Negative for arthralgias, joint swelling, myalgias and neck pain. Skin: Negative for rash and wound. Neurological: Negative for syncope, weakness, light-headedness, numbness and headaches. Psychiatric/Behavioral: Negative for self-injury and suicidal ideas. All other systems reviewed and are negative.       Vitals:    12/16/19 1945   BP: 118/77   Pulse: 117   Resp: 16   Temp: 99 °F (37.2 °C)   SpO2: 98%            Physical Exam  Vitals signs and nursing note reviewed. Constitutional:       General: He is not in acute distress. Appearance: He is well-developed. He is not diaphoretic. Comments: Pleasant. HENT:      Head: Normocephalic and atraumatic. Eyes:      Conjunctiva/sclera: Conjunctivae normal.      Pupils: Pupils are equal, round, and reactive to light. Neck:      Musculoskeletal: Neck supple. Cardiovascular:      Rate and Rhythm: Normal rate and regular rhythm. Heart sounds: Normal heart sounds. Pulmonary:      Effort: Pulmonary effort is normal.      Breath sounds: Normal breath sounds. Abdominal:      General: There is no distension. Palpations: Abdomen is soft. Tenderness: There is no tenderness. There is no right CVA tenderness, left CVA tenderness, guarding or rebound. Comments: Mild discomfort with LLQ palpation. No peritoneal.    Musculoskeletal:         General: No tenderness. Skin:     General: Skin is warm and dry. Neurological:      Mental Status: He is alert and oriented to person, place, and time. Note written by Syl Galvin, as dictated by Ariana Donnelly DO 9:33 PM       MDM   27year-old male presents with history suggestive of passing intermittent small kidney stones, with recent imaging showing to left-sided kidney stones but no stones in the ureter. UA returned showing no evidence of infection or blood but does show 3+ crystals. Labs returned showing no significant abnormalities. CT abdomen pelvis was done to further evaluate given concern for not actively passing kidney stones. CT shows 2 tiny nonobstructing left renal calculi but no ureteral calculi or obstruction. He was given IV fluid bolus and Toradol in the ED and had significant improvement in his symptoms. Feel that he may be potentially passing very small kidney stones.   He was recommended to increase his p.o. fluid intake and was given dietary recommendations to try to avoid further kidney stone formation future. He is recommended urology follow-up and Rx Zofran and Naprosyn for further symptomatic relief, as needed. Return precautions were given for worsening or concerns. This plan was discussed with the patient and his mother at the bedside and they stated both understanding and agreement.       Procedures No

## 2021-09-04 VITALS
HEART RATE: 72 BPM | DIASTOLIC BLOOD PRESSURE: 91 MMHG | SYSTOLIC BLOOD PRESSURE: 142 MMHG | OXYGEN SATURATION: 100 % | RESPIRATION RATE: 18 BRPM

## 2021-09-04 LAB
ALBUMIN SERPL ELPH-MCNC: 4.2 G/DL — SIGNIFICANT CHANGE UP (ref 3.5–5.2)
ALP SERPL-CCNC: 85 U/L — SIGNIFICANT CHANGE UP (ref 30–115)
ALT FLD-CCNC: 14 U/L — SIGNIFICANT CHANGE UP (ref 0–41)
ANION GAP SERPL CALC-SCNC: 11 MMOL/L — SIGNIFICANT CHANGE UP (ref 7–14)
AST SERPL-CCNC: 13 U/L — SIGNIFICANT CHANGE UP (ref 0–41)
BILIRUB SERPL-MCNC: 0.6 MG/DL — SIGNIFICANT CHANGE UP (ref 0.2–1.2)
BUN SERPL-MCNC: 17 MG/DL — SIGNIFICANT CHANGE UP (ref 10–20)
CALCIUM SERPL-MCNC: 9 MG/DL — SIGNIFICANT CHANGE UP (ref 8.5–10.1)
CHLORIDE SERPL-SCNC: 101 MMOL/L — SIGNIFICANT CHANGE UP (ref 98–110)
CO2 SERPL-SCNC: 26 MMOL/L — SIGNIFICANT CHANGE UP (ref 17–32)
CREAT SERPL-MCNC: 0.7 MG/DL — SIGNIFICANT CHANGE UP (ref 0.7–1.5)
GLUCOSE SERPL-MCNC: 98 MG/DL — SIGNIFICANT CHANGE UP (ref 70–99)
MAGNESIUM SERPL-MCNC: 1.8 MG/DL — SIGNIFICANT CHANGE UP (ref 1.8–2.4)
NT-PROBNP SERPL-SCNC: 143 PG/ML — SIGNIFICANT CHANGE UP (ref 0–300)
POTASSIUM SERPL-MCNC: 3.2 MMOL/L — LOW (ref 3.5–5)
POTASSIUM SERPL-SCNC: 3.2 MMOL/L — LOW (ref 3.5–5)
PROT SERPL-MCNC: 6.7 G/DL — SIGNIFICANT CHANGE UP (ref 6–8)
SODIUM SERPL-SCNC: 138 MMOL/L — SIGNIFICANT CHANGE UP (ref 135–146)
TROPONIN T SERPL-MCNC: <0.01 NG/ML — SIGNIFICANT CHANGE UP

## 2021-09-04 RX ADMIN — SODIUM CHLORIDE 1000 MILLILITER(S): 9 INJECTION, SOLUTION INTRAVENOUS at 01:13

## 2021-09-13 ENCOUNTER — OUTPATIENT (OUTPATIENT)
Dept: OUTPATIENT SERVICES | Facility: HOSPITAL | Age: 61
LOS: 1 days | Discharge: HOME | End: 2021-09-13

## 2021-09-13 ENCOUNTER — APPOINTMENT (OUTPATIENT)
Dept: OBGYN | Facility: CLINIC | Age: 61
End: 2021-09-13
Payer: COMMERCIAL

## 2021-09-13 VITALS
WEIGHT: 252 LBS | SYSTOLIC BLOOD PRESSURE: 134 MMHG | HEIGHT: 64 IN | BODY MASS INDEX: 43.02 KG/M2 | DIASTOLIC BLOOD PRESSURE: 82 MMHG

## 2021-09-13 DIAGNOSIS — Z98.890 OTHER SPECIFIED POSTPROCEDURAL STATES: Chronic | ICD-10-CM

## 2021-09-13 DIAGNOSIS — Z90.49 ACQUIRED ABSENCE OF OTHER SPECIFIED PARTS OF DIGESTIVE TRACT: Chronic | ICD-10-CM

## 2021-09-13 DIAGNOSIS — Z90.710 ACQUIRED ABSENCE OF BOTH CERVIX AND UTERUS: Chronic | ICD-10-CM

## 2021-09-13 DIAGNOSIS — Z96.652 PRESENCE OF LEFT ARTIFICIAL KNEE JOINT: Chronic | ICD-10-CM

## 2021-09-13 PROCEDURE — 99396 PREV VISIT EST AGE 40-64: CPT

## 2021-09-13 NOTE — HISTORY OF PRESENT ILLNESS
[TextBox_4] : Here for annual. C/O occasional vaginal dryness.  [Mammogramdate] : 6/21 [PapSmeardate] : n [TextBox_31] : no longer indicated. [No] : Patient does not have concerns regarding sex [Previously active] : previously active

## 2021-09-13 NOTE — PHYSICAL EXAM
[Appropriately responsive] : appropriately responsive [Alert] : alert [No Lymphadenopathy] : no lymphadenopathy [No Acute Distress] : no acute distress [Regular Rate Rhythm] : regular rate rhythm [No Murmurs] : no murmurs [Clear to Auscultation B/L] : clear to auscultation bilaterally [Soft] : soft [Non-tender] : non-tender [Non-distended] : non-distended [No HSM] : No HSM [No Lesions] : no lesions [No Mass] : no mass [Oriented x3] : oriented x3 [FreeTextEntry6] : Breast exam just done by Breast Surgeon. [Labia Majora] : normal [Labia Minora] : normal [Normal] : normal [Absent] : absent [Uterine Adnexae] : absent

## 2021-09-17 ENCOUNTER — TRANSCRIPTION ENCOUNTER (OUTPATIENT)
Age: 61
End: 2021-09-17

## 2021-11-03 ENCOUNTER — NON-APPOINTMENT (OUTPATIENT)
Age: 61
End: 2021-11-03

## 2021-11-15 ENCOUNTER — RESULT REVIEW (OUTPATIENT)
Age: 61
End: 2021-11-15

## 2021-11-15 ENCOUNTER — OUTPATIENT (OUTPATIENT)
Dept: OUTPATIENT SERVICES | Facility: HOSPITAL | Age: 61
LOS: 1 days | Discharge: HOME | End: 2021-11-15
Payer: COMMERCIAL

## 2021-11-15 DIAGNOSIS — Z98.890 OTHER SPECIFIED POSTPROCEDURAL STATES: Chronic | ICD-10-CM

## 2021-11-15 DIAGNOSIS — Z90.49 ACQUIRED ABSENCE OF OTHER SPECIFIED PARTS OF DIGESTIVE TRACT: Chronic | ICD-10-CM

## 2021-11-15 DIAGNOSIS — Z96.652 PRESENCE OF LEFT ARTIFICIAL KNEE JOINT: Chronic | ICD-10-CM

## 2021-11-15 DIAGNOSIS — Z90.710 ACQUIRED ABSENCE OF BOTH CERVIX AND UTERUS: Chronic | ICD-10-CM

## 2021-11-15 DIAGNOSIS — R92.8 OTHER ABNORMAL AND INCONCLUSIVE FINDINGS ON DIAGNOSTIC IMAGING OF BREAST: ICD-10-CM

## 2021-11-15 PROCEDURE — 77066 DX MAMMO INCL CAD BI: CPT | Mod: 26

## 2021-11-15 PROCEDURE — G0279: CPT | Mod: 26

## 2021-11-15 PROCEDURE — 76642 ULTRASOUND BREAST LIMITED: CPT | Mod: 26,50

## 2021-11-21 ENCOUNTER — EMERGENCY (EMERGENCY)
Facility: HOSPITAL | Age: 61
LOS: 0 days | Discharge: HOME | End: 2021-11-21
Attending: EMERGENCY MEDICINE | Admitting: EMERGENCY MEDICINE
Payer: COMMERCIAL

## 2021-11-21 VITALS
SYSTOLIC BLOOD PRESSURE: 143 MMHG | DIASTOLIC BLOOD PRESSURE: 80 MMHG | RESPIRATION RATE: 16 BRPM | HEART RATE: 65 BPM | OXYGEN SATURATION: 97 % | TEMPERATURE: 97 F

## 2021-11-21 VITALS
HEART RATE: 79 BPM | DIASTOLIC BLOOD PRESSURE: 83 MMHG | HEIGHT: 64 IN | SYSTOLIC BLOOD PRESSURE: 180 MMHG | TEMPERATURE: 97 F | OXYGEN SATURATION: 97 % | RESPIRATION RATE: 18 BRPM | WEIGHT: 242.07 LBS

## 2021-11-21 DIAGNOSIS — M79.7 FIBROMYALGIA: ICD-10-CM

## 2021-11-21 DIAGNOSIS — Z91.018 ALLERGY TO OTHER FOODS: ICD-10-CM

## 2021-11-21 DIAGNOSIS — Z88.8 ALLERGY STATUS TO OTHER DRUGS, MEDICAMENTS AND BIOLOGICAL SUBSTANCES: ICD-10-CM

## 2021-11-21 DIAGNOSIS — M79.632 PAIN IN LEFT FOREARM: ICD-10-CM

## 2021-11-21 DIAGNOSIS — Z90.710 ACQUIRED ABSENCE OF BOTH CERVIX AND UTERUS: Chronic | ICD-10-CM

## 2021-11-21 DIAGNOSIS — M25.512 PAIN IN LEFT SHOULDER: ICD-10-CM

## 2021-11-21 DIAGNOSIS — M79.622 PAIN IN LEFT UPPER ARM: ICD-10-CM

## 2021-11-21 DIAGNOSIS — Z91.040 LATEX ALLERGY STATUS: ICD-10-CM

## 2021-11-21 DIAGNOSIS — R07.89 OTHER CHEST PAIN: ICD-10-CM

## 2021-11-21 DIAGNOSIS — Z98.890 OTHER SPECIFIED POSTPROCEDURAL STATES: Chronic | ICD-10-CM

## 2021-11-21 DIAGNOSIS — Z90.49 ACQUIRED ABSENCE OF OTHER SPECIFIED PARTS OF DIGESTIVE TRACT: Chronic | ICD-10-CM

## 2021-11-21 DIAGNOSIS — K21.9 GASTRO-ESOPHAGEAL REFLUX DISEASE WITHOUT ESOPHAGITIS: ICD-10-CM

## 2021-11-21 DIAGNOSIS — Z96.652 PRESENCE OF LEFT ARTIFICIAL KNEE JOINT: Chronic | ICD-10-CM

## 2021-11-21 DIAGNOSIS — E03.9 HYPOTHYROIDISM, UNSPECIFIED: ICD-10-CM

## 2021-11-21 DIAGNOSIS — I10 ESSENTIAL (PRIMARY) HYPERTENSION: ICD-10-CM

## 2021-11-21 PROCEDURE — 73060 X-RAY EXAM OF HUMERUS: CPT | Mod: 26,LT

## 2021-11-21 PROCEDURE — 73030 X-RAY EXAM OF SHOULDER: CPT | Mod: 26,LT

## 2021-11-21 PROCEDURE — 99284 EMERGENCY DEPT VISIT MOD MDM: CPT

## 2021-11-21 PROCEDURE — 73090 X-RAY EXAM OF FOREARM: CPT | Mod: 26,LT

## 2021-11-21 PROCEDURE — 71046 X-RAY EXAM CHEST 2 VIEWS: CPT | Mod: 26

## 2021-11-21 PROCEDURE — 73080 X-RAY EXAM OF ELBOW: CPT | Mod: 26,LT

## 2021-11-21 PROCEDURE — 93010 ELECTROCARDIOGRAM REPORT: CPT

## 2021-11-21 RX ORDER — KETOROLAC TROMETHAMINE 30 MG/ML
30 SYRINGE (ML) INJECTION ONCE
Refills: 0 | Status: DISCONTINUED | OUTPATIENT
Start: 2021-11-21 | End: 2021-11-21

## 2021-11-21 RX ADMIN — Medication 30 MILLIGRAM(S): at 21:23

## 2021-11-21 NOTE — ED PROVIDER NOTE - ATTENDING CONTRIBUTION TO CARE
61yF HTN fibromyalgia hypothyroidism c/b thyroid cancer s/p thyroidectomy L sided breast cancer s/p lumpectomy and radiation p/w L arm pain x days - woke up yesterday w/ mild discomfort at L lateral upper arm.  No recollected trauma/injury and this is not her typical pain w/ fibromyalgia.  Pain worsened today, now also intermittent at anterior shoulder/axilla.  No CP or SOB.  No fever or cough.  No weakness or paresthesias to LUE.

## 2021-11-21 NOTE — ED PROVIDER NOTE - PHYSICAL EXAMINATION
CONSTITUTIONAL: Well-developed; well-nourished; in no acute distress.   SKIN: warm, dry.  HEAD: Normocephalic; atraumatic.  EYES: PERRL, EOMI, no conjunctival erythema  ENT: No nasal discharge; airway clear.  NECK: Supple; non tender.  EXT:  +mild L shoulder/upper arm pain with active/passive abduction above 90 degrees, +ttp to mid forearm, humerus, shoulder, and L chest wall.  NEURO: Alert, oriented, grossly unremarkable. No focal neuro. deficits. Sensation intact in b/l upper extremities.  PSYCH: Cooperative, appropriate. CONSTITUTIONAL: Well-developed; well-nourished; in no acute distress.   SKIN: warm, dry.  HEAD: Normocephalic; atraumatic.  EYES: PERRL, EOMI, no conjunctival erythema  ENT: No nasal discharge; airway clear.  NECK: Supple; non tender.  EXT:  +mild L shoulder/upper arm pain with active/passive abduction above 90 degrees. +ttp to mid forearm, humerus, shoulder, and L chest wall.  NEURO: Alert, oriented, grossly unremarkable. No focal neuro. deficits. Sensation intact in b/l upper extremities.  PSYCH: Cooperative, appropriate.

## 2021-11-21 NOTE — ED PROVIDER NOTE - PATIENT PORTAL LINK FT
You can access the FollowMyHealth Patient Portal offered by [facility] by registering at the following website: [email or web address]. By joining [employer]’s FollowMyHealth portal, you will also be able to view your health information using other applications (apps) compatible with our system.

## 2021-11-21 NOTE — ED ADULT TRIAGE NOTE - CHIEF COMPLAINT QUOTE
left arm pain x 1 day, unable to raise arm, pain worse upon movement and palpation. denies any fall or injury

## 2021-11-21 NOTE — ED PROVIDER NOTE - PROVIDER TOKENS
PROVIDER:[TOKEN:[[id number]],FOLLOWUP:[4-6 Days]],PROVIDER:[TOKEN:[[id number]],FOLLOWUP:[Routine]]

## 2021-11-21 NOTE — ED PROVIDER NOTE - OBJECTIVE STATEMENT
62 yo f with pmh of L sided breast CA s/p lumpectomy/RT, hypothyroidism, thyroid ca s/p thyroidectomy, fibromyalgia, IBS, GERD, HTN presenting to the ED for L shoulder/L arm pain. Patient states that she woke up yesterday AM with pain radiating down from L side of her neck into shoulder and forearm. Denies any known injury or trauma to the area. States that the pain was worse when she woke up this AM. Took 600mg of motrin around noon today. +L sided chest wall pain. Patient states that she is unable to lift arm unless she physically lifts it up herself. Denies any paresthesias to the arm.

## 2021-11-21 NOTE — ED PROVIDER NOTE - CLINICAL SUMMARY MEDICAL DECISION MAKING FREE TEXT BOX
61yF HTN p/w L sided shoulder/arm pain.  Pt NVI and w/o recollected trauma.  Imaging w/o traumatic injury.  EKG w/o ischemia or arrhythmia.  Exam w/ intact distal perfusion, ROM (though limited effort 2/2 pain), sensation and strength.  Recommend supportive care, o/p f/u with PCP Dr. Parrish (pt instructed to call for appointment in 2-3d if not improving), return precautions.

## 2021-11-21 NOTE — ED PROVIDER NOTE - CARE PROVIDER_API CALL
Damion Cee)  Orthopaedic Surgery  [address] [place] [id number]  Phone: [phone]  Fax: [phone]  Follow Up Time: 4-6 Days    Josiane Parrish  INTERNAL MEDICINE  [address] [place] [id number]  Phone: [phone]  Fax: [phone]  Follow Up Time: Routine

## 2021-11-21 NOTE — ED PROVIDER NOTE - NSFOLLOWUPINSTRUCTIONS_ED_ALL_ED_FT
Arm Pain    WHAT YOU NEED TO KNOW:    Your arm pain may be caused by a number of conditions. Examples include arthritis, nerve problems, or an awkward position while you sleep. X-rays did not show a broken bone in your arm or wrist. Arm pain may be a sign of a serious condition that needs immediate care, such as a heart attack.    DISCHARGE INSTRUCTIONS:    Call 911 for any of the following: You have any of the following signs of a heart attack:     Squeezing, pressure, or pain in your chest that lasts longer than 5 minutes or returns      Discomfort or pain in your back, neck, jaw, stomach, or arm       Trouble breathing or a fast, fluttery heartbeat      Nausea or vomiting      Lightheadedness or a sudden cold sweat, especially with chest pain or trouble breathing    Return to the emergency department if:     You have severe pain, or pain that spreads from your arm to other areas.      You have swelling, tingling, or numbness in your hand or fingers, or the skin turns blue.      You cannot move your arm.    Contact your healthcare provider if:     You have questions or concerns about your condition or care.        Medicines: You may need any of the following:     Prescription pain medicine may be given. Ask how to take this medicine safely.      NSAIDs, such as ibuprofen, help decrease swelling, pain, and fever. This medicine is available with or without a doctor's order. NSAIDs can cause stomach bleeding or kidney problems in certain people. If you take blood thinner medicine, always ask your healthcare provider if NSAIDs are safe for you. Always read the medicine label and follow directions.      Take your medicine as directed. Contact your healthcare provider if you think your medicine is not helping or if you have side effects. Tell him or her if you are allergic to any medicine. Keep a list of the medicines, vitamins, and herbs you take. Include the amounts, and when and why you take them. Bring the list or the pill bottles to follow-up visits. Carry your medicine list with you in case of an emergency.    Self-care:     Rest your arm as directed. A sling may be used to keep your arm from moving while it heals.      Apply ice as directed. Ice helps decrease pain and swelling. Ice may also help prevent tissue damage. Use an ice pack, or put crushed ice in a plastic bag. Cover it with a towel. Apply it to your arm for 20 minutes every few hours, or as directed. Ask how many times to apply ice each day, and for how many days.      Elevate your arm above the level of your heart as often as you can. This will help decrease swelling and pain. Prop your arm on pillows or blankets to keep the area elevated comfortably.      Adjust your position if you work in front of a computer. You may need arm or wrist supports or change the height of your chair.       Keep a pain record. Write down when your pain happens and how severe it is. Include any other symptoms you have with your pain. A record will help you keep track of pain cycles. Bring the record with you to your follow-up visits. It may also help your healthcare provider find out what is causing your pain.    Follow up with your healthcare provider as directed: You may need physical therapy. You may need to see an orthopedic specialist. Write down your questions so you remember to ask them during your visits.       © Copyright [employer] 2019 All illustrations and images included in CareNotes are the copyrighted property of [employer] or [employer].

## 2021-12-06 NOTE — ED ADULT NURSE NOTE - AREA
HPI:  Chon Coley is a 69 06/15/2018    Hospitalized at Fayette County Memorial Bogdan Toribio MD;  Location:  HEART CARDIAC CATH LAB     diffuse

## 2021-12-21 ENCOUNTER — OUTPATIENT (OUTPATIENT)
Dept: OUTPATIENT SERVICES | Facility: HOSPITAL | Age: 61
LOS: 1 days | Discharge: HOME | End: 2021-12-21

## 2021-12-21 DIAGNOSIS — Z98.890 OTHER SPECIFIED POSTPROCEDURAL STATES: Chronic | ICD-10-CM

## 2021-12-21 DIAGNOSIS — Z90.49 ACQUIRED ABSENCE OF OTHER SPECIFIED PARTS OF DIGESTIVE TRACT: Chronic | ICD-10-CM

## 2021-12-21 DIAGNOSIS — Z96.652 PRESENCE OF LEFT ARTIFICIAL KNEE JOINT: Chronic | ICD-10-CM

## 2021-12-21 DIAGNOSIS — S93.402A SPRAIN OF UNSPECIFIED LIGAMENT OF LEFT ANKLE, INITIAL ENCOUNTER: ICD-10-CM

## 2021-12-21 DIAGNOSIS — S93.402D SPRAIN OF UNSPECIFIED LIGAMENT OF LEFT ANKLE, SUBSEQUENT ENCOUNTER: ICD-10-CM

## 2021-12-21 DIAGNOSIS — Z90.710 ACQUIRED ABSENCE OF BOTH CERVIX AND UTERUS: Chronic | ICD-10-CM

## 2022-01-26 ENCOUNTER — FORM ENCOUNTER (OUTPATIENT)
Age: 62
End: 2022-01-26

## 2022-01-26 ENCOUNTER — APPOINTMENT (OUTPATIENT)
Dept: ORTHOPEDIC SURGERY | Facility: CLINIC | Age: 62
End: 2022-01-26
Payer: OTHER MISCELLANEOUS

## 2022-01-26 VITALS — WEIGHT: 250 LBS | HEIGHT: 64 IN | BODY MASS INDEX: 42.68 KG/M2

## 2022-01-26 DIAGNOSIS — M19.90 UNSPECIFIED OSTEOARTHRITIS, UNSPECIFIED SITE: ICD-10-CM

## 2022-01-26 DIAGNOSIS — M17.10 UNILATERAL PRIMARY OSTEOARTHRITIS, UNSPECIFIED KNEE: ICD-10-CM

## 2022-01-26 DIAGNOSIS — Z96.652 PAIN DUE TO INTERNAL ORTHOPEDIC PROSTHETIC DEVICES, IMPLANTS AND GRAFTS, INITIAL ENCOUNTER: ICD-10-CM

## 2022-01-26 DIAGNOSIS — Z86.79 PERSONAL HISTORY OF OTHER DISEASES OF THE CIRCULATORY SYSTEM: ICD-10-CM

## 2022-01-26 DIAGNOSIS — T84.84XA PAIN DUE TO INTERNAL ORTHOPEDIC PROSTHETIC DEVICES, IMPLANTS AND GRAFTS, INITIAL ENCOUNTER: ICD-10-CM

## 2022-01-26 DIAGNOSIS — R60.0 LOCALIZED EDEMA: ICD-10-CM

## 2022-01-26 PROCEDURE — 99214 OFFICE O/P EST MOD 30 MIN: CPT

## 2022-01-26 PROCEDURE — 73562 X-RAY EXAM OF KNEE 3: CPT | Mod: 50

## 2022-01-26 PROCEDURE — 99072 ADDL SUPL MATRL&STAF TM PHE: CPT

## 2022-01-31 ENCOUNTER — FORM ENCOUNTER (OUTPATIENT)
Age: 62
End: 2022-01-31

## 2022-02-02 ENCOUNTER — FORM ENCOUNTER (OUTPATIENT)
Age: 62
End: 2022-02-02

## 2022-02-03 ENCOUNTER — APPOINTMENT (OUTPATIENT)
Dept: HEMATOLOGY ONCOLOGY | Facility: CLINIC | Age: 62
End: 2022-02-03
Payer: COMMERCIAL

## 2022-02-03 ENCOUNTER — OUTPATIENT (OUTPATIENT)
Dept: OUTPATIENT SERVICES | Facility: HOSPITAL | Age: 62
LOS: 1 days | Discharge: HOME | End: 2022-02-03

## 2022-02-03 VITALS
SYSTOLIC BLOOD PRESSURE: 167 MMHG | TEMPERATURE: 97.9 F | WEIGHT: 261.5 LBS | BODY MASS INDEX: 44.64 KG/M2 | RESPIRATION RATE: 16 BRPM | DIASTOLIC BLOOD PRESSURE: 74 MMHG | HEART RATE: 72 BPM | HEIGHT: 64 IN

## 2022-02-03 DIAGNOSIS — Z96.652 PRESENCE OF LEFT ARTIFICIAL KNEE JOINT: Chronic | ICD-10-CM

## 2022-02-03 DIAGNOSIS — Z90.49 ACQUIRED ABSENCE OF OTHER SPECIFIED PARTS OF DIGESTIVE TRACT: Chronic | ICD-10-CM

## 2022-02-03 DIAGNOSIS — Z90.710 ACQUIRED ABSENCE OF BOTH CERVIX AND UTERUS: Chronic | ICD-10-CM

## 2022-02-03 DIAGNOSIS — Z98.890 OTHER SPECIFIED POSTPROCEDURAL STATES: Chronic | ICD-10-CM

## 2022-02-03 PROCEDURE — 99213 OFFICE O/P EST LOW 20 MIN: CPT

## 2022-02-03 NOTE — ASSESSMENT
[FreeTextEntry1] : 58 yo female has left breast breast DCIS, intermediate nuclear grade, ER/MS positive, s/p lumpectomy with negative margins. \par \par Recommendation:\par -- Continue Anastrozole daily.\par -- Breast exam did not reveal palpable abnormality. She will resume annual screening mammo in 11/2022. \par -- She is given a referral for repeat bone density. Continue calcium and vitamin D supplement and physical activity.  \par -- Followup with orthopedic for knee problems.\par -- Followup with Dr. Castillo and Dr. Oswald as indicated.\par -- RTO for followup in 6 months.\par \par

## 2022-02-03 NOTE — HISTORY OF PRESENT ILLNESS
[de-identified] : 58 yo female is referred by Dr. Stevens for consultation of adjuvant endocrine therapy. The patient had a screening mammogram on 12/12/19 which showed mass and calcifications in the left breast requiring additional evaluation. On 12/20/19, left breast dx mammo and US showed segmental calcifications in the UOQ of the left breast spanning over about 5 cm. The previously noted apparent mass persisted on spot compression views and corresponded with a benign cyst seen on concurrent US.\par \par On 12/24/19, stereotactic core biopsy of LUOQ, Posterior and anterior calcifications, Calcifications span 5 CM both revealed DCIS solid and cribriform types with comedo necrosis and calcifications, intermediate nuclear grade. \par Estrogen receptor positive, 95%\par Progesterone receptor positive, 75%\par \par Her family history is significant for her maternal aunt with breast cancer in her 70's; her maternal first cousin with breast cancer in her 50's; her paternal first cousin with breast cancer in her 50's. 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 No genetic testing in the patient or her family. \par \par On 1/27/2020, she underwent Left NLOC. The pathology revealed 2 healing prior biopsy sites with widespread DCIS, micropapillary and comedo types associated with calcifications, intermediated nuclear grade and present in 21/35 slides. The inferiore margin was involved and lateral margin was 1.0 mm. Reexcision of left inferior and lateral margins were negative. \par \par The patient recovered well from surgery. She is here today to discuss adjuvant endocrine therapy.\par \par She had RATLH/BSO for persistent PMB in 11/2019. The pathology revealed benign endometrial polyps, corpus uteri showing intramural leiomyomas and ovaries without significant pathologic change.\par  [de-identified] : 7/27/2020:\par The patient is here for followup visit. She was diagnosed with  left breast breast DCIS, intermediate nuclear grade, ER/MT positive, s/p lumpectomy with negative margins. She received adjuvant WBI with 5240cGy to the left breast from 3/16/2020 through 4/6/2020 without any complications. She has been taking Anastrozole daily since 4/2020 and tolerated well. She had bone density in 2/2020 which was within normal limit. She does not have breast related complains. \par \par 1/28/2021:\par The patient is here for followup visit. She was diagnosed with  left breast breast DCIS, intermediate nuclear grade, ER/MT positive, s/p lumpectomy on 1/27/2020 with negative margins. She received adjuvant WBI with 5240cGy to the left breast from 3/16/2020 through 4/6/2020 without any complications. She has been taking Anastrozole daily since 4/2020 and tolerated well. She had bone density in 2/2020 which was within normal limit. She does not have breast related complains. On 12/14/2020, she had b/l dx mammo and US which showed post left lumpectomy and radiation therapy with expected posttreatment changes in. No abnormal clusters of microcalcifications. Recommendation: Follow-up unilateral diagnostic mammogram and left ultrasound in 6 months.  Bone Density from 2/19/20 was normal.\par \par 8/23/21:\par The patient is here for followup visit. She was diagnosed with  left breast breast DCIS, intermediate nuclear grade, ER/MT positive, s/p lumpectomy on 1/27/2020 with negative margins. She received adjuvant WBI with 5240cGy to the left breast from 3/16/2020 through 4/6/2020. She has been taking Anastrozole daily since 4/2020 and tolerated well. She had bone density in 2/2020 which was within normal limit. On 6/14/21, she had left breast dx mammo and US which showed stable architectural distortion most consistent with postsurgical change/fat necrosis. Bone Density from 2/19/20 was normal. She complains feeling itching under her both breast. \par \par 2/3/22:\par The patient is here for followup visit. She has left breast breast DCIS, intermediate nuclear grade, ER/MT positive, s/p lumpectomy on 1/27/2020 with negative margins. She received adjuvant WBI with 5240cGy to the left breast from 3/16/2020 through 4/6/2020. She has been taking Anastrozole daily since 4/2020 and tolerated well.  On 11/15/21, she had b/l breast dx mammo and US which showed stable architectural distortion most consistent with postsurgical change/fat necrosis. Bone Density from 2/19/20 was normal. She has knee pain and has been followed with her orthopedic doctor.

## 2022-02-03 NOTE — PHYSICAL EXAM
[Fully active, able to carry on all pre-disease performance without restriction] : Status 0 - Fully active, able to carry on all pre-disease performance without restriction [Normal] : affect appropriate [de-identified] : Status post left breast lumpectomy. Surgical scar is healing well. There is no palpable abnormality.

## 2022-02-03 NOTE — CONSULT LETTER
[Dear  ___] : Dear  [unfilled], [Courtesy Letter:] : I had the pleasure of seeing your patient, [unfilled], in my office today. [Please see my note below.] : Please see my note below. [Sincerely,] : Sincerely, [FreeTextEntry3] : Solo Joseph MD [DrJomar ___] : Dr. ELIZABETH

## 2022-02-03 NOTE — REVIEW OF SYSTEMS
Scheduled For Follow Up In (Optional): 3-4 months
Detail Level: Zone
[Negative] : Allergic/Immunologic
[Negative] : Allergic/Immunologic

## 2022-02-03 NOTE — ASSESSMENT
[FreeTextEntry1] : 58 yo female has left breast breast DCIS, intermediate nuclear grade, ER/GA positive, s/p lumpectomy with negative margins. \par \par Recommendation:\par -- Continue Anastrozole daily.\par -- Breast exam did not reveal palpable abnormality. She will resume annual screening mammo in 11/2022. \par -- She is given a referral for repeat bone density. Continue calcium and vitamin D supplement and physical activity.  \par -- Followup with orthopedic for knee problems.\par -- Followup with Dr. Castillo and Dr. Oswald as indicated.\par -- RTO for followup in 6 months.\par \par

## 2022-02-03 NOTE — HISTORY OF PRESENT ILLNESS
[de-identified] : 58 yo female is referred by Dr. Stevens for consultation of adjuvant endocrine therapy. The patient had a screening mammogram on 12/12/19 which showed mass and calcifications in the left breast requiring additional evaluation. On 12/20/19, left breast dx mammo and US showed segmental calcifications in the UOQ of the left breast spanning over about 5 cm. The previously noted apparent mass persisted on spot compression views and corresponded with a benign cyst seen on concurrent US.\par \par On 12/24/19, stereotactic core biopsy of LUOQ, Posterior and anterior calcifications, Calcifications span 5 CM both revealed DCIS solid and cribriform types with comedo necrosis and calcifications, intermediate nuclear grade. \par Estrogen receptor positive, 95%\par Progesterone receptor positive, 75%\par \par Her family history is significant for her maternal aunt with breast cancer in her 70's; her maternal first cousin with breast cancer in her 50's; her paternal first cousin with breast cancer in her 50's. 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 No genetic testing in the patient or her family. \par \par On 1/27/2020, she underwent Left NLOC. The pathology revealed 2 healing prior biopsy sites with widespread DCIS, micropapillary and comedo types associated with calcifications, intermediated nuclear grade and present in 21/35 slides. The inferiore margin was involved and lateral margin was 1.0 mm. Reexcision of left inferior and lateral margins were negative. \par \par The patient recovered well from surgery. She is here today to discuss adjuvant endocrine therapy.\par \par She had RATLH/BSO for persistent PMB in 11/2019. The pathology revealed benign endometrial polyps, corpus uteri showing intramural leiomyomas and ovaries without significant pathologic change.\par  [de-identified] : 7/27/2020:\par The patient is here for followup visit. She was diagnosed with  left breast breast DCIS, intermediate nuclear grade, ER/VT positive, s/p lumpectomy with negative margins. She received adjuvant WBI with 5240cGy to the left breast from 3/16/2020 through 4/6/2020 without any complications. She has been taking Anastrozole daily since 4/2020 and tolerated well. She had bone density in 2/2020 which was within normal limit. She does not have breast related complains. \par \par 1/28/2021:\par The patient is here for followup visit. She was diagnosed with  left breast breast DCIS, intermediate nuclear grade, ER/VT positive, s/p lumpectomy on 1/27/2020 with negative margins. She received adjuvant WBI with 5240cGy to the left breast from 3/16/2020 through 4/6/2020 without any complications. She has been taking Anastrozole daily since 4/2020 and tolerated well. She had bone density in 2/2020 which was within normal limit. She does not have breast related complains. On 12/14/2020, she had b/l dx mammo and US which showed post left lumpectomy and radiation therapy with expected posttreatment changes in. No abnormal clusters of microcalcifications. Recommendation: Follow-up unilateral diagnostic mammogram and left ultrasound in 6 months.  Bone Density from 2/19/20 was normal.\par \par 8/23/21:\par The patient is here for followup visit. She was diagnosed with  left breast breast DCIS, intermediate nuclear grade, ER/VT positive, s/p lumpectomy on 1/27/2020 with negative margins. She received adjuvant WBI with 5240cGy to the left breast from 3/16/2020 through 4/6/2020. She has been taking Anastrozole daily since 4/2020 and tolerated well. She had bone density in 2/2020 which was within normal limit. On 6/14/21, she had left breast dx mammo and US which showed stable architectural distortion most consistent with postsurgical change/fat necrosis. Bone Density from 2/19/20 was normal. She complains feeling itching under her both breast. \par \par 2/3/22:\par The patient is here for followup visit. She has left breast breast DCIS, intermediate nuclear grade, ER/VT positive, s/p lumpectomy on 1/27/2020 with negative margins. She received adjuvant WBI with 5240cGy to the left breast from 3/16/2020 through 4/6/2020. She has been taking Anastrozole daily since 4/2020 and tolerated well.  On 11/15/21, she had b/l breast dx mammo and US which showed stable architectural distortion most consistent with postsurgical change/fat necrosis. Bone Density from 2/19/20 was normal. She has knee pain and has been followed with her orthopedic doctor.

## 2022-02-03 NOTE — PHYSICAL EXAM
[Fully active, able to carry on all pre-disease performance without restriction] : Status 0 - Fully active, able to carry on all pre-disease performance without restriction [Normal] : affect appropriate [de-identified] : Status post left breast lumpectomy. Surgical scar is healing well. There is no palpable abnormality.

## 2022-02-04 DIAGNOSIS — D05.12 INTRADUCTAL CARCINOMA IN SITU OF LEFT BREAST: ICD-10-CM

## 2022-02-04 DIAGNOSIS — Z51.81 ENCOUNTER FOR THERAPEUTIC DRUG LEVEL MONITORING: ICD-10-CM

## 2022-02-14 NOTE — DISCUSSION/SUMMARY
[Cancer Type / Location / Histology Subtype: ________] : Cancer Type / Location / Histology Subtype: [unfilled] [Diagnosis Date (year): ____] : Diagnosis Date (year): [unfilled] [Surgery] : Surgery: Yes [Surgery Date(s) (year): ____] : Surgery Date(s) (year): [unfilled] [Surgical Procedure / Location / Findings: _________] : Surgical Procedure / Location / Findings: [unfilled] [Radiation] : Radiation: Yes [Body Area Treated: _________] : Body Area Treated: [unfilled] [End Date (year): ____] : End Date (year): [unfilled] [Systemic Therapy (chemotherapy, hormonal therapy, other)] : Systemic Therapy (chemotherapy, hormonal therapy, other): Yes [Persistent symptoms or side effects at completion of treatment?  If Yes, list types ___] : Persistent symptoms or side effects at completion of treatment? No [Need for onging (adjuvant) treatment for cancer?] : Need for onging (adjuvant) treatment for cancer? Yes [Follow up with Oncologist in _____] : Follow up with Oncologist in [unfilled] [Primary care physician] : primary care physician [Mammogram] : Mammogram [Emotional and mental health] : Emotional and mental health [Physical Functioning] : Physical functioning [Memory or Concentration Loss] : Memory or concentration loss [Fatigue] : Fatigue [Alcohol use] : Alcohol use [Diet] : Diet [Sun screen use] : Sun screen use [Path to Wellness Survivorship Program] : [facility] to Well[facility] [FreeTextEntry1] :  Anastrozole daily since 4/2020

## 2022-02-16 RX ORDER — HYALURONATE SODIUM 20 MG/2 ML
20 SYRINGE (ML) INTRAARTICULAR
Qty: 1 | Refills: 0 | Status: ACTIVE | OUTPATIENT
Start: 2022-01-26

## 2022-02-16 NOTE — ASSESSMENT
[FreeTextEntry1] : 61-year-old with painful left total knee replacement performed about 3 to 4 years ago and has severe right knee osteoarthritis.\par She had good relief of right knee pain from hyaluronic acid injection several years ago and would like to try again which is very reasonable.  Steroid injection was not as helpful.  I ordered Euflexxa which had been done.  She was referred to physical therapy.  Tylenol and NSAIDs/ibuprofen as needed.\par She will come in for follow-up for the hyaluronic acid injections when they are available.\par I do not do revision knee replacements and would refer her to one of my associates if she would like.

## 2022-02-16 NOTE — PHYSICAL EXAM
[Normal Touch] : sensation intact for touch [Normal] : Oriented to person, place, and time, insight and judgement were intact and the affect was normal [Obese] : obese [de-identified] : Edema bilateral lower legs nonpitting, possibly lymphedema.  Slight erythema of the skin [LE] : Sensory: Intact in bilateral lower extremities [Normal RLE] : Right Lower Extremity: No scars, rashes, lesions, ulcers, skin intact [Normal LLE] : Left Lower Extremity: No scars, rashes, lesions, ulcers, skin intact [de-identified] : Bilateral knees\par Antalgic gait.\par Varus left knee.  Well-healed left knee incision.\par Mild right knee varus.\par Edema nonpitting bilateral lower legs.\par Tender bilateral knees medial joint line.\par Left knee range of motion 0-100 10 to 115 degrees with mild pain.  Normal stability left knee AP and medial and lateral except slight instability on full extension.\par Right knee range of motion is 0 to about 95 degrees with pain and positive crepitus.  Normal AP and varus and valgus laxity.\par Normal neurovascular exam distally. [de-identified] : \par X-rays of bilateral knees weightbearing 4 views today showed left total knee replacement with mild varus alignment without any obvious loosening.  No fracture.\par Mild left patella tilt laterally.\par On the right knee there is severe bone-on-bone medial compartment osteoarthritis with productive degenerative changes including bone spurs and sclerosis and joint space narrowing.

## 2022-02-16 NOTE — REASON FOR VISIT
[Workers' Comp: Date of Injury: _______] : This visit is related to worker's compensation. Date of Injury: [unfilled] [FreeTextEntry2] : bilateral knee pain [Initial Visit] : an initial visit for [Knee Pain] : knee pain

## 2022-02-16 NOTE — PHYSICAL EXAM
[Normal Touch] : sensation intact for touch [Normal] : Oriented to person, place, and time, insight and judgement were intact and the affect was normal [Obese] : obese [de-identified] : Edema bilateral lower legs nonpitting, possibly lymphedema.  Slight erythema of the skin [LE] : Sensory: Intact in bilateral lower extremities [Normal RLE] : Right Lower Extremity: No scars, rashes, lesions, ulcers, skin intact [Normal LLE] : Left Lower Extremity: No scars, rashes, lesions, ulcers, skin intact [de-identified] : Bilateral knees\par Antalgic gait.\par Varus left knee.  Well-healed left knee incision.\par Mild right knee varus.\par Edema nonpitting bilateral lower legs.\par Tender bilateral knees medial joint line.\par Left knee range of motion 0-100 10 to 115 degrees with mild pain.  Normal stability left knee AP and medial and lateral except slight instability on full extension.\par Right knee range of motion is 0 to about 95 degrees with pain and positive crepitus.  Normal AP and varus and valgus laxity.\par Normal neurovascular exam distally. [de-identified] : \par X-rays of bilateral knees weightbearing 4 views today showed left total knee replacement with mild varus alignment without any obvious loosening.  No fracture.\par Mild left patella tilt laterally.\par On the right knee there is severe bone-on-bone medial compartment osteoarthritis with productive degenerative changes including bone spurs and sclerosis and joint space narrowing.

## 2022-02-16 NOTE — HISTORY OF PRESENT ILLNESS
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  She has had issues with the knees ever since.  She has a history of total knee replacement on the left knee in 2018 but has had pain ever since.  She walks with a cane.\par She has seen other doctors and revision surgery has been suggested.  She is reluctant to do more surgery.  Her pain can be up to a 10 out of 10.\par Pain radiates from the knee to the ankle.  Her knee is getting more bowed.\par She has had injections in the right knee which also has gotten progressively more painful over time.  She had steroid injections by Dr. Chatman a few years ago and in the right knee she had Euflexxa injections which were very helpful from June 2019 until about 6 months ago.  She was hoping to do the Euflexxa injections again.  She takes Tylenol and ibuprofen for pain in the knees as well as for her back pain.  She has done physical therapy for both knees on many occasions in the past.

## 2022-02-16 NOTE — HISTORY OF PRESENT ILLNESS
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  She has had issues with the knees ever since.  She has a history of total knee replacement on the left knee in 2018 but has had pain ever since.  She walks with a cane.\par She has seen other doctors and revision surgery has been suggested.  She is reluctant to do more surgery.  Her pain can be up to a 10 out of 10.\par Pain radiates from the knee to the ankle.  Her knee is getting more bowed.\par She has had injections in the right knee which also has gotten progressively more painful over time.  She had steroid injections by Dr. Chatman a few years ago and in the right knee she had Euflexxa injections which were very helpful from June 2019 until about 6 months ago.  She was hoping to do the Euflexxa injections again.  She takes Tylenol and ibuprofen for pain in the knees as well as for her back pain.  She has done physical therapy for both knees on many occasions in the past.

## 2022-02-17 ENCOUNTER — APPOINTMENT (OUTPATIENT)
Dept: BREAST CENTER | Facility: CLINIC | Age: 62
End: 2022-02-17
Payer: COMMERCIAL

## 2022-02-17 VITALS
TEMPERATURE: 98.3 F | WEIGHT: 261 LBS | SYSTOLIC BLOOD PRESSURE: 156 MMHG | BODY MASS INDEX: 44.56 KG/M2 | DIASTOLIC BLOOD PRESSURE: 98 MMHG | HEIGHT: 64 IN

## 2022-02-17 PROCEDURE — 99213 OFFICE O/P EST LOW 20 MIN: CPT

## 2022-02-17 NOTE — DATA REVIEWED
[FreeTextEntry1] : EXAM:  MG US BREAST LIMITED BI\par EXAM:  MG MAMMO DIAG W WINSTON BI#\par \par \par PROCEDURE DATE:  11/15/2021\par \par \par \par INTERPRETATION:  Clinical History / Reason for exam: Patient presents for follow-up as she is status post a left lumpectomy in January 2020. Patient also complains of focal pain in the upper outer quadrant of the right breast\par \par The patient reports her last clinical breast examination was performed one month ago.\par \par Diagnostic bilateral mammogram was performed and submitted for evaluation.\par \par Computer-aided detection was utilized in the interpretation of this examination.\par \par Comparison is made to the prior studies dating back to 2018.\par \par Breast composition: There are scattered areas of fibroglandular density.\par \par The patient is status post a left lumpectomy with stable architectural distortion most consistent with postsurgical change.  No suspicious masses or abnormal groups of microcalcifications are seen in either breast.\par \par Targeted Bilateral Breast Sonogram:\par \par No suspicious findings are seen in the region of the patient's pain in the upper outer quadrant of the right breast.\par \par Again identified at the left lumpectomy site which is at the 1-2 o'clock location 8 cm from the nipple is an oval circumscribed hyperechoic mass which is without significant change measuring 1.0 cm x 1.0 cm x 0.7 cm consistent with fat necrosis.\par \par Impression: Status post a left lumpectomy with stable architectural distortion most consistent with postsurgical change/fat necrosis as above.\par \par In the region of the patient's pain in the upper outer quadrant of the right breast, no suspicious findings are seen. Clinical follow-up is recommended and further management of this patient should be based on the level of clinical concern.\par \par No evidence of malignancy.\par \par Recommendation: Unless otherwise indicated by clinical findings, annual screening mammography recommended.\par \par BI-RADS Category 2: Benign\par

## 2022-02-17 NOTE — HISTORY OF PRESENT ILLNESS
End of Shift Note: Medical    Pain Management: Last Pain Score:  8/10.                                      Pain medication:  Norco 5/325mg Q4 hours prn.  Last given:  1856      Scheduled ketorolac 15mg last @ 1856    Diet: Tolerating cc diet with ac/hs checks     Bowel Function:  Normal     LBM: PTA     Activity Up with 1 assist, Gait belt and Walker w/ platform    Significant Events:   - Neuro consulted today  - Imaging completed     LDAs: peripheral IV     Discharge:  Anticipated discharge date:  TBD.              Disposition: Home with Home Services   [FreeTextEntry1] : PCP:\par Josiane Parrish, \par \par GYN:\par Braulio Hill MD\par \par Patient with Left breast DCIS solid and cribiform types with comedo necrosis and calcifications, intermediate nuclear grade on stereotactic core biopsy 12/24/19; LUOQ, Posterior calcifications, Calcifications span 5 CM (cork).  \par Estrogen receptor positive, 95\par Progesterone receptor positive, 75\par \par Left breast DCIS, micropapillary and solid types with comedo necrosis and calcifications, intermediate nuclear grade on stereotactic core biopsy 12/24/19; LUOQ, Anterior calcifications, Calcifications span 5 CM (tophat). \par Estrogen receptor positive, 95\par Progesterone receptor positive, 75\par \par No prior complaints related to the breasts. \par Her family history is significant for her maternal aunt with breast cancer in her 70's; her maternal first cousin with breast cancer in her 50's; her paternal first cousin with breast cancer in her 50's. 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  No genetic testing in the patient or her family.  \par \par s/p Left NLOC x 2 - 01/27/2020 = Two (2) healing prior bx sites with widespread DCIS), micropapillary and comedo types associated with calcifications, intermediate nuclear grade.\par -  AJCC 8th Edition Pathologic Stage: pTis(DCIS), pN0, pMx\par \par Dr. Solo Joseph - Anastrozole\par Dr. Meliza Oswald - (+) RT to the left breast (03/16-04/06/2020)\par \par CORY RECIO is a 61 year old female patient who presents today in follow up for left breast DCIS.\par Since her last visit, she has no new breast related complaints - she continues to have sporadic breast pain. \par \par Imaging with a bilateral diagnostic mammogram and sonogram was done on 11/15/2021, which revealed there are scattered areas of fibroglandular density. The patient is status post a left lumpectomy with stable architectural distortion most consistent with postsurgical change.  \par \par Targeted Bilateral Breast Sonogram: Again identified at the left lumpectomy site which is at the 1-2 o'clock location 8 cm from the nipple is an oval circumscribed hyperechoic mass which is without significant change measuring 1.0 cm x 1.0 cm x 0.7 cm consistent with fat necrosis.\par BI-RADS Category 2: Benign\par \par

## 2022-02-17 NOTE — PHYSICAL EXAM
[Normocephalic] : normocephalic [Atraumatic] : atraumatic [No Supraclavicular Adenopathy] : no supraclavicular adenopathy [Examined in the supine and seated position] : examined in the supine and seated position [No dominant masses] : no dominant masses in right breast  [No dominant masses] : no dominant masses left breast [No Nipple Discharge] : no left nipple discharge [No Axillary Lymphadenopathy] : no left axillary lymphadenopathy [No Edema] : no edema [No Rashes] : no rashes [No Ulceration] : no ulceration [Breast Nipple Inversion] : nipples not inverted [Breast Nipple Retraction] : nipples not retracted [de-identified] : well healed surgical scar.\par palpable scar tissue.\par (+) RT changes.

## 2022-02-17 NOTE — ASSESSMENT
[FreeTextEntry1] : CORY is a david 61 year old patient who presented today in follow up for a history of left breast DCIS. \par She has been doing well with no new breast related complaints. \par Imaging with a bilateral diagnostic mammogram and sonogram was done on 11/15/2021, which revealed there are scattered areas of fibroglandular density. The patient is status post a left lumpectomy with stable architectural distortion most consistent with postsurgical change.  \par \par Targeted Bilateral Breast Sonogram: Again identified at the left lumpectomy site which is at the 1-2 o'clock location 8 cm from the nipple is an oval circumscribed hyperechoic mass which is without significant change measuring 1.0 cm x 1.0 cm x 0.7 cm consistent with fat necrosis.\par BI-RADS Category 2: Benign, as detailed above. \par Physical exam was unrevealing today.\par \par Imaging with a left unilateral diagnostic mammogram and sonogram will be ordered for May 2022, \par and that will be scheduled today. \par She will return for follow-up and clinical breast exam following imaging. \par \par PLAN:\par - Left Uni Dx Mammo & Sono - May 2022.\par -f/u after

## 2022-03-07 ENCOUNTER — APPOINTMENT (OUTPATIENT)
Dept: ORTHOPEDIC SURGERY | Facility: CLINIC | Age: 62
End: 2022-03-07

## 2022-04-04 ENCOUNTER — APPOINTMENT (OUTPATIENT)
Dept: ORTHOPEDIC SURGERY | Facility: CLINIC | Age: 62
End: 2022-04-04

## 2022-04-04 ENCOUNTER — APPOINTMENT (OUTPATIENT)
Dept: ORTHOPEDIC SURGERY | Facility: CLINIC | Age: 62
End: 2022-04-04
Payer: COMMERCIAL

## 2022-04-11 ENCOUNTER — APPOINTMENT (OUTPATIENT)
Dept: ORTHOPEDIC SURGERY | Facility: CLINIC | Age: 62
End: 2022-04-11
Payer: COMMERCIAL

## 2022-04-11 DIAGNOSIS — M25.561 PAIN IN RIGHT KNEE: ICD-10-CM

## 2022-04-11 DIAGNOSIS — M17.0 BILATERAL PRIMARY OSTEOARTHRITIS OF KNEE: ICD-10-CM

## 2022-04-11 PROCEDURE — 20610 DRAIN/INJ JOINT/BURSA W/O US: CPT | Mod: RT

## 2022-04-11 PROCEDURE — 99212 OFFICE O/P EST SF 10 MIN: CPT | Mod: 25

## 2022-04-11 NOTE — ASSESSMENT
[FreeTextEntry1] : 61-year-old with painful left total knee replacement performed about 3 to 4 years ago and has severe right knee osteoarthritis and right knee pain.  The pain has been worse in the last few days.  On exam I did not see any acute findings.  There is no reason she should have a deep vein thrombosis but if she starts to have more calf pain then getting a Doppler ultrasound certainly would be reasonable.  It seems like the pain is centered around the posterior medial knee and likely related to the severe medial compartment osteoarthritis.\par Euflexxa injection was done today.  There are 2 more injections a week apart so I will see her next week.  Ice intermittently and she can take some ibuprofen or Tylenol for the pain that she has been experiencing and continue with a cane.\par Follow-up in 1 week

## 2022-04-11 NOTE — PROCEDURE
[22] : a 22-gauge [2 mL Euflexxa___(lot #)] : 2mL Euflexxa ~Ulot# [unfilled] [___ Week(s)] : in [unfilled] week(s) [de-identified] : Euflexxa lot T1 [id number]\par Expiration 1/31/2023

## 2022-04-11 NOTE — HISTORY OF PRESENT ILLNESS
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  She had hyaluronic acid injections in the past and we were going to try them again right knee.  She has had a left total knee replacement which has been hurting as well.\par Right knee in the last few days was hurting a lot more posteriorly and into the calf.  She did not have any new injury and she has not been doing her normal activities.  She does walk with a cane.

## 2022-04-11 NOTE — PHYSICAL EXAM
[de-identified] : Bilateral knees\par Antalgic gait.\par Varus left knee.  Well-healed left knee incision.\par Mild right knee varus.\par Edema nonpitting bilateral lower legs.\par Tender bilateral knees medial joint line.  Right knee is more tender posterior medial joint.  Tender bilaterally in the calves similar.\par Right knee range of motion is 0 to about 100 degrees with pain and positive crepitus.  Normal AP and varus and valgus laxity.\par Normal neurovascular exam distally. [de-identified] : \par X-rays of bilateral knees weightbearing 4 views January 26, 2022 showed left total knee replacement with mild varus alignment without any obvious loosening.  No fracture.\par Mild left patella tilt laterally.\par Right knee has  severe bone-on-bone medial compartment osteoarthritis with productive degenerative changes including bone spurs and sclerosis and joint space narrowing.

## 2022-04-13 NOTE — REASON FOR VISIT
[Workers' Comp: Date of Injury: _______] : This visit is related to worker's compensation. Date of Injury: [unfilled] [Knee Pain] : knee pain [Knee Osteoarthritis] : knee osteoarthritis

## 2022-04-18 ENCOUNTER — APPOINTMENT (OUTPATIENT)
Dept: ORTHOPEDIC SURGERY | Facility: CLINIC | Age: 62
End: 2022-04-18
Payer: COMMERCIAL

## 2022-04-18 DIAGNOSIS — M67.88 OTHER SPECIFIED DISORDERS OF SYNOVIUM AND TENDON, OTHER SITE: ICD-10-CM

## 2022-04-18 PROCEDURE — 20610 DRAIN/INJ JOINT/BURSA W/O US: CPT | Mod: RT

## 2022-04-18 NOTE — PHYSICAL EXAM
[LE] : Sensory: Intact in bilateral lower extremities [Normal RLE] : Right Lower Extremity: No scars, rashes, lesions, ulcers, skin intact [Normal LLE] : Left Lower Extremity: No scars, rashes, lesions, ulcers, skin intact [de-identified] : Bilateral knees\par Antalgic gait.\par Varus left knee.  Well-healed left knee incision.\par Mild right knee varus.\par Edema nonpitting bilateral lower legs.\par Tender bilateral knees medial joint line.  Right knee is more tender posterior medial joint.  Tender bilaterally in the calves similar.\par Right knee range of motion is 0 to about 100 degrees with pain and positive crepitus.  Normal AP and varus and valgus laxity.\par Normal neurovascular exam distally.\par \par Tender posteriorly over the Achilles insertion.  Mild Jet's [de-identified] : \par X-rays of bilateral knees weightbearing 4 views January 26, 2022 showed left total knee replacement with mild varus alignment without any obvious loosening.  No fracture.\par Mild left patella tilt laterally.\par Right knee has  severe bone-on-bone medial compartment osteoarthritis with productive degenerative changes including bone spurs and sclerosis and joint space narrowing.

## 2022-04-18 NOTE — HISTORY OF PRESENT ILLNESS
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  We started the hyaluronic acid injections in her right knee last week.  Second injection this week.\par She has a new pain in the posterior right heel as well.

## 2022-04-18 NOTE — ASSESSMENT
[FreeTextEntry1] : 62-year-old with painful left total knee replacement performed about  4 years ago and has severe right knee osteoarthritis and right knee pain.  We have done 2 of the 3 Euflexxa injections.  She will follow-up in 1 week for the next injection.\par She also has some Achilles tendinopathy not part of the Worker's Comp. injury and not billed for.  She was given some stretches to do and she should get shoes that do not put pressure on the back of her heel and can try some heel lifts.\par Follow-up 1 week

## 2022-04-18 NOTE — PROCEDURE
[Injection] : Injection [Right] : of the right [Knee Joint] : knee joint [Osteoarthritis] : Osteoarthritis [Patient] : patient [Risk] : risk [Benefits] : benefits [Alternatives] : alternatives [Bleeding] : bleeding [Infection] : infection [Allergic Reaction] : allergic reaction [Verbal Consent Obtained] : verbal consent was obtained prior to the procedure [Alcohol] : Alcohol [Betadine] : Betadine [Ethyl Chloride Spray] : ethyl chloride spray was used as a topical anesthetic [22] : a 22-gauge [2 mL Euflexxa___(lot #)] : 2mL Euflexxa ~Ulot# [unfilled] [Bandage Applied] : a bandage [Tolerated Well] : The patient tolerated the procedure well [None] : none [No Strenuous Activity___day(s)] : avoid strenuous activity for [unfilled] day(s) [___ Week(s)] : in [unfilled] week(s) [de-identified] : Euflexxa lot T1 [id number]\par Expiration 1/31/2023 [de-identified] : Initially tried sitting position anterolaterally but difficult to feel the bony anatomy and therefore did it more supine and at the lateral patellofemoral joint and the Euflexxa was injected easily.  MRI

## 2022-04-27 ENCOUNTER — APPOINTMENT (OUTPATIENT)
Dept: ORTHOPEDIC SURGERY | Facility: CLINIC | Age: 62
End: 2022-04-27
Payer: COMMERCIAL

## 2022-04-27 DIAGNOSIS — M17.11 UNILATERAL PRIMARY OSTEOARTHRITIS, RIGHT KNEE: ICD-10-CM

## 2022-04-27 PROCEDURE — 20610 DRAIN/INJ JOINT/BURSA W/O US: CPT | Mod: RT

## 2022-04-27 NOTE — PHYSICAL EXAM
[LE] : Sensory: Intact in bilateral lower extremities [Normal RLE] : Right Lower Extremity: No scars, rashes, lesions, ulcers, skin intact [Normal LLE] : Left Lower Extremity: No scars, rashes, lesions, ulcers, skin intact [de-identified] : Bilateral knees\par Antalgic gait.\par Varus left knee.  Well-healed left knee incision.\par Mild right knee varus.\par Edema nonpitting bilateral lower legs.\par Tender bilateral knees medial joint line.  Right knee is more tender posterior medial joint.  \par Right knee range of motion is 0 to about 105 degrees with pain and positive crepitus.  Normal AP and varus and valgus laxity.\par Normal neurovascular exam distally.

## 2022-04-27 NOTE — ASSESSMENT
[FreeTextEntry1] : 62-year-old with painful left total knee replacement performed about  4 years ago and has severe right knee osteoarthritis and right knee pain.  We have done 3 of the 3 Euflexxa injections.  It may take up to 3 weeks following the steroid injection to see what kind of effect the hyaluronic acid will have on her pain.  In the meantime she should continue with strengthening/straight leg raises.  Physical therapy.  Weight loss would be very helpful.  Cane as needed.\par She will follow-up as needed.  She is going to be seeing Dr. Cordova regarding the revision left knee replacement.\par Eventually she will likely need a right total knee replacement as well.\par She will continue working.\par There is a 33% temporary impairment right knee

## 2022-04-27 NOTE — PROCEDURE
[Injection] : Injection [Right] : of the right [Knee Joint] : knee joint [Osteoarthritis] : Osteoarthritis [Patient] : patient [Risk] : risk [Benefits] : benefits [Alternatives] : alternatives [Bleeding] : bleeding [Infection] : infection [Allergic Reaction] : allergic reaction [Verbal Consent Obtained] : verbal consent was obtained prior to the procedure [Alcohol] : Alcohol [Betadine] : Betadine [Ethyl Chloride Spray] : ethyl chloride spray was used as a topical anesthetic [Lateral] : lateral [22] : a 22-gauge [2 mL Euflexxa___(lot #)] : 2mL Euflexxa ~Ulot# [unfilled] [Bandage Applied] : a bandage [Tolerated Well] : The patient tolerated the procedure well [None] : none [No Strenuous Activity___day(s)] : avoid strenuous activity for [unfilled] day(s) [PRN] : as needed [de-identified] : Euflexxa lot T1 [id number]\par Expiration 1/31/2023

## 2022-04-27 NOTE — HISTORY OF PRESENT ILLNESS
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 3 rd HA injection, Euflexxa is being done today in the right knee.\par She is working on losing weight and is continuing with the physical therapy.  Both knees have been hurting a lot.  She is seeing Dr. Cordova regarding revision left total knee replacement

## 2022-04-27 NOTE — REASON FOR VISIT
[Workers' Comp: Date of Injury: _______] : This visit is related to worker's compensation. Date of Injury: [unfilled] [Knee Osteoarthritis] : knee osteoarthritis [Knee Injury] : knee injury [Follow-Up Visit] : a follow-up visit for

## 2022-05-04 ENCOUNTER — OUTPATIENT (OUTPATIENT)
Dept: OUTPATIENT SERVICES | Facility: HOSPITAL | Age: 62
LOS: 1 days | Discharge: HOME | End: 2022-05-04
Payer: COMMERCIAL

## 2022-05-04 DIAGNOSIS — Z98.890 OTHER SPECIFIED POSTPROCEDURAL STATES: Chronic | ICD-10-CM

## 2022-05-04 DIAGNOSIS — Z90.710 ACQUIRED ABSENCE OF BOTH CERVIX AND UTERUS: Chronic | ICD-10-CM

## 2022-05-04 DIAGNOSIS — Z90.49 ACQUIRED ABSENCE OF OTHER SPECIFIED PARTS OF DIGESTIVE TRACT: Chronic | ICD-10-CM

## 2022-05-04 DIAGNOSIS — R53.83 OTHER FATIGUE: ICD-10-CM

## 2022-05-04 DIAGNOSIS — Z96.652 PRESENCE OF LEFT ARTIFICIAL KNEE JOINT: Chronic | ICD-10-CM

## 2022-05-04 PROCEDURE — 93970 EXTREMITY STUDY: CPT | Mod: 26

## 2022-05-10 ENCOUNTER — RESULT REVIEW (OUTPATIENT)
Age: 62
End: 2022-05-10

## 2022-05-10 ENCOUNTER — OUTPATIENT (OUTPATIENT)
Dept: OUTPATIENT SERVICES | Facility: HOSPITAL | Age: 62
LOS: 1 days | Discharge: HOME | End: 2022-05-10
Payer: COMMERCIAL

## 2022-05-10 ENCOUNTER — NON-APPOINTMENT (OUTPATIENT)
Age: 62
End: 2022-05-10

## 2022-05-10 DIAGNOSIS — Z98.890 OTHER SPECIFIED POSTPROCEDURAL STATES: Chronic | ICD-10-CM

## 2022-05-10 DIAGNOSIS — Z90.710 ACQUIRED ABSENCE OF BOTH CERVIX AND UTERUS: Chronic | ICD-10-CM

## 2022-05-10 DIAGNOSIS — Z90.49 ACQUIRED ABSENCE OF OTHER SPECIFIED PARTS OF DIGESTIVE TRACT: Chronic | ICD-10-CM

## 2022-05-10 DIAGNOSIS — R92.8 OTHER ABNORMAL AND INCONCLUSIVE FINDINGS ON DIAGNOSTIC IMAGING OF BREAST: ICD-10-CM

## 2022-05-10 DIAGNOSIS — Z96.652 PRESENCE OF LEFT ARTIFICIAL KNEE JOINT: Chronic | ICD-10-CM

## 2022-05-10 PROCEDURE — 76642 ULTRASOUND BREAST LIMITED: CPT | Mod: 26,LT

## 2022-05-10 PROCEDURE — G0279: CPT | Mod: 26

## 2022-05-10 PROCEDURE — 77065 DX MAMMO INCL CAD UNI: CPT | Mod: 26,LT

## 2022-05-16 ENCOUNTER — OUTPATIENT (OUTPATIENT)
Dept: OUTPATIENT SERVICES | Facility: HOSPITAL | Age: 62
LOS: 1 days | Discharge: HOME | End: 2022-05-16
Payer: COMMERCIAL

## 2022-05-16 ENCOUNTER — RESULT REVIEW (OUTPATIENT)
Age: 62
End: 2022-05-16

## 2022-05-16 DIAGNOSIS — Z98.890 OTHER SPECIFIED POSTPROCEDURAL STATES: Chronic | ICD-10-CM

## 2022-05-16 DIAGNOSIS — Z90.710 ACQUIRED ABSENCE OF BOTH CERVIX AND UTERUS: Chronic | ICD-10-CM

## 2022-05-16 DIAGNOSIS — Z96.652 PRESENCE OF LEFT ARTIFICIAL KNEE JOINT: Chronic | ICD-10-CM

## 2022-05-16 DIAGNOSIS — Z90.49 ACQUIRED ABSENCE OF OTHER SPECIFIED PARTS OF DIGESTIVE TRACT: Chronic | ICD-10-CM

## 2022-05-16 PROCEDURE — 76098 X-RAY EXAM SURGICAL SPECIMEN: CPT | Mod: 26

## 2022-05-16 PROCEDURE — 19081 BX BREAST 1ST LESION STRTCTC: CPT | Mod: LT

## 2022-05-16 PROCEDURE — 88305 TISSUE EXAM BY PATHOLOGIST: CPT | Mod: 26

## 2022-05-17 ENCOUNTER — NON-APPOINTMENT (OUTPATIENT)
Age: 62
End: 2022-05-17

## 2022-05-19 DIAGNOSIS — R92.1 MAMMOGRAPHIC CALCIFICATION FOUND ON DIAGNOSTIC IMAGING OF BREAST: ICD-10-CM

## 2022-05-19 DIAGNOSIS — N64.2 ATROPHY OF BREAST: ICD-10-CM

## 2022-05-23 ENCOUNTER — FORM ENCOUNTER (OUTPATIENT)
Age: 62
End: 2022-05-23

## 2022-06-02 ENCOUNTER — OUTPATIENT (OUTPATIENT)
Dept: OUTPATIENT SERVICES | Facility: HOSPITAL | Age: 62
LOS: 1 days | Discharge: HOME | End: 2022-06-02

## 2022-06-02 DIAGNOSIS — M17.11 UNILATERAL PRIMARY OSTEOARTHRITIS, RIGHT KNEE: ICD-10-CM

## 2022-06-02 DIAGNOSIS — T84.84XD PAIN DUE TO INTERNAL ORTHOPEDIC PROSTHETIC DEVICES, IMPLANTS AND GRAFTS, SUBSEQUENT ENCOUNTER: ICD-10-CM

## 2022-06-02 DIAGNOSIS — Z90.710 ACQUIRED ABSENCE OF BOTH CERVIX AND UTERUS: Chronic | ICD-10-CM

## 2022-06-02 DIAGNOSIS — Z90.49 ACQUIRED ABSENCE OF OTHER SPECIFIED PARTS OF DIGESTIVE TRACT: Chronic | ICD-10-CM

## 2022-06-02 DIAGNOSIS — T84.84XA PAIN DUE TO INTERNAL ORTHOPEDIC PROSTHETIC DEVICES, IMPLANTS AND GRAFTS, INITIAL ENCOUNTER: ICD-10-CM

## 2022-06-02 DIAGNOSIS — Z98.890 OTHER SPECIFIED POSTPROCEDURAL STATES: Chronic | ICD-10-CM

## 2022-06-02 DIAGNOSIS — Z96.652 PRESENCE OF LEFT ARTIFICIAL KNEE JOINT: Chronic | ICD-10-CM

## 2022-06-08 ENCOUNTER — APPOINTMENT (OUTPATIENT)
Dept: RADIATION ONCOLOGY | Facility: HOSPITAL | Age: 62
End: 2022-06-08

## 2022-06-08 ENCOUNTER — OUTPATIENT (OUTPATIENT)
Dept: OUTPATIENT SERVICES | Facility: HOSPITAL | Age: 62
LOS: 1 days | Discharge: HOME | End: 2022-06-08

## 2022-06-08 ENCOUNTER — APPOINTMENT (OUTPATIENT)
Dept: RADIATION ONCOLOGY | Facility: HOSPITAL | Age: 62
End: 2022-06-08
Payer: COMMERCIAL

## 2022-06-08 VITALS
TEMPERATURE: 97.2 F | SYSTOLIC BLOOD PRESSURE: 141 MMHG | BODY MASS INDEX: 47.89 KG/M2 | WEIGHT: 279 LBS | RESPIRATION RATE: 16 BRPM | HEART RATE: 72 BPM | OXYGEN SATURATION: 99 % | DIASTOLIC BLOOD PRESSURE: 81 MMHG

## 2022-06-08 DIAGNOSIS — Z90.710 ACQUIRED ABSENCE OF BOTH CERVIX AND UTERUS: Chronic | ICD-10-CM

## 2022-06-08 DIAGNOSIS — Z98.890 OTHER SPECIFIED POSTPROCEDURAL STATES: Chronic | ICD-10-CM

## 2022-06-08 DIAGNOSIS — Z90.49 ACQUIRED ABSENCE OF OTHER SPECIFIED PARTS OF DIGESTIVE TRACT: Chronic | ICD-10-CM

## 2022-06-08 DIAGNOSIS — Z96.652 PRESENCE OF LEFT ARTIFICIAL KNEE JOINT: Chronic | ICD-10-CM

## 2022-06-08 PROCEDURE — 99213 OFFICE O/P EST LOW 20 MIN: CPT

## 2022-06-08 RX ORDER — CEPHALEXIN 500 MG/1
500 CAPSULE ORAL
Qty: 14 | Refills: 0 | Status: DISCONTINUED | COMMUNITY
Start: 2022-04-14

## 2022-06-08 RX ORDER — DOXYCYCLINE HYCLATE 100 MG/1
100 CAPSULE ORAL
Qty: 14 | Refills: 0 | Status: DISCONTINUED | COMMUNITY
Start: 2022-04-29

## 2022-06-08 NOTE — LETTER CLOSING
[Consult Closing:] : Thank you for allowing me to participate in the care of this patient.  If you have any questions, please do not hesitate to contact me. [Sincerely yours,] : Sincerely yours, [FreeTextEntry3] : Meliza Oswald M.D. \par \par Electronically proofread and signed by:  Meliza Oswald MD\par Attending, Department of Radiation Medicine\par [facility]\par \par CC: Dr. Joseph

## 2022-06-08 NOTE — PHYSICAL EXAM
[Sclera] : the sclera and conjunctiva were normal [Hearing Threshold Finger Rub Not Sharp] : hearing was normal [Heart Rate And Rhythm] : heart rate and rhythm were normal [Heart Sounds] : normal S1 and S2 [Cervical Lymph Nodes Enlarged Posterior Bilaterally] : posterior cervical [Cervical Lymph Nodes Enlarged Anterior Bilaterally] : anterior cervical [Supraclavicular Lymph Nodes Enlarged Bilaterally] : supraclavicular [Axillary Lymph Nodes Enlarged Bilaterally] : axillary [Normal] : oriented to person, place and time, the affect was normal, the mood was normal and not anxious [de-identified] : She is examined in the upright position.  The right breast was unremarkable.   The left breast had minimal volume loss and recent biopsy site is well healed. .  No palpable mass in either breast.

## 2022-06-08 NOTE — HISTORY OF PRESENT ILLNESS
[FreeTextEntry1] :  \par CORY RECIO  returns to clinic in follow up visit.  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 She is s/p breast conserving surgery.  She received 5240cGy to the left breast from 3/16/2020 through 4/6/2020 without any complications.  I last saw her in May 2021. She continues to take anastrozole with complaints of joint pain.  She denies pain at treatment site. 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 She is being treated with Eliquis.\par \par Left breast diagnostic mammogram/ultrasound on 5/10/2022 showed developing calcifications at the lumpectomy scar are suspicious. Stereotactic guided biopsy recommended. \par \par On 5/16/2022 a biopsy was done of the left breast calcifications surrounding the lumpectomy site.\par Final diagnosis:\par - Benign atrophic fatty breast tissue with remote postsurgical site\par changes associated with stromal calcifications.\par - Radiographic/pathologic correlation with the specimen's digital image\par viewed on PACS is performed.\par \par Dr. Castillo 6/30/2022\par Dr. Joseph 6/9/2022

## 2022-06-08 NOTE — DISEASE MANAGEMENT
[Pathological] : TNM Stage: p [0] : 0 [FreeTextEntry4] : Intermediate grade DCIS of the left breast, ER/WV positive.   [TTNM] : is [NTNM] : 0 [MTNM] : 0

## 2022-06-09 ENCOUNTER — OUTPATIENT (OUTPATIENT)
Dept: OUTPATIENT SERVICES | Facility: HOSPITAL | Age: 62
LOS: 1 days | Discharge: HOME | End: 2022-06-09

## 2022-06-09 ENCOUNTER — LABORATORY RESULT (OUTPATIENT)
Age: 62
End: 2022-06-09

## 2022-06-09 ENCOUNTER — APPOINTMENT (OUTPATIENT)
Dept: HEMATOLOGY ONCOLOGY | Facility: CLINIC | Age: 62
End: 2022-06-09
Payer: COMMERCIAL

## 2022-06-09 VITALS
SYSTOLIC BLOOD PRESSURE: 169 MMHG | HEART RATE: 76 BPM | TEMPERATURE: 97.3 F | WEIGHT: 273 LBS | BODY MASS INDEX: 46.61 KG/M2 | DIASTOLIC BLOOD PRESSURE: 75 MMHG | HEIGHT: 64 IN

## 2022-06-09 DIAGNOSIS — Z96.652 PRESENCE OF LEFT ARTIFICIAL KNEE JOINT: Chronic | ICD-10-CM

## 2022-06-09 DIAGNOSIS — Z98.890 OTHER SPECIFIED POSTPROCEDURAL STATES: Chronic | ICD-10-CM

## 2022-06-09 DIAGNOSIS — Z90.710 ACQUIRED ABSENCE OF BOTH CERVIX AND UTERUS: Chronic | ICD-10-CM

## 2022-06-09 DIAGNOSIS — Z79.811 LONG TERM (CURRENT) USE OF AROMATASE INHIBITORS: ICD-10-CM

## 2022-06-09 DIAGNOSIS — Z90.49 ACQUIRED ABSENCE OF OTHER SPECIFIED PARTS OF DIGESTIVE TRACT: Chronic | ICD-10-CM

## 2022-06-09 DIAGNOSIS — D05.12 INTRADUCTAL CARCINOMA IN SITU OF LEFT BREAST: ICD-10-CM

## 2022-06-09 DIAGNOSIS — Z51.81 ENCOUNTER FOR THERAPEUTIC DRUG LVL MONITORING: ICD-10-CM

## 2022-06-09 PROCEDURE — 99214 OFFICE O/P EST MOD 30 MIN: CPT

## 2022-06-12 PROBLEM — Z51.81 ENCOUNTER FOR MEDICATION MONITORING: Status: ACTIVE | Noted: 2022-06-09

## 2022-06-12 NOTE — PHYSICAL EXAM
[Fully active, able to carry on all pre-disease performance without restriction] : Status 0 - Fully active, able to carry on all pre-disease performance without restriction [Normal] : affect appropriate [de-identified] : Status post left breast lumpectomy. Surgical scar is healing well. There is no palpable abnormality. [de-identified] : no tenderness to palpation BLE, superficial ulcer to right medial ankle visualized s/p Mohl's surgery

## 2022-06-12 NOTE — ASSESSMENT
[FreeTextEntry1] : Assessment and Plan:\par \par # DVT right peroneal vein.\par -- Continue Eliquis 5 mg q12h.\par -- Will order hypercoagulable workup:  Anticardiolipin IgG, Anticardiolipin IgM, Antithrombin III Assay, Beta 2 Glycoprotein 1 IgG and IgA, Factor V Leiden, Protein C Activity, Protein S Antigen Assay, Protein S Free Activity, Prothrombin Gene Mutation\par \par #  Left breast breast DCIS, intermediate nuclear grade, ER/KS positive, s/p lumpectomy with negative margins. \par -- Continue Anastrozole daily.\par -- Breast exam did not reveal palpable abnormality. She will resume annual screening mammo in 11/2022. \par -- She is given a referral for repeat bone density. Continue calcium and vitamin D supplement and physical activity.  \par -- Followup with orthopedic for knee problems.\par -- Followup with Dr. Castillo and Dr. Oswald as indicated.\par -- RTO for followup in 6 months.\par \par Case was seen and discussed with Dr. Joseph who agreed with the assessment and plan.\par \par \par

## 2022-06-12 NOTE — HISTORY OF PRESENT ILLNESS
[de-identified] : 58 yo female is referred by Dr. Stevens for consultation of adjuvant endocrine therapy. The patient had a screening mammogram on 12/12/19 which showed mass and calcifications in the left breast requiring additional evaluation. On 12/20/19, left breast dx mammo and US showed segmental calcifications in the UOQ of the left breast spanning over about 5 cm. The previously noted apparent mass persisted on spot compression views and corresponded with a benign cyst seen on concurrent US.\par \par On 12/24/19, stereotactic core biopsy of LUOQ, Posterior and anterior calcifications, Calcifications span 5 CM both revealed DCIS solid and cribriform types with comedo necrosis and calcifications, intermediate nuclear grade. \par Estrogen receptor positive, 95%\par Progesterone receptor positive, 75%\par \par Her family history is significant for her maternal aunt with breast cancer in her 70's; her maternal first cousin with breast cancer in her 50's; her paternal first cousin with breast cancer in her 50's. 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 No genetic testing in the patient or her family. \par \par On 1/27/2020, she underwent Left NLOC. The pathology revealed 2 healing prior biopsy sites with widespread DCIS, micropapillary and comedo types associated with calcifications, intermediated nuclear grade and present in 21/35 slides. The inferiore margin was involved and lateral margin was 1.0 mm. Reexcision of left inferior and lateral margins were negative. \par \par The patient recovered well from surgery. She is here today to discuss adjuvant endocrine therapy.\par \par She had RATLH/BSO for persistent PMB in 11/2019. The pathology revealed benign endometrial polyps, corpus uteri showing intramural leiomyomas and ovaries without significant pathologic change.\par  [de-identified] : 7/27/2020:\par The patient is here for followup visit. She was diagnosed with  left breast breast DCIS, intermediate nuclear grade, ER/TX positive, s/p lumpectomy with negative margins. She received adjuvant WBI with 5240cGy to the left breast from 3/16/2020 through 4/6/2020 without any complications. She has been taking Anastrozole daily since 4/2020 and tolerated well. She had bone density in 2/2020 which was within normal limit. She does not have breast related complains. \par \par 1/28/2021:\par The patient is here for followup visit. She was diagnosed with  left breast breast DCIS, intermediate nuclear grade, ER/TX positive, s/p lumpectomy on 1/27/2020 with negative margins. She received adjuvant WBI with 5240cGy to the left breast from 3/16/2020 through 4/6/2020 without any complications. She has been taking Anastrozole daily since 4/2020 and tolerated well. She had bone density in 2/2020 which was within normal limit. She does not have breast related complains. On 12/14/2020, she had b/l dx mammo and US which showed post left lumpectomy and radiation therapy with expected posttreatment changes in. No abnormal clusters of microcalcifications. Recommendation: Follow-up unilateral diagnostic mammogram and left ultrasound in 6 months.  Bone Density from 2/19/20 was normal.\par \par 8/23/21:\par The patient is here for followup visit. She was diagnosed with  left breast breast DCIS, intermediate nuclear grade, ER/TX positive, s/p lumpectomy on 1/27/2020 with negative margins. She received adjuvant WBI with 5240cGy to the left breast from 3/16/2020 through 4/6/2020. She has been taking Anastrozole daily since 4/2020 and tolerated well. She had bone density in 2/2020 which was within normal limit. On 6/14/21, she had left breast dx mammo and US which showed stable architectural distortion most consistent with postsurgical change/fat necrosis. Bone Density from 2/19/20 was normal. She complains feeling itching under her both breast. \par \par 2/3/22:\par The patient is here for followup visit. She has left breast breast DCIS, intermediate nuclear grade, ER/TX positive, s/p lumpectomy on 1/27/2020 with negative margins. She received adjuvant WBI with 5240cGy to the left breast from 3/16/2020 through 4/6/2020. She has been taking Anastrozole daily since 4/2020 and tolerated well.  On 11/15/21, she had b/l breast dx mammo and US which showed stable architectural distortion most consistent with postsurgical change/fat necrosis. Bone Density from 2/19/20 was normal. She has knee pain and has been followed with her orthopedic doctor. \par \par 6/9/22\par The patient is here today for a new diagnosis for DVT right peroneal vein 5/4/22.  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  She developed DVT after surgery. She is presently on Eliquis 5mg PO bid. She also has left breast breast DCIS, intermediate nuclear grade, ER/TX positive, s/p lumpectomy on 1/27/2020 with negative margins. She received adjuvant WBI with 5240cGy to the left breast from 3/16/2020 through 4/6/2020. She has been taking Anastrozole daily since 4/2020 and tolerated well.  On 11/15/21, she had b/l breast dx mammo and US which showed stable architectural distortion most consistent with postsurgical change/fat necrosis. Bone Density from 2/19/20 was normal. Rx was given last visit - not done yet 2/2022. She has knee pain and has been followed with her orthopedic doctor.

## 2022-06-13 DIAGNOSIS — D05.12 INTRADUCTAL CARCINOMA IN SITU OF LEFT BREAST: ICD-10-CM

## 2022-06-13 DIAGNOSIS — I82.409 ACUTE EMBOLISM AND THROMBOSIS OF UNSPECIFIED DEEP VEINS OF UNSPECIFIED LOWER EXTREMITY: ICD-10-CM

## 2022-06-13 DIAGNOSIS — Z51.81 ENCOUNTER FOR THERAPEUTIC DRUG LEVEL MONITORING: ICD-10-CM

## 2022-06-14 LAB
AT III PPP CHRO-ACNC: 108 %
B2 GLYCOPROT1 IGA SERPL IA-ACNC: <5 SAU
B2 GLYCOPROT1 IGG SER-ACNC: <5 SGU
CARDIOLIPIN IGM SER-MCNC: 20.4 MPL
CARDIOLIPIN IGM SER-MCNC: 9.5 GPL
DNA PLOIDY SPEC FC-IMP: NORMAL
HCT VFR BLD CALC: 36.4 %
HGB BLD-MCNC: 11.7 G/DL
MCHC RBC-ENTMCNC: 20.5 PG
MCHC RBC-ENTMCNC: 32.1 G/DL
MCV RBC AUTO: 63.9 FL
PLATELET # BLD AUTO: 259 K/UL
PMV BLD: 9.9 FL
PROT S AG ACT/NOR PPP IA: 105 %
PROT S FREE PPP-ACNC: 96 %
PTR INTERP: NORMAL
RBC # BLD: 5.7 M/UL
RBC # FLD: 17.1 %
WBC # FLD AUTO: 5.83 K/UL

## 2022-06-30 ENCOUNTER — APPOINTMENT (OUTPATIENT)
Dept: BREAST CENTER | Facility: CLINIC | Age: 62
End: 2022-06-30

## 2022-06-30 VITALS
BODY MASS INDEX: 45.75 KG/M2 | HEIGHT: 64 IN | SYSTOLIC BLOOD PRESSURE: 112 MMHG | DIASTOLIC BLOOD PRESSURE: 93 MMHG | WEIGHT: 268 LBS

## 2022-06-30 PROCEDURE — 99212 OFFICE O/P EST SF 10 MIN: CPT

## 2022-06-30 NOTE — ASSESSMENT
[FreeTextEntry1] : CORY is a david 62 year old patient who presented today in follow up for a history of left breast DCIS. \par She has been doing well with no new breast related complaints. \par \par Physical exam was unrevealing today.\par \par Her imaging is as follows:\par 05/10/2022 left dx mammo and US\par -scattered areas of fibroglandular density.\par -status post a left lumpectomy with multiple calcifications in the region of the scar. These have a similar appearance to the calcifications seen prior to surgery. These span approximately 4.1 cm AP-->stereotactic guided biopsy is recommended.\par BI-RADS4\par \par 05/16/2022 LEFT stereotactic bx (mini-cork)\par -Benign atrophic fatty breast tissue with remote postsurgical site changes associated with stromal calcifications.\par \par Imaging with a left unilateral diagnostic mammogram and sonogram will be ordered for May 2022, \par and that will be scheduled today. \par She will return for follow-up and clinical breast exam following imaging. \par \par PLAN:\par - b/l Dx Mammo- November 2022.\par -f/u after

## 2022-06-30 NOTE — DATA REVIEWED
[FreeTextEntry1] : ACC: [id number]     EXAM:  MG US BREAST LIMITED LT\par ACC: [id number]     EXAM:  MG MAMMO DIAG W WINSTON LT#\par \par PROCEDURE DATE:  05/10/2022\par \par \par \par INTERPRETATION:  Clinical History / Reason for exam: Patient presents for follow-up as she is status post a left lumpectomy in January 2020.\par \par The patient reports her last clinical breast examination was performed within the past year\par \par Diagnostic left mammogram was performed and submitted for evaluation.\par \par Computer-aided detection was utilized in the interpretation of this examination.\par \par Comparison is made to the prior studies dating back to 2018.\par \par Breast composition: There are scattered areas of fibroglandular density.\par \par The patient is status post a left lumpectomy with multiple calcifications in the region of the scar. These have a similar appearance to the calcifications seen prior to surgery. These span approximately 4.1 cm AP.\par Therefore, stereotactic guided biopsy is recommended.\par \par Targeted left Breast Sonogram:\par \par Again identified at the left lumpectomy site in the upper outer quadrant  nipple is an oval circumscribed hyperechoic mass which is without significant change consistent with fat necrosis.\par \par \par Impression:\par \par \par Developing calcifications at the lumpectomy scar are suspicious.\par \par Recommendation: Stereotactic guided biopsy.\par \par BI-RADS Category 4: Suspicious\par \par ACC: [id number]     EXAM:  MG STEREO BX 1ST LT SISC\par \par *** ADDENDUM***\par \par Pathology:\par \par -Benign atrophic fatty breast tissue with remote postsurgical site changes associated with stromal calcifications.\par \par This is benign, concordant histology. The patient will be due for a bilateral diagnostic mammogram in 6 months. Findings and recommendations were discussed with the patient on 5/17/2022.\par \par --- End of Report ---\par \par *** END OF ADDENDUM***\par \par \par PROCEDURE DATE:  05/16/2022\par \par \par \par INTERPRETATION:  CLINICAL HISTORY: Left breast calcifications.\par \par PROCEDURES: Left breast stereotactic vacuum assisted core biopsy and post clip placement two view left mammogram.\par \par TECHNIQUE: Previous films and reports were reviewed. The procedure, its risks, benefits, and alternatives were explained to the patient, who expressed understanding and gave informed written and verbal consent. A time-out, which included the patient's full name, date of birth, description of the expected procedure and procedure site, was performed immediately before the procedure.\par \par A superior approach was selected for the procedure. Using the usual sterile technique and stereotactic guidance, the previously described calcifications in the left breast, upper-outer quadrant, were biopsied using a 9 gauge vacuum-assisted device.  A single pass was made and 6 samples were collected.  Specimen radiography demonstrated the calcifications to be contained within the specimen.  A (Nosto mini-cork) localizing clip was then placed into the biopsy cavity. Hemostasis was obtained and a sterile dressing was applied.\par \par A unilateral left mammogram performed in the CC and lateral projections demonstrates the biopsy clip to be appropriately positioned.\par \par The patient tolerated the procedure well and left the department in good condition. Written discharge instructions were discussed and provided to the patient.\par \par IMPRESSION:\par \par Successful stereotactic guided biopsy of the left breast.\par \par \par Pathology: Pending, to be reported as an addendum.\par \par

## 2022-06-30 NOTE — HISTORY OF PRESENT ILLNESS
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  \par Estrogen receptor positive, 95\par Progesterone receptor positive, 75\par \par Left breast DCIS, micropapillary and solid types with comedo necrosis and calcifications, intermediate nuclear grade on stereotactic core biopsy 12/24/19; LUOQ, Anterior calcifications, Calcifications span 5 CM (tophat). \par Estrogen receptor positive, 95\par Progesterone receptor positive, 75\par \par No prior complaints related to the breasts. \par Her family history is significant for her maternal aunt with breast cancer in her 70's; her maternal first cousin with breast cancer in her 50's; her paternal first cousin with breast cancer in her 50's. 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  No genetic testing in the patient or her family.  \par \par s/p Left NLOC x 2 - 01/27/2020 = Two (2) healing prior bx sites with widespread DCIS), micropapillary and comedo types associated with calcifications, intermediate nuclear grade.\par -  AJCC 8th Edition Pathologic Stage: pTis(DCIS), pN0, pMx\par \par Dr. Solo Joseph - Anastrozole\par Dr. Meliza Oswald - (+) RT to the left breast (03/16-04/06/2020)\par \par CORY RECIO is a 61 year old female patient who presents today in follow up for left breast DCIS.\par Since her last visit, she has no new breast related complaints - she continues to have sporadic breast pain. \par \par Imaging with a bilateral diagnostic mammogram and sonogram was done on 11/15/2021, which revealed there are scattered areas of fibroglandular density. The patient is status post a left lumpectomy with stable architectural distortion most consistent with postsurgical change.  \par \par Targeted Bilateral Breast Sonogram: Again identified at the left lumpectomy site which is at the 1-2 o'clock location 8 cm from the nipple is an oval circumscribed hyperechoic mass which is without significant change measuring 1.0 cm x 1.0 cm x 0.7 cm consistent with fat necrosis.\par BI-RADS Category 2: Benign\par \par INTERVAL HISTORY 6/30/22\par Cory is here for her six months follow up visit\par She has no breast related complaints at this time.  She denies any breast pain, has not palpated any new palpable masses in either breast and denies any nipple discharge or retraction.\par \par Her imaging is as follows:\par 05/10/2022 left dx mammo and US\par -scattered areas of fibroglandular density.\par -status post a left lumpectomy with multiple calcifications in the region of the scar. These have a similar appearance to the calcifications seen prior to surgery. These span approximately 4.1 cm AP-->stereotactic guided biopsy is recommended.\par BI-RADS4\par \par 05/16/2022 LEFT stereotactic bx (mini-cork)\par -Benign atrophic fatty breast tissue with remote postsurgical site changes associated with stromal calcifications.\par

## 2022-06-30 NOTE — PHYSICAL EXAM
[Normocephalic] : normocephalic [Atraumatic] : atraumatic [EOMI] : extra ocular movement intact [Examined in the supine and seated position] : examined in the supine and seated position [Symmetrical] : symmetrical [No dominant masses] : no dominant masses in right breast  [No dominant masses] : no dominant masses left breast [No Nipple Retraction] : no left nipple retraction [No Nipple Discharge] : no left nipple discharge [No Axillary Lymphadenopathy] : no left axillary lymphadenopathy [No Edema] : no edema [No Rashes] : no rashes [No Ulceration] : no ulceration

## 2022-07-05 ENCOUNTER — APPOINTMENT (OUTPATIENT)
Dept: VASCULAR SURGERY | Facility: CLINIC | Age: 62
End: 2022-07-05

## 2022-07-05 VITALS — HEIGHT: 64 IN | WEIGHT: 268 LBS | BODY MASS INDEX: 45.75 KG/M2

## 2022-07-05 VITALS — SYSTOLIC BLOOD PRESSURE: 136 MMHG | DIASTOLIC BLOOD PRESSURE: 86 MMHG

## 2022-07-05 DIAGNOSIS — Z86.718 PERSONAL HISTORY OF OTHER VENOUS THROMBOSIS AND EMBOLISM: ICD-10-CM

## 2022-07-05 PROCEDURE — 93970 EXTREMITY STUDY: CPT

## 2022-07-05 PROCEDURE — 99214 OFFICE O/P EST MOD 30 MIN: CPT

## 2022-07-05 NOTE — DATA REVIEWED
[FreeTextEntry1] : I performed a venous duplex which was medically necessary to evaluate for DVT. No DVT noted in the lower extremities bilaterally.\par

## 2022-07-05 NOTE — HISTORY OF PRESENT ILLNESS
[FreeTextEntry1] : 61 y/o female who was diagnosed with right peroneal vein DVT on 5/4/22, on Eliquis for 2 months now. She has limited ROM of right knee and hip due to arthritis and was undergoing physical therapy. No prior h/o DVT.

## 2022-07-05 NOTE — ASSESSMENT
[FreeTextEntry1] : 61 y/o female with right calf DVT, on Eliquis for 2 months now.\par \par Duplex today showed no evidence of DVT in the lower extremities bilaterally.\par \par I recommend that she complete 3 months of anticoagulation. I will see her back in 4 weeks for repeat venous duplex.\par \par I spent 45 minutes with patient performing physical exam, reviewing duplex results, discussing treatment plan and followup.\par

## 2022-07-29 ENCOUNTER — RESULT REVIEW (OUTPATIENT)
Age: 62
End: 2022-07-29

## 2022-07-29 ENCOUNTER — OUTPATIENT (OUTPATIENT)
Dept: OUTPATIENT SERVICES | Facility: HOSPITAL | Age: 62
LOS: 1 days | Discharge: HOME | End: 2022-07-29

## 2022-07-29 DIAGNOSIS — Z90.710 ACQUIRED ABSENCE OF BOTH CERVIX AND UTERUS: Chronic | ICD-10-CM

## 2022-07-29 DIAGNOSIS — Z98.890 OTHER SPECIFIED POSTPROCEDURAL STATES: Chronic | ICD-10-CM

## 2022-07-29 DIAGNOSIS — Z90.49 ACQUIRED ABSENCE OF OTHER SPECIFIED PARTS OF DIGESTIVE TRACT: Chronic | ICD-10-CM

## 2022-07-29 DIAGNOSIS — Z96.652 PRESENCE OF LEFT ARTIFICIAL KNEE JOINT: Chronic | ICD-10-CM

## 2022-07-29 DIAGNOSIS — Z96.652 PRESENCE OF LEFT ARTIFICIAL KNEE JOINT: ICD-10-CM

## 2022-07-29 PROCEDURE — 73562 X-RAY EXAM OF KNEE 3: CPT | Mod: 26,50

## 2022-07-29 PROCEDURE — 73721 MRI JNT OF LWR EXTRE W/O DYE: CPT | Mod: 26,LT

## 2022-08-02 ENCOUNTER — APPOINTMENT (OUTPATIENT)
Dept: VASCULAR SURGERY | Facility: CLINIC | Age: 62
End: 2022-08-02

## 2022-08-02 DIAGNOSIS — M79.89 PAIN IN RIGHT LOWER LEG: ICD-10-CM

## 2022-08-02 DIAGNOSIS — M79.661 PAIN IN RIGHT LOWER LEG: ICD-10-CM

## 2022-08-02 PROCEDURE — 93971 EXTREMITY STUDY: CPT

## 2022-08-02 PROCEDURE — 99214 OFFICE O/P EST MOD 30 MIN: CPT

## 2022-08-02 NOTE — HISTORY OF PRESENT ILLNESS
[FreeTextEntry1] : 61 y/o female who was diagnosed with right peroneal vein DVT on 5/4/22, on Eliquis for 3 months now. She has limited ROM of right knee and hip due to arthritis and was undergoing physical therapy. No prior h/o DVT.

## 2022-08-02 NOTE — ASSESSMENT
[FreeTextEntry1] : 63 y/o female with right calf DVT, on Eliquis for 3 months now.\par \par Duplex today showed no evidence of acute DVT in the right lower extremity, chronic changes in the right peroneal vein.\par \par I recommend that she discontinue anticoagulation and follow up in 6 months for repeat venous duplex.

## 2022-08-02 NOTE — DATA REVIEWED
[FreeTextEntry1] : I performed a venous duplex which was medically necessary to evaluate for DVT. No acute DVT noted in the right lower extremity, chronic changes in the right peroneal vein.\par

## 2022-08-12 ENCOUNTER — NON-APPOINTMENT (OUTPATIENT)
Age: 62
End: 2022-08-12

## 2022-08-28 ENCOUNTER — NON-APPOINTMENT (OUTPATIENT)
Age: 62
End: 2022-08-28

## 2022-10-03 ENCOUNTER — APPOINTMENT (OUTPATIENT)
Dept: OBGYN | Facility: CLINIC | Age: 62
End: 2022-10-03

## 2022-10-03 ENCOUNTER — OUTPATIENT (OUTPATIENT)
Dept: OUTPATIENT SERVICES | Facility: HOSPITAL | Age: 62
LOS: 1 days | Discharge: HOME | End: 2022-10-03

## 2022-10-03 VITALS
DIASTOLIC BLOOD PRESSURE: 82 MMHG | WEIGHT: 268 LBS | SYSTOLIC BLOOD PRESSURE: 128 MMHG | HEIGHT: 64 IN | BODY MASS INDEX: 45.75 KG/M2

## 2022-10-03 DIAGNOSIS — B97.7 PAPILLOMAVIRUS AS THE CAUSE OF DISEASES CLASSIFIED ELSEWHERE: ICD-10-CM

## 2022-10-03 DIAGNOSIS — Z86.718 PERSONAL HISTORY OF OTHER VENOUS THROMBOSIS AND EMBOLISM: ICD-10-CM

## 2022-10-03 DIAGNOSIS — Z90.49 ACQUIRED ABSENCE OF OTHER SPECIFIED PARTS OF DIGESTIVE TRACT: Chronic | ICD-10-CM

## 2022-10-03 DIAGNOSIS — Z09 ENCOUNTER FOR FOLLOW-UP EXAMINATION AFTER COMPLETED TREATMENT FOR CONDITIONS OTHER THAN MALIGNANT NEOPLASM: ICD-10-CM

## 2022-10-03 DIAGNOSIS — Z96.652 PRESENCE OF LEFT ARTIFICIAL KNEE JOINT: Chronic | ICD-10-CM

## 2022-10-03 DIAGNOSIS — N84.0 POLYP OF CORPUS UTERI: ICD-10-CM

## 2022-10-03 DIAGNOSIS — Z86.000 PERSONAL HISTORY OF IN-SITU NEOPLASM OF BREAST: ICD-10-CM

## 2022-10-03 DIAGNOSIS — Z98.890 OTHER SPECIFIED POSTPROCEDURAL STATES: Chronic | ICD-10-CM

## 2022-10-03 DIAGNOSIS — Z90.710 ACQUIRED ABSENCE OF BOTH CERVIX AND UTERUS: Chronic | ICD-10-CM

## 2022-10-03 PROCEDURE — 99396 PREV VISIT EST AGE 40-64: CPT

## 2022-10-03 NOTE — PHYSICAL EXAM
[Appropriately responsive] : appropriately responsive [Alert] : alert [No Acute Distress] : no acute distress [No Murmurs] : no murmurs [Soft] : soft [Non-tender] : non-tender [Non-distended] : non-distended [No HSM] : No HSM [No Lesions] : no lesions [No Mass] : no mass [Oriented x3] : oriented x3 [Examination Of The Breasts] : a normal appearance [No Masses] : no breast masses were palpable [Labia Majora] : normal [Labia Minora] : normal [Normal] : normal [Absent] : absent [Uterine Adnexae] : absent

## 2022-10-03 NOTE — HISTORY OF PRESENT ILLNESS
[Patient reported mammogram was normal] : Patient reported mammogram was normal [TextBox_4] : Here for annual. No Gyn complaints.  [Mammogramdate] : 2022 [TextBox_19] : recently had biopsy - negative. [TextBox_31] : No longer indicated

## 2022-10-12 NOTE — PATIENT PROFILE ADULT - ARRIVAL FROM
PULMONARY & CRITICAL CARE MEDICINE PROGRESS  NOTE     Patient:  Sofie Yen  MRN: [id number]  [facility] date: 10/7/2022  Primary Care Physician: Prentis Lanes, MD  Consulting Physician: Brian Bruce DO  CODE Status: Full Code  LOS: 5    SUBJECTIVE     I personally interviewed/examined the patient, reviewed interval history and interpreted all available radiographic, laboratory data at the time of service. Chief Compliant/Reason for Initial Consult:     Acute on chronic hypoxic hypercapnic respiratory failure    Brief Hospital Course: The patient is a 73 y.o. male history of hypertension, hyperlipidemia, COPD, chronic hypoxic respiratory failure on long-term oxygen therapy between 3 to 4 L at home and history of CHF. Brought into emergency room apparently with sudden onset of unresponsiveness. In the emergency room patient was apparently hypoxic in respiratory distress blood gas shows hypercapnia. Patient was intubated for respiratory failure and was admitted to MICU. CT head showed encephalomalacia in the right occipital lobe without acute intracranial abnormality. He had a CTA chest done which was negative for pulm embolism showed secretion and distal trachea and mainstem bronchi and possible bronchiolitis. Patient was hypotensive also. He was treated with bronchodilators steroids and apparently antibiotic also had required Levophed for hypotension and also had bradycardia. EKG was concerning for junctional rhythm and cardiology was following the patient did not require intervention. Patient was ultimately extubated on 10/10/2021 was on nasal cannula 4 L post extubation and ultimately was transferred to stepdown unit on 10/11/2022    Interval History:  10/12/22  Events noted, ICU note seen imaging studies seen and labs reviewed. Patient has been on 45 L nasal cannula since she was transferred to maintain saturation 90%. According to nursing staff he does desaturate if on activity. He is afebrile T-max of 98.4 he is hemodynamically stable and heart rate is between 60s and 70s and respiratory rate is usually in low 20s. Patient does have shortness of breath on minimal activity positive cough does have a sputum production which is yellowish in color does not complain of fever and chest pain denies leg edema. According to patient he has history of smoking for 40 years. Quit smoking 1 year ago he is on home oxygen at 3 L and recently increased to 4 L. Review of Systems:  Review of Systems   Constitutional:  Positive for activity change. Negative for fever and unexpected weight change. HENT:  Negative for postnasal drip, sore throat, trouble swallowing and voice change. Eyes:  Negative for photophobia, redness and visual disturbance. Respiratory:  Positive for cough, shortness of breath and wheezing. Cardiovascular:  Negative for chest pain, palpitations and leg swelling. Gastrointestinal:  Negative for abdominal pain, diarrhea and vomiting. Endocrine: Negative for polydipsia, polyphagia and polyuria. Genitourinary:  Negative for dysuria, frequency and hematuria. Musculoskeletal:  Negative for arthralgias and gait problem. Skin: Negative. Allergic/Immunologic: Negative. Neurological:  Negative for dizziness, seizures, speech difficulty and headaches. Hematological:  Negative for adenopathy. Does not bruise/bleed easily. Psychiatric/Behavioral: Negative.        OBJECTIVE     VITAL SIGNS:   LAST-  BP (!) 132/106   Pulse 72   Temp 98 °F (36.7 °C) (Temporal)   Resp 18   Ht 5' 5\" (1.651 m)   Wt 150 lb 5.7 oz (68.2 kg)   SpO2 95%   BMI 25.02 kg/m²   8-24 HR RANGE-  TEMP Temp  Av.2 °F (36.8 °C)  Min: 97.5 °F (36.4 °C)  Max: 98.8 °F (31.3 °C)   BP Systolic (23ZZC), TLA:415 , Min:132 , TWP:863      Diastolic (00GDB), BYB:50, Min:49, Max:106     PULSE Pulse  Av.7  Min: 53  Max: 72   RR Resp Av.3  Min: 14  Max: 20   O2 SAT SpO2  Av.3 %  Min: 94 %  Max: 97 %   OXYGEN DELIVERY O2 Flow Rate (L/min)  Av L/min  Min: 5 L/min  Max: 5 L/min     Systemic Examination:   Physical Exam  General appearance - looks comfortable and in no acute distress mildly tachypneic and chronically ill looking  Mental status - alert, oriented to person, place, and time  Eyes - pupils equal and reactive, extraocular eye movements intact  Mouth - mucous membranes moist, pharynx normal without lesions  Neck - supple, no significant adenopathy  Chest - Chest was symmetrical without dullness to percussion. There is increased resonance on percussion. He has bilateral breath sounds present but very reduced and diminished. No crackles present and no wheezing present currently.  There is no intercostal recession or use of accessory muscles  Heart - normal rate, regular rhythm, normal S1, S2, no murmurs, rubs, clicks or gallops  Abdomen - soft, nontender, nondistended, no masses or organomegaly  Neurological - alert, oriented, normal speech, no focal findings or movement disorder noted  Extremities - peripheral pulses normal, no pedal edema, no clubbing or cyanosis  Skin - normal coloration and turgor, no rashes, no suspicious skin lesions noted     DATA REVIEW     Medications:  Scheduled Meds:   amLODIPine  10 mg Oral Daily    carvedilol  12.5 mg Oral BID     lisinopril  20 mg Oral Daily    metFORMIN  500 mg Oral BID     predniSONE  40 mg Oral Daily    Followed by    Niki Farrar ON 10/15/2022] predniSONE  30 mg Oral Daily    Followed by    Niki Farrar ON 10/18/2022] predniSONE  20 mg Oral Daily    Followed by    Niki Farrar ON 10/21/2022] predniSONE  10 mg Oral Daily    aspirin  81 mg Oral Daily    atorvastatin  20 mg Oral Daily    pantoprazole  40 mg Oral QAM AC    insulin glargine  5 Units SubCUTAneous Nightly    sodium chloride flush  5-40 mL IntraVENous 2 times per day    enoxaparin  40 mg SubCUTAneous Daily 1059  Last data filed at 10/12/2022 0507  Gross per 24 hour   Intake 200 ml   Output 1600 ml   Net -1400 ml       Microbiology:  No results for input(s): SPECDESC, SPECDESC, SPECIAL, CULTURE, CULTURE, STATUS, ORG, CDIFFTOXPCR, CAMPYLOBPCR, SALMONELLAPC, SHIGAPCR, SHIGELLAPCR, MPNEUG, MPNEUM, LACTOQL in the last 72 hours. Pathology:    Radiology reports:  CT HEAD WO CONTRAST   Final Result   Mild cortical cerebral atrophy. Stable encephalomalacia in the right occipital lobe. No acute intracranial abnormalities are noted. CT CHEST PULMONARY EMBOLISM W CONTRAST   Final Result   1. No evidence of pulmonary embolism. 2.  Debris present within the distal trachea and mainstem bronchi with   findings of bronchiolitis bilaterally. This may be due to an endobronchial   infection or aspiration pneumonitis. XR CHEST PORTABLE   Final Result   Endotracheal tube terminates in appropriate position above the [place]. The   enteric tube terminates at the body of the stomach. Echocardiogram:   No results found for this or any previous visit. Cardiac Catheterization:   No results found for this or any previous visit. ASSESSMENT AND PLAN     Assessment:    //Acute exacerbation of COPD.  //Acute bronchitis/L RTI  //Acute on chronic hypoxic hypercapnic respiratory failure  //Severe COPD on long-term oxygen therapy  //Bradycardia/junctional rhythm.  //Smoking history of 40-pack-year or more.  //Diabetes mellitus  //Hypertension  //Hyperlipidemia    Plan:    Patient is currently on 5 L nasal cannula saturating above 92%. Wean O2 to keep saturation 88 to 90% discussed with nursing staff. He had used BiPAP last night 12/6/40 percent. Will continue nocturnal BiPAP and as needed during the daytime  Continue DuoNeb aerosol on increase the dose of Symbicort to 160/4.52 puff twice daily.   Patient remains hemodynamically stable and is currently saturating well on nasal cannula   He has yellow purulent looking sputum. We will get respiratory culture. Continue pulmonary toilet, aspiration precautions and bronchodilators  Continue to monitor I/O with a goal of even fluid balance  Antimicrobials reviewed; had Unasyn. We will add levofloxacin for 5 days  Finished 5 days of IV Solu-Medrol on prednisone taper dose  DVT prophylaxis on Lovenox 40 mg daily  Physical/occupational/speech therapy; increase activity as tolerated    Discussed with nursing staff, treatment plan discussed    I updated the patient regarding the current clinical condition, provisional diagnosis and management plan. I addressed concerns and answered all questions to the best of my abilities. It was my pleasure to evaluate Addie Salvador today. We will continue to follow. I would like to thank you for allowing me to participate in the care of this patient. Please feel free to call with any further questions or concerns. Cindy Eng MD, M.D. Pulmonary and Critical Care Medicine           10/12/2022, 9:04 AM    Please note that this chart was generated using voice recognition Dragon dictation software. Although every effort was made to ensure the accuracy of this automated transcription, some errors in transcription may have occurred. Home

## 2022-10-30 ENCOUNTER — OUTPATIENT (OUTPATIENT)
Dept: OUTPATIENT SERVICES | Facility: HOSPITAL | Age: 62
LOS: 1 days | Discharge: HOME | End: 2022-10-30

## 2022-10-30 DIAGNOSIS — Z90.49 ACQUIRED ABSENCE OF OTHER SPECIFIED PARTS OF DIGESTIVE TRACT: Chronic | ICD-10-CM

## 2022-10-30 DIAGNOSIS — Z96.652 PRESENCE OF LEFT ARTIFICIAL KNEE JOINT: Chronic | ICD-10-CM

## 2022-10-30 DIAGNOSIS — Z98.890 OTHER SPECIFIED POSTPROCEDURAL STATES: Chronic | ICD-10-CM

## 2022-10-30 DIAGNOSIS — Z90.710 ACQUIRED ABSENCE OF BOTH CERVIX AND UTERUS: Chronic | ICD-10-CM

## 2022-10-30 DIAGNOSIS — R22.9 LOCALIZED SWELLING, MASS AND LUMP, UNSPECIFIED: ICD-10-CM

## 2022-10-30 PROCEDURE — 93970 EXTREMITY STUDY: CPT | Mod: 26

## 2022-11-15 NOTE — ED PROVIDER NOTE - ATTENDING WITH...
[All substances negative except as specified below] : All substances negative except as specified below [_____] : Frequency: [unfilled] [HS Diploma or GED] : HS Diploma or GED [FreeTextEntry2] : Supports: children (eldest son), some friends\par \par strengths: resilient, caring and supporting others, giving\par \par stressors: work [No] : Have you ever experienced this type of event? No [Competitive and integrated employment] : Competitive and integrated employment [35 hours or more] : 35 hours or more [Private residence (home, apartment, rooming house, hotel, motel, supported housing, supported Single Room Occupancy (SRO),] : Private residence (home, apartment, rooming house, hotel, motel, supported housing, supported Single Room Occupancy (SRO), permanent housing programs, transient housing programs, and shelter plus care housing) [Other relatives not specified above] : other relatives not specified above Resident

## 2022-11-16 ENCOUNTER — RESULT REVIEW (OUTPATIENT)
Age: 62
End: 2022-11-16

## 2022-11-16 ENCOUNTER — OUTPATIENT (OUTPATIENT)
Dept: OUTPATIENT SERVICES | Facility: HOSPITAL | Age: 62
LOS: 1 days | Discharge: HOME | End: 2022-11-16
Payer: COMMERCIAL

## 2022-11-16 DIAGNOSIS — Z90.49 ACQUIRED ABSENCE OF OTHER SPECIFIED PARTS OF DIGESTIVE TRACT: Chronic | ICD-10-CM

## 2022-11-16 DIAGNOSIS — R92.8 OTHER ABNORMAL AND INCONCLUSIVE FINDINGS ON DIAGNOSTIC IMAGING OF BREAST: ICD-10-CM

## 2022-11-16 DIAGNOSIS — Z98.890 OTHER SPECIFIED POSTPROCEDURAL STATES: Chronic | ICD-10-CM

## 2022-11-16 DIAGNOSIS — Z96.652 PRESENCE OF LEFT ARTIFICIAL KNEE JOINT: Chronic | ICD-10-CM

## 2022-11-16 DIAGNOSIS — Z90.710 ACQUIRED ABSENCE OF BOTH CERVIX AND UTERUS: Chronic | ICD-10-CM

## 2022-11-16 PROCEDURE — G0279: CPT | Mod: 26

## 2022-11-16 PROCEDURE — 76642 ULTRASOUND BREAST LIMITED: CPT | Mod: 26,RT

## 2022-11-16 PROCEDURE — 77066 DX MAMMO INCL CAD BI: CPT | Mod: 26

## 2022-11-17 ENCOUNTER — NON-APPOINTMENT (OUTPATIENT)
Age: 62
End: 2022-11-17

## 2022-11-22 ENCOUNTER — APPOINTMENT (OUTPATIENT)
Dept: BREAST CENTER | Facility: CLINIC | Age: 62
End: 2022-11-22

## 2022-11-22 VITALS
SYSTOLIC BLOOD PRESSURE: 165 MMHG | HEIGHT: 64 IN | BODY MASS INDEX: 45.07 KG/M2 | DIASTOLIC BLOOD PRESSURE: 86 MMHG | WEIGHT: 264 LBS

## 2022-11-22 PROCEDURE — 99213 OFFICE O/P EST LOW 20 MIN: CPT

## 2022-11-22 NOTE — REASON FOR VISIT
[Follow-Up: _____] : a [unfilled] follow-up visit [FreeTextEntry1] : left breast DCIS; BIRADS-3 imaging review.

## 2022-11-22 NOTE — ASSESSMENT
[FreeTextEntry1] : CORY RECIO is a 62 year old female patient who presents today in follow up for left breast DCIS; BIRADS-3 imaging review.\par Since her last visit, she has no new breast related complaints.\par \par Most recent imaging:\par B/L Dx Mammo & Right Breast Sono - 11/16/2022:\par -There are scattered areas of fibroglandular density.\par -The patient is status post a left lumpectomy with multiple stable previously biopsied calcifications in the region of the scar. \par -Left-sided skin thickening is unchanged.\par -In the right breast, in the upper outer quadrant, there is an ovoid asymmetry which appears to be contiguous with the blood vessels likely reflecting a vascular dilatation. This is probably benign. \par Targeted right Breast Sonogram:\par -No discrete abnormality is identified in the lateral aspect of the right breast.\par BI-RADS category 3: Probably Benign\par \par We discussed BIRADS 3 lesions. These lesions have a 2% chance of harboring malignancy. There is always an option to obtain a tissue sample with a biopsy to confirm the diagnosis. The procedure was described in detail including the placement of a tissue marker clip. The risks of the procedure, including but not limited to bleeding and infection were also explained. She is not interested in biopsy at this time and agrees to continue with short-term interval imaging, which is traditionally performed at six-month intervals for approximately two years to establish stability.\par \par Imaging with a right unilateral diagnostic mammogram will be ordered for May 2023, \par and that will be scheduled today. \par She will return for follow-up and clinical breast exam following imaging. \par \par I spent a total of 20 minutes of face to face time with this patient, greater than 50% of which was spent in counseling and/or coordination of care.\par All of her questions were appropriately answered.\par She knows to call with any concerns.\par \par PLAN:\par -Right Uni Dx Mammo - May 2023.\par -f/u after.

## 2022-11-22 NOTE — HISTORY OF PRESENT ILLNESS
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  \par Estrogen receptor positive, 95\par Progesterone receptor positive, 75\par \par Left breast DCIS, micropapillary and solid types with comedo necrosis and calcifications, intermediate nuclear grade on stereotactic core biopsy 12/24/19; LUOQ, Anterior calcifications, Calcifications span 5 CM (tophat). \par Estrogen receptor positive, 95\par Progesterone receptor positive, 75\par \par No prior complaints related to the breasts. \par Her family history is significant for her maternal aunt with breast cancer in her 70's; her maternal first cousin with breast cancer in her 50's; her paternal first cousin with breast cancer in her 50's. 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  No genetic testing in the patient or her family.  \par \par s/p Left NLOC x 2 - 01/27/2020 = Two (2) healing prior bx sites with widespread DCIS), micropapillary and comedo types associated with calcifications, intermediate nuclear grade.\par -  AJCC 8th Edition Pathologic Stage: pTis(DCIS), pN0, pMx\par \par Dr. Solo Joseph - Anastrozole\par Dr. Meliza Oswald - (+) RT to the left breast (03/16-04/06/2020)\par \par CORY RECIO is a 61 year old female patient who presents today in follow up for left breast DCIS.\par Since her last visit, she has no new breast related complaints - she continues to have sporadic breast pain. \par \par Imaging with a bilateral diagnostic mammogram and sonogram was done on 11/15/2021, which revealed there are scattered areas of fibroglandular density. The patient is status post a left lumpectomy with stable architectural distortion most consistent with postsurgical change.  \par \par Targeted Bilateral Breast Sonogram: Again identified at the left lumpectomy site which is at the 1-2 o'clock location 8 cm from the nipple is an oval circumscribed hyperechoic mass which is without significant change measuring 1.0 cm x 1.0 cm x 0.7 cm consistent with fat necrosis.\par BI-RADS Category 2: Benign\par \par INTERVAL HISTORY 6/30/22\ariadna De La Garza is here for her six months follow up visit\par She has no breast related complaints at this time.  She denies any breast pain, has not palpated any new palpable masses in either breast and denies any nipple discharge or retraction.\par \par Her imaging is as follows:\par 05/10/2022 left dx mammo and US\par -scattered areas of fibroglandular density.\par -status post a left lumpectomy with multiple calcifications in the region of the scar. These have a similar appearance to the calcifications seen prior to surgery. These span approximately 4.1 cm AP-->stereotactic guided biopsy is recommended.\par BI-RADS4\par \par 05/16/2022 LEFT stereotactic bx (mini-cork)\par -Benign atrophic fatty breast tissue with remote postsurgical site changes associated with stromal calcifications.\par \par \par 11/22/2022 --\par CORY RECIO is a 62 year old female patient who presents today in follow up for left breast DCIS; BIRADS-3 imaging review.\par Since her last visit, she has no new breast related complaints.\par \par Most recent imaging:\par B/L Dx Mammo & Right Breast Sono - 11/16/2022:\par -There are scattered areas of fibroglandular density.\par -The patient is status post a left lumpectomy with multiple stable previously biopsied calcifications in the region of the scar. \par -Left-sided skin thickening is unchanged.\par -In the right breast, in the upper outer quadrant, there is an ovoid asymmetry which appears to be contiguous with the blood vessels likely reflecting a vascular dilatation. This is probably benign. \par Targeted right Breast Sonogram:\par -No discrete abnormality is identified in the lateral aspect of the right breast.\par BI-RADS category 3: Probably Benign\par \par She presents today for evaluation and imaging review.\par \par

## 2022-11-22 NOTE — PHYSICAL EXAM
[Normocephalic] : normocephalic [Atraumatic] : atraumatic [No Supraclavicular Adenopathy] : no supraclavicular adenopathy [Examined in the supine and seated position] : examined in the supine and seated position [No dominant masses] : no dominant masses in right breast  [No dominant masses] : no dominant masses left breast [No Nipple Discharge] : no left nipple discharge [No Edema] : no edema [No Rashes] : no rashes [No Ulceration] : no ulceration [Breast Nipple Inversion] : nipples not inverted [Breast Nipple Retraction] : nipples not retracted [de-identified] : well healed surgical scar.\par palpable scar tissue.\par (+) RT changes.  [de-identified] : No axillary lymphadenopathy appreciated. [de-identified] : No axillary lymphadenopathy appreciated.

## 2022-11-22 NOTE — DATA REVIEWED
[FreeTextEntry1] : B/L Dx Mammo & Right Breast Sono - 11/16/2022:\par Breast composition: There are scattered areas of fibroglandular density.\par \par The patient is status post a left lumpectomy with multiple stable previously biopsied calcifications in the region of the scar. Left-sided skin thickening is unchanged.\par \par In the right breast, in the upper outer quadrant, there is an ovoid asymmetry which appears to be contiguous with the blood vessels likely reflecting a vascular dilatation. This is probably benign. No suspicious masses or calcifications are identified in either breast.\par \par Targeted right Breast Sonogram:\par \par No discrete abnormality is identified in the lateral aspect of the right breast.\par \par Impression:\par \par Stable postoperative changes of the left breast are benign\par \par Probably benign nodular asymmetry in the right breast. Follow-up mammogram in 6 months is recommended.\par \par Recommendation: Follow-up right mammogram in 6 months.\par \par BI-RADS category 3: Probably Benign

## 2022-12-22 ENCOUNTER — APPOINTMENT (OUTPATIENT)
Dept: HEMATOLOGY ONCOLOGY | Facility: CLINIC | Age: 62
End: 2022-12-22

## 2022-12-22 ENCOUNTER — OUTPATIENT (OUTPATIENT)
Dept: OUTPATIENT SERVICES | Facility: HOSPITAL | Age: 62
LOS: 1 days | End: 2022-12-22

## 2022-12-22 VITALS
DIASTOLIC BLOOD PRESSURE: 77 MMHG | SYSTOLIC BLOOD PRESSURE: 151 MMHG | HEART RATE: 73 BPM | HEIGHT: 64 IN | TEMPERATURE: 98 F | BODY MASS INDEX: 45.07 KG/M2 | RESPIRATION RATE: 16 BRPM | WEIGHT: 264 LBS

## 2022-12-22 DIAGNOSIS — Z98.890 OTHER SPECIFIED POSTPROCEDURAL STATES: Chronic | ICD-10-CM

## 2022-12-22 DIAGNOSIS — Z90.49 ACQUIRED ABSENCE OF OTHER SPECIFIED PARTS OF DIGESTIVE TRACT: Chronic | ICD-10-CM

## 2022-12-22 DIAGNOSIS — Z90.710 ACQUIRED ABSENCE OF BOTH CERVIX AND UTERUS: Chronic | ICD-10-CM

## 2022-12-22 DIAGNOSIS — Z96.652 PRESENCE OF LEFT ARTIFICIAL KNEE JOINT: Chronic | ICD-10-CM

## 2022-12-22 DIAGNOSIS — R76.0 RAISED ANTIBODY TITER: ICD-10-CM

## 2022-12-22 PROCEDURE — 99214 OFFICE O/P EST MOD 30 MIN: CPT

## 2022-12-28 PROBLEM — R76.0 ANTICARDIOLIPIN ANTIBODY POSITIVE: Status: ACTIVE | Noted: 2022-12-28

## 2022-12-28 LAB
CARDIOLIPIN AB SER IA-ACNC: POSITIVE
PROT C PPP CHRO-ACNC: 120 %

## 2022-12-28 NOTE — ASSESSMENT
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 Anticardiolipin IgM mildly elevated 20.4. Will repeat in 3 months.\par \par #  Left breast DCIS, intermediate nuclear grade, ER/[place] positive, s/p lumpectomy with negative margins. \par -- Continue Anastrozole daily.\par -- Breast exam did not reveal palpable abnormality. Reviewed bilateral mammogram from 11/2022 which showed Stable postoperative changes of the left breast are benign. Probably benign nodular asymmetry in the right breast. Follow-up mammogram in 6 months is recommended.She will have dx mammogram and US of right breast in 5/2023. \par -- She is given a referral for repeat bone density. Continue calcium and vitamin D supplement and physical activity.  \par -- Followup with orthopedic for knee problems.\par -- Followup with breast surgeon and Dr. Oswald as indicated.\par -- RTO for followup in 6 months.\par \par Case was seen and discussed with Dr. Joseph who agreed with the assessment and plan.\par \par \par

## 2022-12-28 NOTE — PHYSICAL EXAM
[Fully active, able to carry on all pre-disease performance without restriction] : Status 0 - Fully active, able to carry on all pre-disease performance without restriction [Normal] : affect appropriate [de-identified] : Status post left breast lumpectomy. Surgical scar is healing well. There is no palpable abnormality. [de-identified] : no tenderness to palpation BLE, superficial ulcer to right medial ankle visualized s/p Mohl's surgery

## 2022-12-28 NOTE — HISTORY OF PRESENT ILLNESS
[de-identified] : 58 yo female is referred by Dr. Stevens for consultation of adjuvant endocrine therapy. The patient had a screening mammogram on 12/12/19 which showed mass and calcifications in the left breast requiring additional evaluation. On 12/20/19, left breast dx mammo and US showed segmental calcifications in the UOQ of the left breast spanning over about 5 cm. The previously noted apparent mass persisted on spot compression views and corresponded with a benign cyst seen on concurrent US.\par \par On 12/24/19, stereotactic core biopsy of LUOQ, Posterior and anterior calcifications, Calcifications span 5 CM both revealed DCIS solid and cribriform types with comedo necrosis and calcifications, intermediate nuclear grade. \par Estrogen receptor positive, 95%\par Progesterone receptor positive, 75%\par \par Her family history is significant for her maternal aunt with breast cancer in her 70's; her maternal first cousin with breast cancer in her 50's; her paternal first cousin with breast cancer in her 50's. 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 No genetic testing in the patient or her family. \par \par On 1/27/2020, she underwent Left NLOC. The pathology revealed 2 healing prior biopsy sites with widespread DCIS, micropapillary and comedo types associated with calcifications, intermediated nuclear grade and present in 21/35 slides. The inferiore margin was involved and lateral margin was 1.0 mm. Reexcision of left inferior and lateral margins were negative. \par \par The patient recovered well from surgery. She is here today to discuss adjuvant endocrine therapy.\par \par She had RATLH/BSO for persistent PMB in 11/2019. The pathology revealed benign endometrial polyps, corpus uteri showing intramural leiomyomas and ovaries without significant pathologic change.\par  [de-identified] : 7/27/2020:\par The patient is here for followup visit. She was diagnosed with  left breast breast DCIS, intermediate nuclear grade, ER/LA positive, s/p lumpectomy with negative margins. She received adjuvant WBI with 5240cGy to the left breast from 3/16/2020 through 4/6/2020 without any complications. She has been taking Anastrozole daily since 4/2020 and tolerated well. She had bone density in 2/2020 which was within normal limit. She does not have breast related complains. \par \par 1/28/2021:\par The patient is here for followup visit. She was diagnosed with  left breast breast DCIS, intermediate nuclear grade, ER/LA positive, s/p lumpectomy on 1/27/2020 with negative margins. She received adjuvant WBI with 5240cGy to the left breast from 3/16/2020 through 4/6/2020 without any complications. She has been taking Anastrozole daily since 4/2020 and tolerated well. She had bone density in 2/2020 which was within normal limit. She does not have breast related complains. On 12/14/2020, she had b/l dx mammo and US which showed post left lumpectomy and radiation therapy with expected posttreatment changes in. No abnormal clusters of microcalcifications. Recommendation: Follow-up unilateral diagnostic mammogram and left ultrasound in 6 months.  Bone Density from 2/19/20 was normal.\par \par 8/23/21:\par The patient is here for followup visit. She was diagnosed with  left breast breast DCIS, intermediate nuclear grade, ER/LA positive, s/p lumpectomy on 1/27/2020 with negative margins. She received adjuvant WBI with 5240cGy to the left breast from 3/16/2020 through 4/6/2020. She has been taking Anastrozole daily since 4/2020 and tolerated well. She had bone density in 2/2020 which was within normal limit. On 6/14/21, she had left breast dx mammo and US which showed stable architectural distortion most consistent with postsurgical change/fat necrosis. Bone Density from 2/19/20 was normal. She complains feeling itching under her both breast. \par \par 2/3/22:\par The patient is here for followup visit. She has left breast breast DCIS, intermediate nuclear grade, ER/LA positive, s/p lumpectomy on 1/27/2020 with negative margins. She received adjuvant WBI with 5240cGy to the left breast from 3/16/2020 through 4/6/2020. She has been taking Anastrozole daily since 4/2020 and tolerated well.  On 11/15/21, she had b/l breast dx mammo and US which showed stable architectural distortion most consistent with postsurgical change/fat necrosis. Bone Density from 2/19/20 was normal. She has knee pain and has been followed with her orthopedic doctor. \par \par 6/9/22\par The patient is here today for a new diagnosis for DVT right peroneal vein 5/4/22.  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  She developed DVT after surgery. She is presently on Eliquis 5mg PO bid. She also has left breast breast DCIS, intermediate nuclear grade, ER/LA positive, s/p lumpectomy on 1/27/2020 with negative margins. She received adjuvant WBI with 5240cGy to the left breast from 3/16/2020 through 4/6/2020. She has been taking Anastrozole daily since 4/2020 and tolerated well.  On 11/15/21, she had b/l breast dx mammo and US which showed stable architectural distortion most consistent with postsurgical change/fat necrosis. Bone Density from 2/19/20 was normal. Rx was given last visit - not done yet 2/2022. She has knee pain and has been followed with her orthopedic doctor. \par \par 12/22/22\par The patient is here today for follow up. She has h/o DVT right peroneal vein 5/4/22.  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  She developed DVT after surgery. She was on Eliquis 5mg PO bid x 3 months Dr Malcolm discontinued Eliquis in August. She had hypercoagulability workup in June which showed anticardiolipin ATB positive and anticardiolipin IgM 20.4. She also has left breast breast DCIS, intermediate nuclear grade, ER/LA positive, s/p lumpectomy on 1/27/2020 with negative margins. She received adjuvant WBI with 5240cGy to the left breast from 3/16/2020 through 4/6/2020. She has been taking Anastrozole daily since 4/2020 and has moderate arthralgia, however patient wants to continue anastrazole.  On 11/16/22, she had b/l breast dx mammo and US which showed Stable postoperative changes of the left breast are benign. Probably benign nodular asymmetry in the right breast. Follow-up mammogram in 6 months is recommended. Bone Density from 2/19/20 was normal. Rx was given last visit - not done yet 2/2022. \par

## 2023-01-03 DIAGNOSIS — D05.10 INTRADUCTAL CARCINOMA IN SITU OF UNSPECIFIED BREAST: ICD-10-CM

## 2023-01-03 DIAGNOSIS — R76.0 RAISED ANTIBODY TITER: ICD-10-CM

## 2023-01-03 DIAGNOSIS — Z86.718 PERSONAL HISTORY OF OTHER VENOUS THROMBOSIS AND EMBOLISM: ICD-10-CM

## 2023-01-03 DIAGNOSIS — Z51.81 ENCOUNTER FOR THERAPEUTIC DRUG LEVEL MONITORING: ICD-10-CM

## 2023-01-12 ENCOUNTER — APPOINTMENT (OUTPATIENT)
Dept: VASCULAR SURGERY | Facility: CLINIC | Age: 63
End: 2023-01-12
Payer: COMMERCIAL

## 2023-01-12 VITALS — BODY MASS INDEX: 45.07 KG/M2 | HEIGHT: 64 IN | WEIGHT: 264 LBS

## 2023-01-12 PROCEDURE — 99213 OFFICE O/P EST LOW 20 MIN: CPT

## 2023-01-12 PROCEDURE — 93970 EXTREMITY STUDY: CPT

## 2023-01-12 NOTE — DATA REVIEWED
[FreeTextEntry1] : I performed a venous duplex which was medically necessary to evaluate for DVT. No DVT noted in the right lower extremity, limited visualization of calf veins due to edema.\par

## 2023-01-12 NOTE — HISTORY OF PRESENT ILLNESS
[FreeTextEntry1] : 61 y/o female who was diagnosed with right peroneal vein DVT on 5/4/22, was on Eliquis for 3 months and has been off anticoagulation since August 2022. She has limited ROM of right knee and hip due to arthritis and was undergoing physical therapy. No prior h/o DVT.\par \par She presents today for right leg pain.

## 2023-01-12 NOTE — ASSESSMENT
[FreeTextEntry1] : 63 y/o female with right calf DVT, was on Eliquis for 3 months, off anticoagulation for 5 months now.\par \par Duplex today showed no evidence of acute DVT in the right lower extremity, calf veins note seen due to edema. She was reassured.\par \par I recommend follow up in 6 months for repeat venous duplex.

## 2023-01-13 ENCOUNTER — NON-APPOINTMENT (OUTPATIENT)
Age: 63
End: 2023-01-13

## 2023-02-22 ENCOUNTER — INPATIENT (INPATIENT)
Facility: HOSPITAL | Age: 63
LOS: 0 days | Discharge: HOME | DRG: 364 | End: 2023-02-22
Attending: SURGERY | Admitting: SURGERY
Payer: COMMERCIAL

## 2023-02-22 ENCOUNTER — TRANSCRIPTION ENCOUNTER (OUTPATIENT)
Age: 63
End: 2023-02-22

## 2023-02-22 VITALS
SYSTOLIC BLOOD PRESSURE: 151 MMHG | WEIGHT: 268.96 LBS | RESPIRATION RATE: 20 BRPM | OXYGEN SATURATION: 100 % | DIASTOLIC BLOOD PRESSURE: 98 MMHG | HEART RATE: 102 BPM

## 2023-02-22 VITALS
RESPIRATION RATE: 15 BRPM | DIASTOLIC BLOOD PRESSURE: 67 MMHG | HEART RATE: 89 BPM | SYSTOLIC BLOOD PRESSURE: 140 MMHG | OXYGEN SATURATION: 97 %

## 2023-02-22 DIAGNOSIS — S81.819A LACERATION WITHOUT FOREIGN BODY, UNSPECIFIED LOWER LEG, INITIAL ENCOUNTER: ICD-10-CM

## 2023-02-22 DIAGNOSIS — Z96.652 PRESENCE OF LEFT ARTIFICIAL KNEE JOINT: Chronic | ICD-10-CM

## 2023-02-22 DIAGNOSIS — Z90.710 ACQUIRED ABSENCE OF BOTH CERVIX AND UTERUS: Chronic | ICD-10-CM

## 2023-02-22 DIAGNOSIS — Z90.49 ACQUIRED ABSENCE OF OTHER SPECIFIED PARTS OF DIGESTIVE TRACT: Chronic | ICD-10-CM
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
## 2023-02-22 DIAGNOSIS — Z98.890 OTHER SPECIFIED POSTPROCEDURAL STATES: Chronic | ICD-10-CM

## 2023-02-22 LAB
ALBUMIN SERPL ELPH-MCNC: 4.5 G/DL — SIGNIFICANT CHANGE UP (ref 3.5–5.2)
ALP SERPL-CCNC: 83 U/L — SIGNIFICANT CHANGE UP (ref 30–115)
ALT FLD-CCNC: 19 U/L — SIGNIFICANT CHANGE UP (ref 0–41)
ANION GAP SERPL CALC-SCNC: 13 MMOL/L — SIGNIFICANT CHANGE UP (ref 7–14)
APTT BLD: 34 SEC — SIGNIFICANT CHANGE UP (ref 27–39.2)
AST SERPL-CCNC: 24 U/L — SIGNIFICANT CHANGE UP (ref 0–41)
BASOPHILS # BLD AUTO: 0.05 K/UL — SIGNIFICANT CHANGE UP (ref 0–0.2)
BASOPHILS NFR BLD AUTO: 0.7 % — SIGNIFICANT CHANGE UP (ref 0–1)
BILIRUB SERPL-MCNC: 0.6 MG/DL — SIGNIFICANT CHANGE UP (ref 0.2–1.2)
BUN SERPL-MCNC: 22 MG/DL — HIGH (ref 10–20)
CALCIUM SERPL-MCNC: 8.9 MG/DL — SIGNIFICANT CHANGE UP (ref 8.4–10.5)
CHLORIDE SERPL-SCNC: 102 MMOL/L — SIGNIFICANT CHANGE UP (ref 98–110)
CO2 SERPL-SCNC: 24 MMOL/L — SIGNIFICANT CHANGE UP (ref 17–32)
CREAT SERPL-MCNC: 0.8 MG/DL — SIGNIFICANT CHANGE UP (ref 0.7–1.5)
EGFR: 84 ML/MIN/1.73M2 — SIGNIFICANT CHANGE UP
EOSINOPHIL # BLD AUTO: 0.15 K/UL — SIGNIFICANT CHANGE UP (ref 0–0.7)
EOSINOPHIL NFR BLD AUTO: 2.2 % — SIGNIFICANT CHANGE UP (ref 0–8)
ETHANOL SERPL-MCNC: <10 MG/DL — SIGNIFICANT CHANGE UP
GLUCOSE SERPL-MCNC: 141 MG/DL — HIGH (ref 70–99)
HCT VFR BLD CALC: 39.7 % — SIGNIFICANT CHANGE UP (ref 37–47)
HGB BLD-MCNC: 12.4 G/DL — SIGNIFICANT CHANGE UP (ref 12–16)
IMM GRANULOCYTES NFR BLD AUTO: 0.3 % — SIGNIFICANT CHANGE UP (ref 0.1–0.3)
INR BLD: 1.03 RATIO — SIGNIFICANT CHANGE UP (ref 0.65–1.3)
LACTATE SERPL-SCNC: 2.6 MMOL/L — HIGH (ref 0.7–2)
LIDOCAIN IGE QN: 37 U/L — SIGNIFICANT CHANGE UP (ref 7–60)
LYMPHOCYTES # BLD AUTO: 1.73 K/UL — SIGNIFICANT CHANGE UP (ref 1.2–3.4)
LYMPHOCYTES # BLD AUTO: 25.9 % — SIGNIFICANT CHANGE UP (ref 20.5–51.1)
MCHC RBC-ENTMCNC: 20.4 PG — LOW (ref 27–31)
MCHC RBC-ENTMCNC: 31.2 G/DL — LOW (ref 32–37)
MCV RBC AUTO: 65.4 FL — LOW (ref 81–99)
MONOCYTES # BLD AUTO: 0.46 K/UL — SIGNIFICANT CHANGE UP (ref 0.1–0.6)
MONOCYTES NFR BLD AUTO: 6.9 % — SIGNIFICANT CHANGE UP (ref 1.7–9.3)
NEUTROPHILS # BLD AUTO: 4.27 K/UL — SIGNIFICANT CHANGE UP (ref 1.4–6.5)
NEUTROPHILS NFR BLD AUTO: 64 % — SIGNIFICANT CHANGE UP (ref 42.2–75.2)
NRBC # BLD: 0 /100 WBCS — SIGNIFICANT CHANGE UP (ref 0–0)
PLATELET # BLD AUTO: 316 K/UL — SIGNIFICANT CHANGE UP (ref 130–400)
POTASSIUM SERPL-MCNC: 4 MMOL/L — SIGNIFICANT CHANGE UP (ref 3.5–5)
POTASSIUM SERPL-SCNC: 4 MMOL/L — SIGNIFICANT CHANGE UP (ref 3.5–5)
PROT SERPL-MCNC: 7.4 G/DL — SIGNIFICANT CHANGE UP (ref 6–8)
PROTHROM AB SERPL-ACNC: 11.7 SEC — SIGNIFICANT CHANGE UP (ref 9.95–12.87)
RBC # BLD: 6.07 M/UL — HIGH (ref 4.2–5.4)
RBC # FLD: 17.5 % — HIGH (ref 11.5–14.5)
SARS-COV-2 RNA SPEC QL NAA+PROBE: SIGNIFICANT CHANGE UP
SODIUM SERPL-SCNC: 139 MMOL/L — SIGNIFICANT CHANGE UP (ref 135–146)
WBC # BLD: 6.68 K/UL — SIGNIFICANT CHANGE UP (ref 4.8–10.8)
WBC # FLD AUTO: 6.68 K/UL — SIGNIFICANT CHANGE UP (ref 4.8–10.8)

## 2023-02-22 PROCEDURE — 73630 X-RAY EXAM OF FOOT: CPT | Mod: LT

## 2023-02-22 PROCEDURE — 73610 X-RAY EXAM OF ANKLE: CPT | Mod: 26,LT

## 2023-02-22 PROCEDURE — 90715 TDAP VACCINE 7 YRS/> IM: CPT

## 2023-02-22 PROCEDURE — 71045 X-RAY EXAM CHEST 1 VIEW: CPT | Mod: 26

## 2023-02-22 PROCEDURE — 99285 EMERGENCY DEPT VISIT HI MDM: CPT | Mod: 25

## 2023-02-22 PROCEDURE — 93308 TTE F-UP OR LMTD: CPT | Mod: 26

## 2023-02-22 PROCEDURE — 73590 X-RAY EXAM OF LOWER LEG: CPT | Mod: 26,LT

## 2023-02-22 PROCEDURE — 73562 X-RAY EXAM OF KNEE 3: CPT | Mod: LT

## 2023-02-22 PROCEDURE — 73630 X-RAY EXAM OF FOOT: CPT | Mod: 26,LT

## 2023-02-22 PROCEDURE — 73610 X-RAY EXAM OF ANKLE: CPT | Mod: LT

## 2023-02-22 PROCEDURE — 86900 BLOOD TYPING SEROLOGIC ABO: CPT

## 2023-02-22 PROCEDURE — 93010 ELECTROCARDIOGRAM REPORT: CPT

## 2023-02-22 PROCEDURE — 72170 X-RAY EXAM OF PELVIS: CPT | Mod: 26

## 2023-02-22 PROCEDURE — 99222 1ST HOSP IP/OBS MODERATE 55: CPT

## 2023-02-22 PROCEDURE — 76705 ECHO EXAM OF ABDOMEN: CPT | Mod: 26

## 2023-02-22 PROCEDURE — 86901 BLOOD TYPING SEROLOGIC RH(D): CPT

## 2023-02-22 PROCEDURE — 86850 RBC ANTIBODY SCREEN: CPT

## 2023-02-22 PROCEDURE — 13120 CMPLX RPR S/A/L 1.1-2.5 CM: CPT | Mod: 1L

## 2023-02-22 PROCEDURE — 36415 COLL VENOUS BLD VENIPUNCTURE: CPT

## 2023-02-22 PROCEDURE — 73562 X-RAY EXAM OF KNEE 3: CPT | Mod: 26,LT

## 2023-02-22 RX ORDER — OXYCODONE HYDROCHLORIDE 5 MG/1
1 TABLET ORAL
Qty: 12 | Refills: 0
Start: 2023-02-22 | End: 2023-02-24

## 2023-02-22 RX ORDER — MEPERIDINE HYDROCHLORIDE 50 MG/ML
12.5 INJECTION INTRAMUSCULAR; INTRAVENOUS; SUBCUTANEOUS
Refills: 0 | Status: DISCONTINUED | OUTPATIENT
Start: 2023-02-22 | End: 2023-02-22

## 2023-02-22 RX ORDER — POTASSIUM CHLORIDE 20 MEQ
10 PACKET (EA) ORAL DAILY
Refills: 0 | Status: DISCONTINUED | OUTPATIENT
Start: 2023-02-22 | End: 2023-02-22

## 2023-02-22 RX ORDER — SODIUM CHLORIDE 9 MG/ML
1000 INJECTION, SOLUTION INTRAVENOUS
Refills: 0 | Status: DISCONTINUED | OUTPATIENT
Start: 2023-02-22 | End: 2023-02-22

## 2023-02-22 RX ORDER — LEVOTHYROXINE SODIUM 125 MCG
1 TABLET ORAL
Qty: 0 | Refills: 0 | DISCHARGE

## 2023-02-22 RX ORDER — METHYLPREDNISOLONE 4 MG
500 TABLET ORAL DAILY
Refills: 0 | Status: DISCONTINUED | OUTPATIENT
Start: 2023-02-22 | End: 2023-02-22

## 2023-02-22 RX ORDER — ONDANSETRON 8 MG/1
1 TABLET, FILM COATED ORAL
Qty: 5 | Refills: 0
Start: 2023-02-22

## 2023-02-22 RX ORDER — ACETAMINOPHEN 500 MG
975 TABLET ORAL ONCE
Refills: 0 | Status: COMPLETED | OUTPATIENT
Start: 2023-02-22 | End: 2023-02-22

## 2023-02-22 RX ORDER — METOCLOPRAMIDE HCL 10 MG
10 TABLET ORAL ONCE
Refills: 0 | Status: DISCONTINUED | OUTPATIENT
Start: 2023-02-22 | End: 2023-02-22

## 2023-02-22 RX ORDER — METOPROLOL TARTRATE 50 MG
25 TABLET ORAL DAILY
Refills: 0 | Status: DISCONTINUED | OUTPATIENT
Start: 2023-02-22 | End: 2023-02-22

## 2023-02-22 RX ORDER — TETANUS TOXOID, REDUCED DIPHTHERIA TOXOID AND ACELLULAR PERTUSSIS VACCINE, ADSORBED 5; 2.5; 8; 8; 2.5 [IU]/.5ML; [IU]/.5ML; UG/.5ML; UG/.5ML; UG/.5ML
0.5 SUSPENSION INTRAMUSCULAR ONCE
Refills: 0 | Status: COMPLETED | OUTPATIENT
Start: 2023-02-22 | End: 2023-02-22

## 2023-02-22 RX ORDER — IBUPROFEN 200 MG
400 TABLET ORAL EVERY 8 HOURS
Refills: 0 | Status: DISCONTINUED | OUTPATIENT
Start: 2023-02-22 | End: 2023-02-22

## 2023-02-22 RX ORDER — ANASTROZOLE 1 MG/1
1 TABLET ORAL DAILY
Refills: 0 | Status: DISCONTINUED | OUTPATIENT
Start: 2023-02-22 | End: 2023-02-22

## 2023-02-22 RX ORDER — HYDROMORPHONE HYDROCHLORIDE 2 MG/ML
0.5 INJECTION INTRAMUSCULAR; INTRAVENOUS; SUBCUTANEOUS
Refills: 0 | Status: DISCONTINUED | OUTPATIENT
Start: 2023-02-22 | End: 2023-02-22

## 2023-02-22 RX ORDER — LEVOTHYROXINE SODIUM 125 MCG
125 TABLET ORAL DAILY
Refills: 0 | Status: DISCONTINUED | OUTPATIENT
Start: 2023-02-22 | End: 2023-02-22

## 2023-02-22 RX ORDER — IBUPROFEN 200 MG
600 TABLET ORAL ONCE
Refills: 0 | Status: COMPLETED | OUTPATIENT
Start: 2023-02-22 | End: 2023-02-22

## 2023-02-22 RX ORDER — KETOROLAC TROMETHAMINE 30 MG/ML
15 SYRINGE (ML) INJECTION ONCE
Refills: 0 | Status: DISCONTINUED | OUTPATIENT
Start: 2023-02-22 | End: 2023-02-22

## 2023-02-22 RX ORDER — NIFEDIPINE 30 MG
30 TABLET, EXTENDED RELEASE 24 HR ORAL DAILY
Refills: 0 | Status: DISCONTINUED | OUTPATIENT
Start: 2023-02-22 | End: 2023-02-22

## 2023-02-22 RX ORDER — ENOXAPARIN SODIUM 100 MG/ML
40 INJECTION SUBCUTANEOUS EVERY 24 HOURS
Refills: 0 | Status: DISCONTINUED | OUTPATIENT
Start: 2023-02-22 | End: 2023-02-22

## 2023-02-22 RX ORDER — HYDROMORPHONE HYDROCHLORIDE 2 MG/ML
1 INJECTION INTRAMUSCULAR; INTRAVENOUS; SUBCUTANEOUS
Refills: 0 | Status: DISCONTINUED | OUTPATIENT
Start: 2023-02-22 | End: 2023-02-22

## 2023-02-22 RX ORDER — ONDANSETRON 8 MG/1
4 TABLET, FILM COATED ORAL ONCE
Refills: 0 | Status: COMPLETED | OUTPATIENT
Start: 2023-02-22 | End: 2023-02-22

## 2023-02-22 RX ORDER — FOLIC ACID 0.8 MG
1 TABLET ORAL DAILY
Refills: 0 | Status: DISCONTINUED | OUTPATIENT
Start: 2023-02-22 | End: 2023-02-22

## 2023-02-22 RX ORDER — LEVOTHYROXINE SODIUM 125 MCG
1 TABLET ORAL
Qty: 0 | Refills: 0 | DISCHARGE
Start: 2023-02-22

## 2023-02-22 RX ORDER — SODIUM CHLORIDE 9 MG/ML
250 INJECTION INTRAMUSCULAR; INTRAVENOUS; SUBCUTANEOUS ONCE
Refills: 0 | Status: COMPLETED | OUTPATIENT
Start: 2023-02-22 | End: 2023-02-22

## 2023-02-22 RX ORDER — CEFAZOLIN SODIUM 1 G
2000 VIAL (EA) INJECTION ONCE
Refills: 0 | Status: COMPLETED | OUTPATIENT
Start: 2023-02-22 | End: 2023-02-22

## 2023-02-22 RX ORDER — ACETAMINOPHEN 500 MG
650 TABLET ORAL EVERY 6 HOURS
Refills: 0 | Status: DISCONTINUED | OUTPATIENT
Start: 2023-02-22 | End: 2023-02-22

## 2023-02-22 RX ADMIN — Medication 600 MILLIGRAM(S): at 12:50

## 2023-02-22 RX ADMIN — SODIUM CHLORIDE 250 MILLILITER(S): 9 INJECTION INTRAMUSCULAR; INTRAVENOUS; SUBCUTANEOUS at 11:55

## 2023-02-22 RX ADMIN — Medication 100 MILLIGRAM(S): at 11:55

## 2023-02-22 RX ADMIN — ONDANSETRON 4 MILLIGRAM(S): 8 TABLET, FILM COATED ORAL at 20:08

## 2023-02-22 RX ADMIN — Medication 15 MILLIGRAM(S): at 17:00

## 2023-02-22 RX ADMIN — Medication 975 MILLIGRAM(S): at 12:49

## 2023-02-22 RX ADMIN — TETANUS TOXOID, REDUCED DIPHTHERIA TOXOID AND ACELLULAR PERTUSSIS VACCINE, ADSORBED 0.5 MILLILITER(S): 5; 2.5; 8; 8; 2.5 SUSPENSION INTRAMUSCULAR at 12:50

## 2023-02-22 NOTE — CHART NOTE - NSCHARTNOTEFT_GEN_A_CORE
PACU ANESTHESIA ADMISSION NOTE      Procedure:   Post op diagnosis:      ____  Intubated  TV:______       Rate: ______      FiO2: ______    __x__  Patent Airway    __x__  Full return of protective reflexes    ___x_  Full recovery from anesthesia / back to baseline status    Vitals:  temp(F) 98  /47  spo2 98  RR 17  pulse 93    Mental Status:  _x ___ Awake   _____ Alert   _____ Drowsy   _____ Sedated    Nausea/Vomiting:  ____x  NO  ______Yes,   See Post - Op Orders          Pain Scale (0-10):  _____    Treatment: ____ None    x ____ See Post - Op/PCA Orders    Post - Operative Fluids:   ____ Oral   __x __ See Post - Op Orders    Plan: Discharge:   _x ___Home       _____Floor     _____Critical Care    _____  Other:_________________    Comments: uneventful anesthesia course no complications. Vitals stable. Pt transferred to PACU

## 2023-02-22 NOTE — DISCHARGE NOTE PROVIDER - NSDCFUSCHEDAPPT_GEN_ALL_CORE_FT
[facility] PreAdmits  Scheduled Appointment: 04/13/2023    Solo Joseph  [facility] Physician [facility]  [facility] [address]  Scheduled Appointment: 04/13/2023    Unknown, Doctor  [facility] PreAdmits  Scheduled Appointment: 05/17/2023    [facility] Physician [facility]  [address]  Scheduled Appointment: 05/17/2023

## 2023-02-22 NOTE — H&P ADULT - ATTENDING COMMENTS
patient seen. has a laceration to the leg. neurovascular intact. compartments soft. to OR for repair. seen by orthopedics and plastics services as well.
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
## 2023-02-22 NOTE — DISCHARGE NOTE PROVIDER - CARE PROVIDER_API CALL
Monty Daley)  Surgery; Surgical Critical Care  [address] [place] [id number]  Phone: [phone]  Fax: [phone]  Follow Up Time:     Manuel Pratt)  Plastic Surgery; Surgery of the Hand  [address] [place] [address], [address]  [place] [address]  Phone: [phone]  Fax: [phone]  Follow Up Time:
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
## 2023-02-22 NOTE — BRIEF OPERATIVE NOTE - NSICDXBRIEFPROCEDURE_GEN_ALL_CORE_FT
PROCEDURES:  Exploration, wound, lower extremity 22-Feb-2023 15:19:29 washout and closure of LLE deep tissue laceration Lashell Walton

## 2023-02-22 NOTE — DISCHARGE NOTE PROVIDER - NSDCFUADDINST_GEN_ALL_CORE_FT
You are being discharged from [facility]. Please contact the phone numbers provided for Dr. Daley and Dr. Pratt's offices and schedule follow-up appointments. Please keep your dressings in place, do not remove prior to follow-up. Please record your drain outputs and the character of the drains twice per day. If the output increases, please call Dr. Daley's office or return to the emergency department. Take over the counter Tylenol and Ibuprofen for pain control. You have been prescribed oxycodone for breakthrough pain relief, please only take as needed and take as directed. Do not drive while taking narcotic pain medications. Please do not perform strenuous activities, especially with your left leg. Keep left leg elevated when seated or lying down. If you have any further questions about your care, please do not hesitate to contact Dr. Daley's office.
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
## 2023-02-22 NOTE — H&P ADULT - NSHPLABSRESULTS_GEN_ALL_CORE
`Labs:  CAPILLARY BLOOD GLUCOSE                      LFTs:         Coags:                        RADIOLOGY & ADDITIONAL STUDIES:  ---------------------------------------------------------------------------------------

## 2023-02-22 NOTE — ED PROVIDER NOTE - PHYSICAL EXAMINATION
CONST: Pt appears uncomfortable  EYES: Sclera and conjunctiva clear.   ENT: No nasal discharge. Oropharynx normal appearing  NECK: Non-tender, no meningeal signs. normal ROM. supple   CARD: S1 S2; No jvd  RESP: Equal BS B/L, No wheezes, rhonchi or rales. No distress  GI: Soft, non-tender, non-distended. no cva tenderness. normal BS  MS: Normal ROM in all extremities. pulses 2 +.   SKIN: Laceration to anterior LLE  NEURO: A&Ox3, No focal deficits. Strength 5/5 with no sensory deficits. CONST: Pt appears uncomfortable  EYES: Sclera and conjunctiva clear.   ENT: No nasal discharge. Oropharynx normal appearing  NECK: Non-tender, no meningeal signs. normal ROM. supple   CARD: S1 S2; No jvd  RESP: Equal BS B/L, No wheezes, rhonchi or rales. No distress  GI: Soft, non-tender, non-distended. no cva tenderness. normal BS  MS: Normal ROM in all extremities. pulses 2 +.   SKIN: Laceration to anterior LLE, no open fracture identified.  NEURO: A&Ox3, No focal deficits. Strength 5/5 with no sensory deficits.
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
## 2023-02-22 NOTE — DISCHARGE NOTE NURSING/CASE MANAGEMENT/SOCIAL WORK - NSDCPEFALRISK_GEN_ALL_CORE
For information on Fall & Injury Prevention, visit: [email or web address] OR  [email or web address] OR  https://www.cdc.gov/steadi/patient.html
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
## 2023-02-22 NOTE — ED PROCEDURE NOTE - POC ULTRA FAST US DIAGNOSIS
SUBJECTIVE:  Fiorella Simpson, a 32 year old female scheduled an appointment to discuss the following issues:     Chance-Danlos syndrome  Bloody diarrhea  Constipation, unspecified constipation type  Multiple joint pain  Salt craving  Pt here with mom-lives in [place]-was diagnosed with EDS through genetic and diagnostic criteria testing at [facility] in 2016.  She has not been followed by any provider for this issue since(of note she did have echocardiogram).  She works from home and has managed well with EDS through exercise but is having multiple other symptoms now-not sure if related or not.  She has pain in multiple joints, especially wrists and fingers, also stiffness.  Her finger will turn colors in the cold air. Her main issue is 6 months of episodic abdominal cramping which can be severe followed by bloody diarrhea which seems to relieve cramping. NO ongoijg dizziness that is new.  She is quite constipated-has a BM about every 5-7 days but never sees formed stools anymore-other than when she was prescribed some opioids for a pain issue..  Pt does also relate salt craving-will often just lick salt off her finger after pressing in salt.    PT has appt with [facility] to establish care and get into the system on May 11 but wanted to see if anything should be done sooner.    She denies and problems eating or getting in fluids, no dizziness.    Medical, social, surgical, and family histories reviewed.    Patient Active Problem List   Diagnosis     Dysmenorrhea     Other acne     Obsessive-compulsive disorder     Allergic rhinitis     ACP (advance care planning)     Health Care Home     Ankle pain     Chance-Danlos syndrome     No past medical history on file.  Family History   Problem Relation Age of Onset     Other - See Comments Mother      gall bladder removed     Other - See Comments Sister      Chance DE LA TORREAROSALIE Maternal Grandmother      DIABETES Maternal Grandmother      Breast Cancer No family hx of       Cancer - colorectal No family hx of      Social History     Social History     Marital status: Single     Spouse name: N/A     Number of children: 0     Years of education: N/A     Occupational History      Other     [employer] -      Social History Main Topics     Smoking status: Never Smoker     Smokeless tobacco: Never Used     Alcohol use 0.0 oz/week     0 drink(s) per week      Comment: rare     Drug use: No     Sexual activity: Yes     Partners: Male     Birth control/ protection: Condom, OCP     Other Topics Concern     Exercise Yes     Bike Helmet Yes     Seat Belt Yes     Social History Narrative     Past Surgical History:   Procedure Laterality Date     C APPENDECTOMY  1999     DENTAL SURGERY  4/2006    [facility]     FOOT SURGERY Right 2009, 2012    fracture repair     FOOT SURGERY Left 2017     GALLBLADDER SURGERY  3/16/11    Dr Okeefe     HERNIORRHAPHY INGUINAL  5/23/2012    Procedure:HERNIORRHAPHY INGUINAL; Left Inguinal Hernia Repair with Mesh         Current Outpatient Prescriptions on File Prior to Visit:  drospirenone-ethinyl estradiol (JOSEPH) 3-0.03 MG per tablet Take 1 tablet by mouth daily     No current facility-administered medications on file prior to visit.      Allergies: Morphine    Immunization History   Administered Date(s) Administered     HPV 04/25/2007, 06/27/2008, 07/31/2009     Influenza Vaccine IM 3yrs+ 4 Valent IIV4 10/24/2017     TD (ADULT, 7+) 12/28/2006     TDAP Vaccine (Boostrix) 05/21/2012     Varicella Pt Report Hx of Varicella/Chicken Pox 01/01/1992      ROS:  CONSTITUTIONAL: NEGATIVE for fever, chills  EYES: NEGATIVE for vision changes   RESP: NEGATIVE for significant cough or SOB  CV: NEGATIVE for chest pain, palpitations   GI: NEGATIVE for nausea,   : NEGATIVE for frequency, dysuria, or hematuria  NEURO: NEGATIVE for weakness, dizziness or paresthesias or headache  ENDOCRINE: NEGATIVE for temperature intolerance, skin/hair changes  PSYCHIATRIC: NEGATIVE for  changes in mood or affect    OBJECTIVE:  /72 (BP Location: Left arm, Patient Position: Sitting, Cuff Size: Adult Regular)  Pulse 103  Temp 98.8  F (37.1  C) (Oral)  Wt 70 kg (154 lb 6.4 oz)  LMP 04/19/2018  SpO2 98%  BMI 25.3 kg/m2  EXAM:  GENERAL APPEARANCE: healthy, alert and no distress  EYES: EOMI,  PERRL  HENT: ear canals and TM's normal and nose and mouth without ulcers or lesions  NECK: no adenopathy, no asymmetry, masses, or scars and thyroid normal to palpation  RESP: lungs clear to auscultation - no rales, rhonchi or wheezes  CV: regular rates and rhythm, normal S1 S2, no S3 or S4 and no murmur, click or rub -  ABDOMEN:  soft, nontender, no HSM or masses and bowel sounds normal  MS: pt with some notable laxity of wrists, feels stiff to some degree as well  SKIN: no suspicious lesions or rashes  NEURO: Normal strength and tone, sensory exam grossly normal, mentation intact and speech normal  PSYCH: mentation appears normal and affect normal/bright    ASSESSMENT/PLAN:  (Q79.6) Chance-Danlos syndrome  (primary encounter diagnosis)  Comment: pt previously diagnosed, not currently seeing any specialist or therapist-pain OK which is reassuring-suggest she get tied in with [facility] for this issue as she is local and already scheduled  Plan: f/u with [facility]    (R19.7) Bloody diarrhea  Comment: suspicious for IBD-not signs overt dehydration or anemia although salt craving a bit odd  Plan: needs GI eval but will defer to [facility]    (K59.00) Constipation, unspecified constipation type  Comment: fairly severe by hx but has been long term issue  Plan: I recommend she wait on any new therapies until GI eval for IBD    (M25.50) Multiple joint pain  Comment: likely EDS related  Plan: recommend PT at [facility]    (R63.8) Salt craving  Comment: ? If adrenal issue, anemia  Plan: offered labs but she prefers to wait and have all done at [facility] after our discussion    (I73.00) Raynaud's phenomenon without gangrene  Comment:  pt shows pictures of clear Raynauds-discussed diagnosis  Plan: keep hands warm as able    *25 minute visit, >50% in discussion/counseling *      Negative FAST

## 2023-02-22 NOTE — DISCHARGE NOTE PROVIDER - NSDCMRMEDTOKEN_GEN_ALL_CORE_FT
anastrozole 1 mg oral tablet: 1 tab(s) orally once a day  Aspir 81 oral delayed release tablet: 1 tab(s) orally once a day  folic acid 1 mg oral tablet: 1 tab(s) orally once a day  hydroCHLOROthiazide 25 mg oral tablet: 1 tab(s) orally once a day  Klor-Con 10 mEq oral tablet, extended release: 1 tab(s) orally once a day  levothyroxine 125 mcg (0.125 mg) oral tablet: 1 tab(s) orally once a day  magnesium gluconate 500 mg oral tablet: 1 tab(s) orally once a day  metoprolol succinate 25 mg oral tablet, extended release: 1 tab(s) orally once a day  oxyCODONE 5 mg oral tablet: 1 tab(s) orally every 6 hours -for severe pain MDD:4 tablets   Procardia XL 30 mg oral tablet, extended release: 1 tab(s) orally once a day  Vitamin D3 2000 intl units oral tablet: 1 tab(s) orally once a day   anastrozole 1 mg oral tablet: 1 tab(s) orally once a day  Aspir 81 oral delayed release tablet: 1 tab(s) orally once a day  folic acid 1 mg oral tablet: 1 tab(s) orally once a day  hydroCHLOROthiazide 25 mg oral tablet: 1 tab(s) orally once a day  Klor-Con 10 mEq oral tablet, extended release: 1 tab(s) orally once a day  levothyroxine 125 mcg (0.125 mg) oral tablet: 1 tab(s) orally once a day  magnesium gluconate 500 mg oral tablet: 1 tab(s) orally once a day  metoprolol succinate 25 mg oral tablet, extended release: 1 tab(s) orally once a day  ondansetron 4 mg oral tablet: 1 tab(s) orally every 6 hours MDD:Please take 1 tab by mouth every 6 hours as needed for moderate to severe nausea.  oxyCODONE 5 mg oral tablet: 1 tab(s) orally every 6 hours -for severe pain MDD:4 tablets   Procardia XL 30 mg oral tablet, extended release: 1 tab(s) orally once a day  Vitamin D3 2000 intl units oral tablet: 1 tab(s) orally once a day
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
## 2023-02-22 NOTE — BRIEF OPERATIVE NOTE - OPERATION/FINDINGS
~30cm laceration of the left lower extremity, extending to the bone on the lateral aspect. Fascia in tact. Dermis and skin reapproximated, #15 harry drain left in the subcutaneous space.

## 2023-02-22 NOTE — DISCHARGE NOTE NURSING/CASE MANAGEMENT/SOCIAL WORK - PATIENT PORTAL LINK FT
You can access the FollowMyHealth Patient Portal offered by [facility] by registering at the following website: [email or web address]. By joining [employer]’s FollowMyHealth portal, you will also be able to view your health information using other applications (apps) compatible with our system.

## 2023-02-22 NOTE — DISCHARGE NOTE PROVIDER - NSDCCPTREATMENT_GEN_ALL_CORE_FT
PRINCIPAL PROCEDURE  Procedure: Exploration, wound, lower extremity  Findings and Treatment: washout and closure of LLE deep tissue laceration

## 2023-02-22 NOTE — DISCHARGE NOTE NURSING/CASE MANAGEMENT/SOCIAL WORK - NSDCVIVACCINE_GEN_ALL_CORE_FT
Tdap; 22-Feb-2023 12:50; Hunter Acuna); Temptster; [id number] (Exp. Date: 12-Apr-2025); IntraMuscular; Deltoid Left.; 0.5 milliLiter(s); VIS (VIS Published: 09-May-2013, VIS Presented: 22-Feb-2023);
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
## 2023-02-22 NOTE — ED ADULT NURSE NOTE - NSIMPLEMENTINTERV_GEN_ALL_ED
Implemented All Fall with Harm Risk Interventions:  [place] to call system. Call bell, personal items and telephone within reach. Instruct patient to call for assistance. Room bathroom lighting operational. Non-slip footwear when patient is off stretcher. Physically safe environment: no spills, clutter or unnecessary equipment. Stretcher in lowest position, wheels locked, appropriate side rails in place. Provide visual cue, wrist band, yellow gown, etc. Monitor gait and stability. Monitor for mental status changes and reorient to person, place, and time. Review medications for side effects contributing to fall risk. Reinforce activity limits and safety measures with patient and family. Provide visual clues: red socks.

## 2023-02-22 NOTE — H&P ADULT - NSHPPHYSICALEXAM_GEN_ALL_CORE
General: NAD  HEENT: Normocephalic, atraumatic, EOMI, PEERLA. no scalp lacerations   Neck: Soft, midline trachea. no c-spine tenderness  Chest: No chest wall tenderness, no subcutaneous emphysema   Cardiac: S1, S2, RRR  Respiratory: Bilateral breath sounds, clear and equal bilaterally  Abdomen: Soft, non-distended, non-tender, no rebound, no guarding.  Groin: Normal appearing, pelvis stable   Ext:  Moving b/l upper and lower extremities. Palpable Radial b/l UE, b/l DP palpable in LE. Soft tissue laceration from left medial anterior aspect of leg to lateral posterior aspect spanning about 35 cm, no active bleeding, depth down to bone. Palpable DP/PT in LLE. Sensation intact and ROM intact.   Back: No T/L/S spine tenderness, No palpable runoff/stepoff/deformity  Rectal: No emil blood, MIRELLA with good tone
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
## 2023-02-24 ENCOUNTER — APPOINTMENT (OUTPATIENT)
Dept: SURGERY | Facility: CLINIC | Age: 63
End: 2023-02-24
Payer: COMMERCIAL

## 2023-02-24 ENCOUNTER — EMERGENCY (EMERGENCY)
Facility: HOSPITAL | Age: 63
LOS: 0 days | Discharge: HOME | End: 2023-02-24
Attending: STUDENT IN AN ORGANIZED HEALTH CARE EDUCATION/TRAINING PROGRAM
Payer: COMMERCIAL

## 2023-02-24 VITALS
OXYGEN SATURATION: 100 % | RESPIRATION RATE: 18 BRPM | SYSTOLIC BLOOD PRESSURE: 179 MMHG | DIASTOLIC BLOOD PRESSURE: 79 MMHG | TEMPERATURE: 98 F | HEART RATE: 78 BPM

## 2023-02-24 VITALS
DIASTOLIC BLOOD PRESSURE: 60 MMHG | TEMPERATURE: 97.1 F | HEIGHT: 64 IN | HEART RATE: 82 BPM | SYSTOLIC BLOOD PRESSURE: 108 MMHG | OXYGEN SATURATION: 97 %

## 2023-02-24 DIAGNOSIS — Z90.710 ACQUIRED ABSENCE OF BOTH CERVIX AND UTERUS: ICD-10-CM

## 2023-02-24 DIAGNOSIS — Z90.49 ACQUIRED ABSENCE OF OTHER SPECIFIED PARTS OF DIGESTIVE TRACT: Chronic | ICD-10-CM

## 2023-02-24 DIAGNOSIS — Z96.652 PRESENCE OF LEFT ARTIFICIAL KNEE JOINT: Chronic | ICD-10-CM

## 2023-02-24 DIAGNOSIS — Z98.890 OTHER SPECIFIED POSTPROCEDURAL STATES: Chronic | ICD-10-CM

## 2023-02-24 DIAGNOSIS — Z90.722 ACQUIRED ABSENCE OF OVARIES, BILATERAL: ICD-10-CM

## 2023-02-24 DIAGNOSIS — G43.909 MIGRAINE, UNSPECIFIED, NOT INTRACTABLE, WITHOUT STATUS MIGRAINOSUS: ICD-10-CM

## 2023-02-24 DIAGNOSIS — Z91.040 LATEX ALLERGY STATUS: ICD-10-CM

## 2023-02-24 DIAGNOSIS — I10 ESSENTIAL (PRIMARY) HYPERTENSION: ICD-10-CM

## 2023-02-24 DIAGNOSIS — Z96.652 PRESENCE OF LEFT ARTIFICIAL KNEE JOINT: ICD-10-CM

## 2023-02-24 DIAGNOSIS — Z90.49 ACQUIRED ABSENCE OF OTHER SPECIFIED PARTS OF DIGESTIVE TRACT: ICD-10-CM

## 2023-02-24 DIAGNOSIS — E89.0 POSTPROCEDURAL HYPOTHYROIDISM: ICD-10-CM

## 2023-02-24 DIAGNOSIS — L76.21 POSTPROCEDURAL HEMORRHAGE OF SKIN AND SUBCUTANEOUS TISSUE FOLLOWING A DERMATOLOGIC PROCEDURE: ICD-10-CM

## 2023-02-24 DIAGNOSIS — Z87.19 PERSONAL HISTORY OF OTHER DISEASES OF THE DIGESTIVE SYSTEM: ICD-10-CM

## 2023-02-24 DIAGNOSIS — Z90.710 ACQUIRED ABSENCE OF BOTH CERVIX AND UTERUS: Chronic | ICD-10-CM

## 2023-02-24 DIAGNOSIS — Z88.8 ALLERGY STATUS TO OTHER DRUGS, MEDICAMENTS AND BIOLOGICAL SUBSTANCES STATUS: ICD-10-CM

## 2023-02-24 DIAGNOSIS — Z79.82 LONG TERM (CURRENT) USE OF ASPIRIN: ICD-10-CM

## 2023-02-24 LAB
ANION GAP SERPL CALC-SCNC: 7 MMOL/L — SIGNIFICANT CHANGE UP (ref 7–14)
BUN SERPL-MCNC: 20 MG/DL — SIGNIFICANT CHANGE UP (ref 10–20)
CALCIUM SERPL-MCNC: 8.4 MG/DL — SIGNIFICANT CHANGE UP (ref 8.4–10.4)
CHLORIDE SERPL-SCNC: 103 MMOL/L — SIGNIFICANT CHANGE UP (ref 98–110)
CO2 SERPL-SCNC: 31 MMOL/L — SIGNIFICANT CHANGE UP (ref 17–32)
CREAT SERPL-MCNC: 0.7 MG/DL — SIGNIFICANT CHANGE UP (ref 0.7–1.5)
EGFR: 99 ML/MIN/1.73M2 — SIGNIFICANT CHANGE UP
GLUCOSE SERPL-MCNC: 143 MG/DL — HIGH (ref 70–99)
HCT VFR BLD CALC: 26.1 % — LOW (ref 37–47)
HGB BLD-MCNC: 8.3 G/DL — LOW (ref 12–16)
MCHC RBC-ENTMCNC: 20.9 PG — LOW (ref 27–31)
MCHC RBC-ENTMCNC: 31.8 G/DL — LOW (ref 32–37)
MCV RBC AUTO: 65.6 FL — LOW (ref 81–99)
NRBC # BLD: 0 /100 WBCS — SIGNIFICANT CHANGE UP (ref 0–0)
PLATELET # BLD AUTO: 256 K/UL — SIGNIFICANT CHANGE UP (ref 130–400)
POTASSIUM SERPL-MCNC: 3.3 MMOL/L — LOW (ref 3.5–5)
POTASSIUM SERPL-SCNC: 3.3 MMOL/L — LOW (ref 3.5–5)
RBC # BLD: 3.98 M/UL — LOW (ref 4.2–5.4)
RBC # FLD: 16.1 % — HIGH (ref 11.5–14.5)
SODIUM SERPL-SCNC: 141 MMOL/L — SIGNIFICANT CHANGE UP (ref 135–146)
WBC # BLD: 8.22 K/UL — SIGNIFICANT CHANGE UP (ref 4.8–10.8)
WBC # FLD AUTO: 8.22 K/UL — SIGNIFICANT CHANGE UP (ref 4.8–10.8)

## 2023-02-24 PROCEDURE — 85027 COMPLETE CBC AUTOMATED: CPT

## 2023-02-24 PROCEDURE — 99282 EMERGENCY DEPT VISIT SF MDM: CPT

## 2023-02-24 PROCEDURE — 80048 BASIC METABOLIC PNL TOTAL CA: CPT

## 2023-02-24 PROCEDURE — 99284 EMERGENCY DEPT VISIT MOD MDM: CPT

## 2023-02-24 PROCEDURE — 99024 POSTOP FOLLOW-UP VISIT: CPT

## 2023-02-24 PROCEDURE — 36415 COLL VENOUS BLD VENIPUNCTURE: CPT

## 2023-02-24 NOTE — ED PROVIDER NOTE - PHYSICAL EXAMINATION
CONSTITUTIONAL: Well-developed; well-nourished; in no acute distress.   EXT: Normal ROM.  No clubbing, cyanosis or edema.  + left lower extremity laceration with stitches in place, FANI drain in place With serosanguineous fluid noted in the area.  LYMPH: No acute cervical adenopathy.  NEURO: Alert, oriented, grossly unremarkable  PSYCH: Cooperative, appropriate.

## 2023-02-24 NOTE — CONSULT NOTE ADULT - SUBJECTIVE AND OBJECTIVE BOX
GENERAL SURGERY CONSULT NOTE    Patient: CORY RECIO , 62y ([birth year])Female   MRN: [id number]  Location: [facility] ED  Visit: 02-24-23 Emergency  Date: 02-24-23 @ 20:12    HPI:  62yF w/ PMH of laceration to LLE s/p repair 2/22 presented to the ED for drainage from her wound. She visited an outpatient clinic today for dressing change. Upon returning home, she noted increased drainage and bandages that were heavily stained with blood. She denied any new trauma tot he area. No other complaints.    PAST MEDICAL & SURGICAL HISTORY:  HTN (hypertension)  Hypothyroidism  GERD (gastroesophageal reflux disease)  Fibromyalgia  Irritable bowel disease  Migraines  Lung nodule  H/O melanoma excision  Leg edema BLE  S/P thyroidectomy  S/P laparoscopic cholecystectomy  History of D&amp;C  History of left knee replacement  S/P total hysterectomy and bilateral salpingo-oophorectomy      Home Medications:  anastrozole 1 mg oral tablet: 1 tab(s) orally once a day (22 Feb 2023 12:42)  Aspir 81 oral delayed release tablet: 1 tab(s) orally once a day (27 Jan 2020 07:17)  folic acid 1 mg oral tablet: 1 tab(s) orally once a day (27 Jan 2020 07:17)  hydroCHLOROthiazide 25 mg oral tablet: 1 tab(s) orally once a day (27 Jan 2020 07:17)  Klor-Con 10 mEq oral tablet, extended release: 1 tab(s) orally once a day (27 Jan 2020 07:17)  levothyroxine 125 mcg (0.125 mg) oral tablet: 1 tab(s) orally once a day (22 Feb 2023 15:33)  magnesium gluconate 500 mg oral tablet: 1 tab(s) orally once a day (27 Jan 2020 07:17)  metoprolol succinate 25 mg oral tablet, extended release: 1 tab(s) orally once a day (27 Jan 2020 07:17)  Procardia XL 30 mg oral tablet, extended release: 1 tab(s) orally once a day (27 Jan 2020 07:17)  Vitamin D3 2000 intl units oral tablet: 1 tab(s) orally once a day (27 Jan 2020 07:17)        VITALS:  T(F): 98 (02-24-23 @ 17:34), Max: 98 (02-24-23 @ 17:34)  HR: 78 (02-24-23 @ 17:34) (78 - 78)  BP: 179/79 (02-24-23 @ 17:34) (179/79 - 179/79)  RR: 18 (02-24-23 @ 17:34) (18 - 18)  SpO2: 100% (02-24-23 @ 17:34) (100% - 100%)    PHYSICAL EXAM:  General: NAD, AAOx3, calm and cooperative  HEENT: NCAT, Trachea ML  Cardiac: RRR S1, S2,  Respiratory: Normal respiratory effort on RA  Abdomen: Soft, non-tender  Musculoskeletal: Soft, mobile. LLE FANI drain w/ serosanguinous output (60cc). Ecchymosis on anterior and lateral aspects. Sutures in place.  Neuro: Sensation grossly intact and equal throughout, no focal deficits  Vascular: Extremities well perfused  Skin: Warm/dry, normal color, no jaundice    MEDICATIONS  (STANDING):    MEDICATIONS  (PRN):      LAB/STUDIES:                        8.3    8.22  )-----------( 256      ( 24 Feb 2023 19:23 )             26.1     02-24    141  |  103  |  20  ----------------------------<  143<H>  3.3<L>   |  31  |  0.7    Ca    8.4      24 Feb 2023 19:23    IMAGING:  Nothing in past 24 hours.    ASSESSMENT:  62yF w/ PMH of laceration to LLE s/p repair 2/22 presented to the ED for drainage from her wound. Physical exam findings, imaging, and labs as documented above.     PLAN:  - Dressing changed. Xeroform, gauze, kerlix, ACE wrap   - Leave dressing in place until next follow up  - Follow up outpatient as regularly scheduled with plastics  - Plan discussed with senior on call and attending

## 2023-02-24 NOTE — ED PROVIDER NOTE - PATIENT PORTAL LINK FT
You can access the FollowMyHealth Patient Portal offered by [facility] by registering at the following website: [email or web address]. By joining "[employer]’s FollowMyHealth portal, you will also be able to view your health information using other applications (apps) compatible with our system.

## 2023-02-24 NOTE — ED ADULT TRIAGE NOTE - CHIEF COMPLAINT QUOTE
Patient walk in a+ox3 reports had surgery on left lower leg Wednesday 2/22 and today leaking blood at surgical site.

## 2023-02-24 NOTE — CONSULT NOTE ADULT - ATTENDING COMMENTS
patient seen, had some ooze from lex site. leg wound already re-dressed, patient did not want me to take dressing down. follow up with dr lemus.

## 2023-02-24 NOTE — ED PROVIDER NOTE - NSFOLLOWUPINSTRUCTIONS_ED_ALL_ED_FT
Please follow up with plastic surgery and general surgery.  Please return if you develop more bleeding or worsening of symptoms.

## 2023-02-24 NOTE — ED PROVIDER NOTE - CONSIDERATION OF ADMISSION OBSERVATION
Patient does not require admission at this time given improvement in symptoms and clearance from surgery team Consideration of Admission/Observation

## 2023-02-24 NOTE — ED PROVIDER NOTE - CLINICAL SUMMARY MEDICAL DECISION MAKING FREE TEXT BOX
Differential diagnosis includes postoperative bleeding, complication  Consults: Surgery 62-year-old female presenting today after drainage from her wound.  Patient is hemodynamically stable upon evaluation.  Patient noted to have hemoglobin of 8.3 which is decreased from her baseline from 2 days ago.  Surgery was consulted for evaluation and after evaluation they reported that patient is safe for discharge for outpatient follow-up.  Patient had dressing replaced, no further oozing or bleeding noted and was discharged. Patient otherwise asymptomatic.  Return precautions explained to patient.
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
## 2023-02-24 NOTE — ED PROVIDER NOTE - OBJECTIVE STATEMENT
62-year-old female past medical history of laceration of lower extremity that was repaired 2 days ago presented today with drainage from her wound.  Patient reports that she was seen by her surgeon today and had bandages redressed and when she arrived at home she noted to have drainage and bandages were filled with blood.  Denies any trauma to the area.  Denies any other symptomatology.

## 2023-02-27 DIAGNOSIS — Z96.652 PRESENCE OF LEFT ARTIFICIAL KNEE JOINT: ICD-10-CM

## 2023-02-27 DIAGNOSIS — Z79.890 HORMONE REPLACEMENT THERAPY: ICD-10-CM

## 2023-02-27 DIAGNOSIS — Z79.82 LONG TERM (CURRENT) USE OF ASPIRIN: ICD-10-CM

## 2023-02-27 DIAGNOSIS — Z85.3 PERSONAL HISTORY OF MALIGNANT NEOPLASM OF BREAST: ICD-10-CM

## 2023-02-27 DIAGNOSIS — I10 ESSENTIAL (PRIMARY) HYPERTENSION: ICD-10-CM
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
## 2023-02-27 DIAGNOSIS — S81.812A LACERATION WITHOUT FOREIGN BODY, LEFT LOWER LEG, INITIAL ENCOUNTER: ICD-10-CM

## 2023-02-27 DIAGNOSIS — K21.9 GASTRO-ESOPHAGEAL REFLUX DISEASE WITHOUT ESOPHAGITIS: ICD-10-CM

## 2023-02-27 DIAGNOSIS — Z88.8 ALLERGY STATUS TO OTHER DRUGS, MEDICAMENTS AND BIOLOGICAL SUBSTANCES STATUS: ICD-10-CM

## 2023-02-27 DIAGNOSIS — Z90.49 ACQUIRED ABSENCE OF OTHER SPECIFIED PARTS OF DIGESTIVE TRACT: ICD-10-CM

## 2023-02-27 DIAGNOSIS — Z91.040 LATEX ALLERGY STATUS: ICD-10-CM

## 2023-02-27 DIAGNOSIS — Z20.822 CONTACT WITH AND (SUSPECTED) EXPOSURE TO COVID-19: ICD-10-CM
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
## 2023-02-27 LAB — GLUCOSE BLDC GLUCOMTR-MCNC: 149 MG/DL — HIGH (ref 70–99)

## 2023-02-28 ENCOUNTER — APPOINTMENT (OUTPATIENT)
Dept: PLASTIC SURGERY | Facility: CLINIC | Age: 63
End: 2023-02-28
Payer: COMMERCIAL

## 2023-02-28 PROCEDURE — 99213 OFFICE O/P EST LOW 20 MIN: CPT

## 2023-02-28 PROCEDURE — 99072 ADDL SUPL MATRL&STAF TM PHE: CPT
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
## 2023-03-01 ENCOUNTER — APPOINTMENT (OUTPATIENT)
Dept: PLASTIC SURGERY | Facility: CLINIC | Age: 63
End: 2023-03-01
Payer: COMMERCIAL

## 2023-03-01 PROCEDURE — 99072 ADDL SUPL MATRL&STAF TM PHE: CPT

## 2023-03-01 PROCEDURE — 99213 OFFICE O/P EST LOW 20 MIN: CPT

## 2023-03-01 NOTE — PHYSICAL EXAM
[de-identified] : well developed obese female, NAD  [de-identified] : unlabored breathing  [de-identified] : YOVANYR [de-identified] : Left LE - large open partial thickness epidermolysis measuring 29x18 cm anterior surface from ankle to knee with scattered weeping blisters, periwound erythema, compartments soft, FROM/SILT,  [de-identified] : Left shoulder incision healed

## 2023-03-01 NOTE — ASSESSMENT
[FreeTextEntry1] : 61 yo F s/p excision of left posterior shoulder dermatofibroma in 2021. \par \par Now with new traumatic left LE wound with epidermolysis and now weeping blisters. \par \par - blisters punctured and fluid expressed under sterile conditions\par - dressing changed and compression ace wrap applied \par - LWC with Xeroform/kerlix/ace wrap\par - continue drain care and oral antibiotics\par - discussed the importance of leg elevation at all times, preferably above the level of the heart \par - will order VNS for daily wound care\par - all questions answered \par - f/u next week for wound check \par \par COVID protocol maintained.
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
## 2023-03-02 ENCOUNTER — APPOINTMENT (OUTPATIENT)
Dept: PLASTIC SURGERY | Facility: CLINIC | Age: 63
End: 2023-03-02

## 2023-03-06 ENCOUNTER — APPOINTMENT (OUTPATIENT)
Dept: PLASTIC SURGERY | Facility: CLINIC | Age: 63
End: 2023-03-06
Payer: COMMERCIAL

## 2023-03-06 PROCEDURE — 99072 ADDL SUPL MATRL&STAF TM PHE: CPT

## 2023-03-06 PROCEDURE — 99213 OFFICE O/P EST LOW 20 MIN: CPT
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
## 2023-03-06 NOTE — PHYSICAL EXAM
[de-identified] : well developed obese female, NAD  [de-identified] : YOVANYR [de-identified] : unlabored breathing  [de-identified] : Left LE - large open partial thickness epidermolysis measuring 29x18 cm anterior surface from ankle to knee with scattered weeping blisters, improving, proximal aspect by incision demarcating as more ischemic, periwound erythema improving, compartments soft, FROM/SILT, calf tenderness, negative Joceline's  [de-identified] : Left shoulder incision healed

## 2023-03-06 NOTE — ASSESSMENT
[FreeTextEntry1] : 62 yo F s/p excision of left posterior shoulder dermatofibroma in 2021. \par \par Now with new traumatic left LE wound with epidermolysis and now weeping blisters. \par New calf pain r/o DVT. \par \par - blisters punctured and fluid expressed under sterile conditions\par - dressing changed and compression ace wrap applied \par - LWC with Xeroform/kerlix/ace wrap by VNS\par - continue drain care and oral antibiotics\par - venous duplex to r/o DVT\par - discussed the importance of leg elevation at all times, preferably above the level of the heart \par - all questions answered \par - f/u next week for surgical plan with Dr. Pratt. \par \par COVID protocol maintained.

## 2023-03-07 ENCOUNTER — APPOINTMENT (OUTPATIENT)
Dept: VASCULAR SURGERY | Facility: CLINIC | Age: 63
End: 2023-03-07
Payer: COMMERCIAL

## 2023-03-07 VITALS — WEIGHT: 264 LBS | HEIGHT: 64 IN | BODY MASS INDEX: 45.07 KG/M2

## 2023-03-07 DIAGNOSIS — M79.89 OTHER SPECIFIED SOFT TISSUE DISORDERS: ICD-10-CM

## 2023-03-07 PROCEDURE — 99212 OFFICE O/P EST SF 10 MIN: CPT

## 2023-03-07 PROCEDURE — 93971 EXTREMITY STUDY: CPT
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
## 2023-03-07 NOTE — DATA REVIEWED
[FreeTextEntry1] : Venous duplex left leg there is no evidence of acute deep venous thrombosis seen in the lower extremity from the groin to the knee.  The calf veins were not visualized secondary to severe edema
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
## 2023-03-16 ENCOUNTER — APPOINTMENT (OUTPATIENT)
Dept: PLASTIC SURGERY | Facility: CLINIC | Age: 63
End: 2023-03-16
Payer: COMMERCIAL

## 2023-03-16 PROCEDURE — 99212 OFFICE O/P EST SF 10 MIN: CPT

## 2023-03-16 PROCEDURE — 99072 ADDL SUPL MATRL&STAF TM PHE: CPT

## 2023-03-16 NOTE — PHYSICAL EXAM
[de-identified] : well developed obese female, NAD  [de-identified] : unlabored breathing  [de-identified] : YOVANYR [de-identified] : Left LE - large open partial thickness epidermolysis measuring 29x18 cm anterior surface from ankle to knee with scattered weeping blisters, improving, proximal aspect by incision demarcating as more ischemic, periwound erythema improving, compartments soft, FROM/SILT, negative Joceline's  [de-identified] : Left shoulder incision healed
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
## 2023-03-16 NOTE — ASSESSMENT
[FreeTextEntry1] : 64 yo F s/p excision of left posterior shoulder dermatofibroma in 2021. \par \par Now with new traumatic left LE wound with epidermolysis and now weeping blisters. \par New calf pain, venous duplex negative for DVT. \par \par - dressing changed and compression ace wrap applied \par - LWC with Xeroform/kerlix/ace wrap by VNS\par - continue drain care and oral antibiotics\par - discussed the importance of leg elevation at all times, preferably above the level of the heart \par - all questions answered \par - f/u \par \par COVID protocol maintained. \par \par 3/16/2023\par as above\par prelim OR date picked for left leg debridment\par however will wait as wound continues to declare itself\par drain removed today, also several sutures\par \par explained rationale to pt and \par all ?s answered\par \par Due to COVID 19, pre-visit patient instructions were explained to the patient and their symptoms were checked upon arrival.  \par Masks were used by the health care providers and staff and the examination room was cleaned after the patient visit was completed.\par

## 2023-03-23 ENCOUNTER — APPOINTMENT (OUTPATIENT)
Dept: PLASTIC SURGERY | Facility: CLINIC | Age: 63
End: 2023-03-23
Payer: COMMERCIAL

## 2023-03-23 PROCEDURE — 99212 OFFICE O/P EST SF 10 MIN: CPT
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
## 2023-03-23 NOTE — PHYSICAL EXAM
[de-identified] : well developed obese female, NAD  [de-identified] : unlabored breathing  [de-identified] : YOVANYR [de-identified] : Left LE - large open partial thickness epidermolysis measuring 29x18 cm anterior surface from ankle to knee with scattered weeping blisters, improving, proximal aspect by incision demarcating as more ischemic, periwound erythema improving, compartments soft, FROM/SILT, negative Joceline's  [de-identified] : Left shoulder incision healed

## 2023-03-23 NOTE — PHYSICAL EXAM
[de-identified] : well developed obese female, NAD  [de-identified] : unlabored breathing  [de-identified] : YOVANYR [de-identified] : Left LE - large open partial thickness epidermolysis measuring 29x18 cm anterior surface from ankle to knee with scattered weeping blisters, improving, proximal aspect by incision demarcating as more ischemic, periwound erythema improving, compartments soft, FROM/SILT, negative Joceline's  [de-identified] : Left shoulder incision healed

## 2023-03-23 NOTE — ASSESSMENT
[FreeTextEntry1] : 62 yo F s/p excision of left posterior shoulder dermatofibroma in 2021. \par \par Now with new traumatic left LE wound with epidermolysis and now weeping blisters. \par New calf pain, venous duplex negative for DVT. \par \par - dressing changed and compression ace wrap applied \par - LWC with Xeroform/kerlix/ace wrap by VNS\par - discussed the importance of leg elevation at all times, preferably above the level of the heart \par - all questions answered \par - scheduled for STSG 4/12\par \par \par 3/23/2023\par as above\par left left extr wound comtinues to slowly demarcate\par will need stsg\par OR date 4/12/2023 appropriate\par cont local care\par f/u in 10 days\par all ?s answered\par \par Due to COVID 19, pre-visit patient instructions were explained to the patient and their symptoms were checked upon arrival.  \par Masks were used by the health care providers and staff and the examination room was cleaned after the patient visit was completed.\par \par \par \par \par \par

## 2023-03-30 ENCOUNTER — APPOINTMENT (OUTPATIENT)
Dept: VASCULAR SURGERY | Facility: CLINIC | Age: 63
End: 2023-03-30
Payer: COMMERCIAL

## 2023-03-30 VITALS
BODY MASS INDEX: 45.07 KG/M2 | HEIGHT: 64 IN | DIASTOLIC BLOOD PRESSURE: 72 MMHG | SYSTOLIC BLOOD PRESSURE: 110 MMHG | WEIGHT: 264 LBS

## 2023-03-30 PROCEDURE — 99212 OFFICE O/P EST SF 10 MIN: CPT

## 2023-03-30 PROCEDURE — 93971 EXTREMITY STUDY: CPT | Mod: LT

## 2023-03-30 NOTE — DATA REVIEWED
[FreeTextEntry1] : Venous duplex left leg: there is no evidence of acute deep venous thrombosis seen in the lower extremity from the groin to the knee.  The calf veins were not visualized secondary to severe edema
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
## 2023-04-04 ENCOUNTER — APPOINTMENT (OUTPATIENT)
Dept: PLASTIC SURGERY | Facility: CLINIC | Age: 63
End: 2023-04-04
Payer: COMMERCIAL

## 2023-04-04 PROCEDURE — 99212 OFFICE O/P EST SF 10 MIN: CPT

## 2023-04-04 NOTE — ASSESSMENT
[FreeTextEntry1] : 62 yo F s/p excision of left posterior shoulder dermatofibroma in 2021. \par \par Now with new traumatic left LE wound with epidermolysis and now weeping blisters. \par New calf pain, venous duplex negative for DVT. \par \par - dressing changed and compression ace wrap applied \par - LWC with Xeroform/kerlix/ace wrap by VNS\par - discussed the importance of leg elevation at all times, preferably above the level of the heart \par - all questions answered \par - scheduled for STSG 4/12\par \par \par 3/23/2023\par as above\par left left extr wound comtinues to slowly demarcate\par will need stsg\par OR date 4/12/2023 appropriate\par cont local care\par f/u in 10 days\par all ?s answered\par \par Due to COVID 19, pre-visit patient instructions were explained to the patient and their symptoms were checked upon arrival.  \par Masks were used by the health care providers and staff and the examination room was cleaned after the patient visit was completed.\par \par 4/4/2023\par LE duplex last week--no DVT\par \par  left lateral calf wound measure approx 15cm x 13cm in greatest dimension\par \par scheduled for STSG 4/12/2023\par \par cont xerofrom dressing changes\par \par will take aspirin morning of surgery then for one month\par \par \par \par

## 2023-04-04 NOTE — PHYSICAL EXAM
[de-identified] : well developed obese female, NAD  [de-identified] : unlabored breathing  [de-identified] : YOVANYR [de-identified] : Left LE - large open partial thickness epidermolysis measuring 29x18 cm anterior surface from ankle to knee with scattered weeping blisters, improving, proximal aspect by incision demarcating as more ischemic, periwound erythema improving, compartments soft, FROM/SILT, negative Joceline's  [de-identified] : Left shoulder incision healed

## 2023-04-05 DIAGNOSIS — M79.2 NEURALGIA AND NEURITIS, UNSPECIFIED: ICD-10-CM

## 2023-04-06 ENCOUNTER — APPOINTMENT (OUTPATIENT)
Dept: PLASTIC SURGERY | Facility: CLINIC | Age: 63
End: 2023-04-06

## 2023-04-12 ENCOUNTER — APPOINTMENT (OUTPATIENT)
Dept: PLASTIC SURGERY | Facility: AMBULATORY SURGERY CENTER | Age: 63
End: 2023-04-12
Payer: COMMERCIAL

## 2023-04-12 ENCOUNTER — OUTPATIENT (OUTPATIENT)
Dept: INPATIENT UNIT | Facility: HOSPITAL | Age: 63
LOS: 1 days | Discharge: HOME | End: 2023-04-12

## 2023-04-12 ENCOUNTER — TRANSCRIPTION ENCOUNTER (OUTPATIENT)
Age: 63
End: 2023-04-12

## 2023-04-12 VITALS
DIASTOLIC BLOOD PRESSURE: 56 MMHG | OXYGEN SATURATION: 95 % | SYSTOLIC BLOOD PRESSURE: 118 MMHG | RESPIRATION RATE: 17 BRPM | HEART RATE: 84 BPM

## 2023-04-12 VITALS
HEART RATE: 71 BPM | RESPIRATION RATE: 18 BRPM | TEMPERATURE: 98 F | OXYGEN SATURATION: 99 % | SYSTOLIC BLOOD PRESSURE: 141 MMHG | DIASTOLIC BLOOD PRESSURE: 80 MMHG | WEIGHT: 255.74 LBS | HEIGHT: 64 IN
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
## 2023-04-12 DIAGNOSIS — S81.802D UNSPECIFIED OPEN WOUND, LEFT LOWER LEG, SUBSEQUENT ENCOUNTER: ICD-10-CM

## 2023-04-12 DIAGNOSIS — S81.802A UNSPECIFIED OPEN WOUND, LEFT LOWER LEG, INITIAL ENCOUNTER: ICD-10-CM

## 2023-04-12 DIAGNOSIS — Z96.652 PRESENCE OF LEFT ARTIFICIAL KNEE JOINT: Chronic | ICD-10-CM

## 2023-04-12 DIAGNOSIS — Z90.49 ACQUIRED ABSENCE OF OTHER SPECIFIED PARTS OF DIGESTIVE TRACT: Chronic | ICD-10-CM

## 2023-04-12 DIAGNOSIS — E03.9 HYPOTHYROIDISM, UNSPECIFIED: ICD-10-CM

## 2023-04-12 DIAGNOSIS — Z98.890 OTHER SPECIFIED POSTPROCEDURAL STATES: Chronic | ICD-10-CM

## 2023-04-12 DIAGNOSIS — I10 ESSENTIAL (PRIMARY) HYPERTENSION: ICD-10-CM

## 2023-04-12 DIAGNOSIS — Z91.040 LATEX ALLERGY STATUS: ICD-10-CM

## 2023-04-12 DIAGNOSIS — Z90.710 ACQUIRED ABSENCE OF BOTH CERVIX AND UTERUS: Chronic | ICD-10-CM

## 2023-04-12 PROCEDURE — 15002 WOUND PREP TRK/ARM/LEG: CPT

## 2023-04-12 PROCEDURE — 15003 WOUND PREP ADDL 100 CM: CPT

## 2023-04-12 PROCEDURE — 15100 SPLT AGRFT T/A/L 1ST 100SQCM: CPT

## 2023-04-12 PROCEDURE — 15101 SPLT AGRFT T/A/L EA ADDL 100: CPT

## 2023-04-12 RX ORDER — NIFEDIPINE 30 MG
1 TABLET, EXTENDED RELEASE 24 HR ORAL
Qty: 0 | Refills: 0 | DISCHARGE

## 2023-04-12 RX ORDER — ASPIRIN/CALCIUM CARB/MAGNESIUM 324 MG
1 TABLET ORAL
Qty: 0 | Refills: 0 | DISCHARGE

## 2023-04-12 RX ORDER — ONDANSETRON 8 MG/1
4 TABLET, FILM COATED ORAL ONCE
Refills: 0 | Status: DISCONTINUED | OUTPATIENT
Start: 2023-04-12 | End: 2023-04-26

## 2023-04-12 RX ORDER — CHOLECALCIFEROL (VITAMIN D3) 125 MCG
1 CAPSULE ORAL
Qty: 0 | Refills: 0 | DISCHARGE

## 2023-04-12 RX ORDER — ANASTROZOLE 1 MG/1
1 TABLET ORAL
Qty: 0 | Refills: 0 | DISCHARGE

## 2023-04-12 RX ORDER — HYDROCHLOROTHIAZIDE 25 MG
1 TABLET ORAL
Qty: 0 | Refills: 0 | DISCHARGE

## 2023-04-12 RX ORDER — FOLIC ACID 0.8 MG
1 TABLET ORAL
Qty: 0 | Refills: 0 | DISCHARGE

## 2023-04-12 RX ORDER — SODIUM CHLORIDE 9 MG/ML
1000 INJECTION, SOLUTION INTRAVENOUS
Refills: 0 | Status: DISCONTINUED | OUTPATIENT
Start: 2023-04-12 | End: 2023-04-26

## 2023-04-12 RX ORDER — METOPROLOL TARTRATE 50 MG
1 TABLET ORAL
Qty: 0 | Refills: 0 | DISCHARGE

## 2023-04-12 RX ORDER — POTASSIUM CHLORIDE 20 MEQ
1 PACKET (EA) ORAL
Qty: 0 | Refills: 0 | DISCHARGE

## 2023-04-12 RX ORDER — OXYCODONE HYDROCHLORIDE 5 MG/1
5 TABLET ORAL ONCE
Refills: 0 | Status: DISCONTINUED | OUTPATIENT
Start: 2023-04-12 | End: 2023-04-12

## 2023-04-12 RX ORDER — HEPARIN SODIUM 5000 [USP'U]/ML
7500 INJECTION INTRAVENOUS; SUBCUTANEOUS ONCE
Refills: 0 | Status: DISCONTINUED | OUTPATIENT
Start: 2023-04-12 | End: 2023-04-26

## 2023-04-12 RX ORDER — METHYLPREDNISOLONE 4 MG
1 TABLET ORAL
Qty: 0 | Refills: 0 | DISCHARGE

## 2023-04-12 RX ORDER — HYDROMORPHONE HYDROCHLORIDE 2 MG/ML
0.5 INJECTION INTRAMUSCULAR; INTRAVENOUS; SUBCUTANEOUS
Refills: 0 | Status: DISCONTINUED | OUTPATIENT
Start: 2023-04-12 | End: 2023-04-12

## 2023-04-12 NOTE — CHART NOTE - NSCHARTNOTEFT_GEN_A_CORE
PACU ANESTHESIA ADMISSION NOTE      Procedure: Application of split-thickness skin graft (STSG) to lower leg    Initial intraoperative application of wound vacuum-assisted closure device      Post op diagnosis:  Open wound of left lower leg with complication        ____  Intubated  TV:______       Rate: ______      FiO2: ______    __x__  Patent Airway    __x__  Full return of protective reflexes    __x__  Full recovery from anesthesia / back to baseline     Vitals:   T: 37          R:  18                BP: 142/62                 Sat:  100                 P: 90      Mental Status:  __x__ Awake   ___x__ Alert   _____ Drowsy   _____ Sedated    Nausea/Vomiting:  _x___ NO  ______Yes,   See Post - Op Orders          Pain Scale (0-10):  _____    Treatment: __x__ None    ____ See Post - Op/PCA Orders    Post - Operative Fluids:   ____ Oral   ___x_ See Post - Op Orders    Plan: Discharge:   __x__Home       _____Floor     _____Critical Care    _____  Other:_________________    Comments:    Uneventful anesthesia. Patient transported to  spontaneously breathing and hemodynamically stable.

## 2023-04-12 NOTE — PRE-ANESTHESIA EVALUATION ADULT - NSANTHOSAYNRD_GEN_A_CORE
had sleep study/No. KEL screening performed.  STOP BANG Legend: 0-2 = LOW Risk; 3-4 = INTERMEDIATE Risk; 5-8 = HIGH Risk
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
## 2023-04-12 NOTE — BRIEF OPERATIVE NOTE - NSICDXBRIEFPREOP_GEN_ALL_CORE_FT
PRE-OP DIAGNOSIS:  Open wound of left lower leg with complication 12-Apr-2023 09:22:23  Raissa Santiago

## 2023-04-12 NOTE — ASU DISCHARGE PLAN (ADULT/PEDIATRIC) - CARE PROVIDER_API CALL
Manuel Pratt)  Plastic Surgery; Surgery of the Hand  [address] [place] [address], [address]  [place] [address]  Phone: [phone]  Fax: [phone]  Established Patient  Follow Up Time: 1 week

## 2023-04-12 NOTE — BRIEF OPERATIVE NOTE - NSICDXBRIEFPROCEDURE_GEN_ALL_CORE_FT
PROCEDURES:  Application of split-thickness skin graft (STSG) to lower leg 12-Apr-2023 09:20:06  Raissa Santiago  Initial intraoperative application of wound vacuum-assisted closure device 12-Apr-2023 09:21:51  Raissa Santiago  
no

## 2023-04-12 NOTE — ASU DISCHARGE PLAN (ADULT/PEDIATRIC) - ASU DC SPECIAL INSTRUCTIONSFT
Please follow up with Dr. Pratt. Please call his office at the number provided to schedule this appointment. Please call within 48 hours of leaving the hospital.     NOTIFY YOUR SURGEON IF YOU HAVE: any bleeding that does not stop, any pus draining from your wound(s), any fever (over 100.4 F) persistent nausea/vomiting, or if your pain is not controlled on your discharge pain medications, unable to urinate.    ACTIVITY: Please refrain from increased physical activity.  Please do not lift anything heavier than a gallon of milk or about 5 pounds. Upon follow up you will be given further instructions on how much physical activity you may do.     ANTIBIOTICS: Please take any prescribed antibiotics as directed. These will be sent to your designated pharmacy    Dressing: please leave dressing in place until follow up. Please keep the dressing clean and dry while you recover.     Leave your VAC in place. We will remove it when we see you next week in the office. **IF THE VAC LOOSES SUCTION, PLEASE DISCONNECT THE TUBE, CLAMP THE HOSE, AND LEAVE IT IN PLACE.     Please take daily Aspirin.

## 2023-04-12 NOTE — ASU DISCHARGE PLAN (ADULT/PEDIATRIC) - NS MD DC FALL RISK RISK
For information on Fall & Injury Prevention, visit: [email or web address] OR  [email or web address] OR  https://www.cdc.gov/steadi/patient.html

## 2023-04-12 NOTE — BRIEF OPERATIVE NOTE - NSICDXBRIEFPOSTOP_GEN_ALL_CORE_FT
POST-OP DIAGNOSIS:  Open wound of left lower leg with complication 12-Apr-2023 09:22:27  Raissa Santiago

## 2023-04-13 ENCOUNTER — APPOINTMENT (OUTPATIENT)
Dept: HEMATOLOGY ONCOLOGY | Facility: CLINIC | Age: 63
End: 2023-04-13

## 2023-04-13 ENCOUNTER — APPOINTMENT (OUTPATIENT)
Dept: PLASTIC SURGERY | Facility: CLINIC | Age: 63
End: 2023-04-13
Payer: COMMERCIAL

## 2023-04-13 PROCEDURE — 99024 POSTOP FOLLOW-UP VISIT: CPT

## 2023-04-13 NOTE — ASSESSMENT
[FreeTextEntry1] : pt returned to office both yesterday and today for bandage change of left thigh skin graft donor site--STSG was done yesterday\par \par fair amount of anxiety\par \par reassurance given

## 2023-04-19 ENCOUNTER — APPOINTMENT (OUTPATIENT)
Dept: PLASTIC SURGERY | Facility: CLINIC | Age: 63
End: 2023-04-19
Payer: COMMERCIAL

## 2023-04-19 PROCEDURE — 99024 POSTOP FOLLOW-UP VISIT: CPT

## 2023-04-19 NOTE — PHYSICAL EXAM
[de-identified] : well developed obese female, NAD  [de-identified] : unlabored breathing  [de-identified] : YOVANYR [de-identified] : Left LE - large open wound with adherent and well incorporated skin graft, perfect take, periwound clean, staples in place, swelling as expected \par donor site left anterior thigh epithelializing, no active bleeding  [de-identified] : Left shoulder incision healed
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
## 2023-04-19 NOTE — ASSESSMENT
[FreeTextEntry1] : 64 yo F s/p excision of left posterior shoulder dermatofibroma in 2021. \par \par Now with new traumatic left LE wound with epidermolysis and now weeping blisters. \par New calf pain, venous duplex negative for DVT. \par \par POD#7 s/p STSG to LLE open wound with application of VAC dressing. Doing well. \par \par - VAC dressing removed\par - Xeroform/Bacitracin and compression dressing applied to graft site\par - Adaptin/Bacitracin/Telfa to donor site\par - keep graft dry \par - leg rest and elevation\par - limit activities \par - post-op instructions reviewed and all questions answered \par - f/u next week for graft check \par \par \par \par \par \par

## 2023-04-20 ENCOUNTER — APPOINTMENT (OUTPATIENT)
Dept: PLASTIC SURGERY | Facility: CLINIC | Age: 63
End: 2023-04-20
Payer: COMMERCIAL

## 2023-04-20 ENCOUNTER — NON-APPOINTMENT (OUTPATIENT)
Age: 63
End: 2023-04-20

## 2023-04-20 PROCEDURE — 99024 POSTOP FOLLOW-UP VISIT: CPT

## 2023-04-20 NOTE — PHYSICAL EXAM
[de-identified] : well developed obese female, NAD  [de-identified] : unlabored breathing  [de-identified] : YOVANYR [de-identified] : Left LE - large open wound with adherent and well incorporated skin graft, perfect take, periwound clean, staples in place, swelling as expected \par donor site left anterior thigh epithelializing, dressing saturated but  no active bleeding  [de-identified] : Left shoulder incision healed

## 2023-04-20 NOTE — ASSESSMENT
[FreeTextEntry1] : 64 yo F s/p excision of left posterior shoulder dermatofibroma in 2021. \par \par Now with new traumatic left LE wound with epidermolysis and now weeping blisters. \par New calf pain, venous duplex negative for DVT. \par \par Now POD#8 s/p STSG to LLE open wound with application of VAC dressing. Doing well. \par \par - donor site dressing changed\par - Xeroform/Bacitracin and compression dressing applied to graft site\par - keep graft dry \par - leg rest and elevation\par - limit activities \par - post-op instructions reviewed and all questions answered \par - f/u next week for graft check \par \par \par \par \par \par
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
## 2023-04-25 ENCOUNTER — APPOINTMENT (OUTPATIENT)
Dept: PLASTIC SURGERY | Facility: CLINIC | Age: 63
End: 2023-04-25
Payer: COMMERCIAL

## 2023-04-25 PROCEDURE — 99024 POSTOP FOLLOW-UP VISIT: CPT

## 2023-04-25 NOTE — PHYSICAL EXAM
[de-identified] : well developed obese female, NAD  [de-identified] : unlabored breathing  [de-identified] : YOVANYR [de-identified] : Left LE - large open wound with adherent and well incorporated skin graft, perfect take, periwound clean, staples in place, swelling as expected \par donor site left anterior thigh epithelializing, dressing saturated but  no active bleeding  [de-identified] : Left shoulder incision healed
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
## 2023-04-25 NOTE — ASSESSMENT
[FreeTextEntry1] : 64 yo F s/p excision of left posterior shoulder dermatofibroma in 2021. \par \par Now with new traumatic left LE wound with epidermolysis and now weeping blisters. \par New calf pain, venous duplex negative for DVT. \par \par Now POD#8 s/p STSG to LLE open wound with application of VAC dressing. Doing well. \par \par - donor site dressing changed\par - Xeroform/Bacitracin and compression dressing applied to graft site\par - keep graft dry \par - leg rest and elevation\par - limit activities \par - post-op instructions reviewed and all questions answered \par - f/u next week for graft check \par \par \par 4/25/2023\par as above\par dressings both changed\par skin graft approx 80% take--good result considering nature of injury and comorbidities\par donor site left thigh fine\par \par dressing reapplied\par \par f/u in one week for staple removal\par \par \par \par \par

## 2023-05-02 ENCOUNTER — APPOINTMENT (OUTPATIENT)
Dept: PLASTIC SURGERY | Facility: CLINIC | Age: 63
End: 2023-05-02
Payer: COMMERCIAL

## 2023-05-02 PROCEDURE — 99024 POSTOP FOLLOW-UP VISIT: CPT

## 2023-05-02 NOTE — ASSESSMENT
[FreeTextEntry1] : 64 yo F s/p excision of left posterior shoulder dermatofibroma in 2021. \par \par Now with new traumatic left LE wound with epidermolysis and now weeping blisters. \par New calf pain, venous duplex negative for DVT. \par \par Now POD#20 s/p STSG to LLE open wound with application of VAC dressing. Doing well. \par \par - staples removed and dressing changed \par - Xeroform/Bacitracin and compression dressing applied to graft site\par - keep graft dry \par - leg rest and elevation\par - limit activities, no gym \par - post-op instructions reviewed and all questions answered \par - f/u next week for graft check \par \par \par \par \par \par \par \par
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
## 2023-05-02 NOTE — PHYSICAL EXAM
[de-identified] : well developed obese female, NAD  [de-identified] : unlabored breathing  [de-identified] : YOVANYR [de-identified] : Left LE - large open wound with adherent and well incorporated skin graft, good overall take, periwound clean, staples in place, swelling as expected \par donor site left anterior thigh epithelializing, dry  [de-identified] : Left shoulder incision healed

## 2023-05-03 ENCOUNTER — APPOINTMENT (OUTPATIENT)
Dept: PLASTIC SURGERY | Facility: CLINIC | Age: 63
End: 2023-05-03
Payer: COMMERCIAL

## 2023-05-03 PROCEDURE — 99024 POSTOP FOLLOW-UP VISIT: CPT

## 2023-05-03 NOTE — PHYSICAL EXAM
[de-identified] : well developed obese female, NAD  [de-identified] : unlabored breathing  [de-identified] : YOVANYR [de-identified] : Left LE - large open wound with adherent and well incorporated skin graft, good overall take, delayed healing with patchy take over areas of fat, periwound clean, swelling as expected \par donor site left anterior thigh epithelializing, dry  [de-identified] : Left shoulder incision healed
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
## 2023-05-03 NOTE — ASSESSMENT
[FreeTextEntry1] : 64 yo F s/p excision of left posterior shoulder dermatofibroma in 2021. \par \par Now with new traumatic left LE wound with epidermolysis and now weeping blisters. \par New calf pain, venous duplex negative for DVT. \par \par Now POD#21 s/p STSG to LLE open wound with application of VAC dressing. Doing well. \par \par - dressing changed \par - Xeroform/Bacitracin and compression dressing applied to graft site\par - keep graft dry \par - leg rest and elevation\par - limit activities, no gym \par - post-op instructions reviewed and all questions answered \par - f/u next week for graft check \par \par \par \par \par \par \par \par

## 2023-05-07 ENCOUNTER — EMERGENCY (EMERGENCY)
Facility: HOSPITAL | Age: 63
LOS: 0 days | Discharge: HOME | End: 2023-05-08
Attending: STUDENT IN AN ORGANIZED HEALTH CARE EDUCATION/TRAINING PROGRAM
Payer: COMMERCIAL

## 2023-05-07 VITALS
DIASTOLIC BLOOD PRESSURE: 84 MMHG | TEMPERATURE: 98 F | WEIGHT: 270.07 LBS | HEIGHT: 64 IN | RESPIRATION RATE: 18 BRPM | HEART RATE: 86 BPM | OXYGEN SATURATION: 100 % | SYSTOLIC BLOOD PRESSURE: 140 MMHG

## 2023-05-07 DIAGNOSIS — Z88.6 ALLERGY STATUS TO ANALGESIC AGENT: ICD-10-CM

## 2023-05-07 DIAGNOSIS — K21.9 GASTRO-ESOPHAGEAL REFLUX DISEASE WITHOUT ESOPHAGITIS: ICD-10-CM

## 2023-05-07 DIAGNOSIS — R07.2 PRECORDIAL PAIN: ICD-10-CM

## 2023-05-07 DIAGNOSIS — Z84.1 FAMILY HISTORY OF DISORDERS OF KIDNEY AND URETER: ICD-10-CM

## 2023-05-07 DIAGNOSIS — Z82.49 FAMILY HISTORY OF ISCHEMIC HEART DISEASE AND OTHER DISEASES OF THE CIRCULATORY SYSTEM: ICD-10-CM

## 2023-05-07 DIAGNOSIS — R07.89 OTHER CHEST PAIN: ICD-10-CM

## 2023-05-07 DIAGNOSIS — M19.90 UNSPECIFIED OSTEOARTHRITIS, UNSPECIFIED SITE: ICD-10-CM

## 2023-05-07 DIAGNOSIS — Z98.890 OTHER SPECIFIED POSTPROCEDURAL STATES: Chronic | ICD-10-CM

## 2023-05-07 DIAGNOSIS — Z90.711 ACQUIRED ABSENCE OF UTERUS WITH REMAINING CERVICAL STUMP: ICD-10-CM

## 2023-05-07 DIAGNOSIS — Z96.652 PRESENCE OF LEFT ARTIFICIAL KNEE JOINT: ICD-10-CM

## 2023-05-07 DIAGNOSIS — Z90.49 ACQUIRED ABSENCE OF OTHER SPECIFIED PARTS OF DIGESTIVE TRACT: Chronic | ICD-10-CM

## 2023-05-07 DIAGNOSIS — Z90.710 ACQUIRED ABSENCE OF BOTH CERVIX AND UTERUS: Chronic | ICD-10-CM

## 2023-05-07 DIAGNOSIS — R91.1 SOLITARY PULMONARY NODULE: ICD-10-CM

## 2023-05-07 DIAGNOSIS — Z90.49 ACQUIRED ABSENCE OF OTHER SPECIFIED PARTS OF DIGESTIVE TRACT: ICD-10-CM

## 2023-05-07 DIAGNOSIS — Z96.652 PRESENCE OF LEFT ARTIFICIAL KNEE JOINT: Chronic | ICD-10-CM

## 2023-05-07 DIAGNOSIS — Z85.820 PERSONAL HISTORY OF MALIGNANT MELANOMA OF SKIN: ICD-10-CM

## 2023-05-07 DIAGNOSIS — Z91.02 FOOD ADDITIVES ALLERGY STATUS: ICD-10-CM

## 2023-05-07 DIAGNOSIS — I10 ESSENTIAL (PRIMARY) HYPERTENSION: ICD-10-CM

## 2023-05-07 DIAGNOSIS — K52.9 NONINFECTIVE GASTROENTERITIS AND COLITIS, UNSPECIFIED: ICD-10-CM

## 2023-05-07 DIAGNOSIS — E89.0 POSTPROCEDURAL HYPOTHYROIDISM: ICD-10-CM

## 2023-05-07 DIAGNOSIS — Z85.850 PERSONAL HISTORY OF MALIGNANT NEOPLASM OF THYROID: ICD-10-CM

## 2023-05-07 DIAGNOSIS — Z82.0 FAMILY HISTORY OF EPILEPSY AND OTHER DISEASES OF THE NERVOUS SYSTEM: ICD-10-CM

## 2023-05-07 DIAGNOSIS — Z88.8 ALLERGY STATUS TO OTHER DRUGS, MEDICAMENTS AND BIOLOGICAL SUBSTANCES: ICD-10-CM

## 2023-05-07 DIAGNOSIS — Z91.040 LATEX ALLERGY STATUS: ICD-10-CM

## 2023-05-07 DIAGNOSIS — Z85.3 PERSONAL HISTORY OF MALIGNANT NEOPLASM OF BREAST: ICD-10-CM

## 2023-05-07 LAB
ALBUMIN SERPL ELPH-MCNC: 4.1 G/DL — SIGNIFICANT CHANGE UP (ref 3.5–5.2)
ALP SERPL-CCNC: 91 U/L — SIGNIFICANT CHANGE UP (ref 30–115)
ALT FLD-CCNC: 16 U/L — SIGNIFICANT CHANGE UP (ref 0–41)
ANION GAP SERPL CALC-SCNC: 9 MMOL/L — SIGNIFICANT CHANGE UP (ref 7–14)
ANISOCYTOSIS BLD QL: SLIGHT — SIGNIFICANT CHANGE UP
AST SERPL-CCNC: 18 U/L — SIGNIFICANT CHANGE UP (ref 0–41)
BASOPHILS # BLD AUTO: 0.06 K/UL — SIGNIFICANT CHANGE UP (ref 0–0.2)
BASOPHILS NFR BLD AUTO: 0.9 % — SIGNIFICANT CHANGE UP (ref 0–1)
BILIRUB SERPL-MCNC: 0.5 MG/DL — SIGNIFICANT CHANGE UP (ref 0.2–1.2)
BUN SERPL-MCNC: 15 MG/DL — SIGNIFICANT CHANGE UP (ref 10–20)
CALCIUM SERPL-MCNC: 8.9 MG/DL — SIGNIFICANT CHANGE UP (ref 8.4–10.5)
CHLORIDE SERPL-SCNC: 103 MMOL/L — SIGNIFICANT CHANGE UP (ref 98–110)
CO2 SERPL-SCNC: 27 MMOL/L — SIGNIFICANT CHANGE UP (ref 17–32)
CREAT SERPL-MCNC: 0.7 MG/DL — SIGNIFICANT CHANGE UP (ref 0.7–1.5)
D DIMER BLD IA.RAPID-MCNC: 542 NG/ML DDU — HIGH
EGFR: 98 ML/MIN/1.73M2 — SIGNIFICANT CHANGE UP
ELLIPTOCYTES BLD QL SMEAR: SLIGHT — SIGNIFICANT CHANGE UP
EOSINOPHIL # BLD AUTO: 0.18 K/UL — SIGNIFICANT CHANGE UP (ref 0–0.7)
EOSINOPHIL NFR BLD AUTO: 2.6 % — SIGNIFICANT CHANGE UP (ref 0–8)
GIANT PLATELETS BLD QL SMEAR: PRESENT — SIGNIFICANT CHANGE UP
GLUCOSE SERPL-MCNC: 99 MG/DL — SIGNIFICANT CHANGE UP (ref 70–99)
HCT VFR BLD CALC: 36.7 % — LOW (ref 37–47)
HGB BLD-MCNC: 11.3 G/DL — LOW (ref 12–16)
HYPOCHROMIA BLD QL: SLIGHT — SIGNIFICANT CHANGE UP
LIDOCAIN IGE QN: 32 U/L — SIGNIFICANT CHANGE UP (ref 7–60)
LYMPHOCYTES # BLD AUTO: 0.95 K/UL — LOW (ref 1.2–3.4)
LYMPHOCYTES # BLD AUTO: 14 % — LOW (ref 20.5–51.1)
MAGNESIUM SERPL-MCNC: 1.7 MG/DL — LOW (ref 1.8–2.4)
MANUAL SMEAR VERIFICATION: SIGNIFICANT CHANGE UP
MCHC RBC-ENTMCNC: 19.9 PG — LOW (ref 27–31)
MCHC RBC-ENTMCNC: 30.8 G/DL — LOW (ref 32–37)
MCV RBC AUTO: 64.5 FL — LOW (ref 81–99)
MICROCYTES BLD QL: SLIGHT — SIGNIFICANT CHANGE UP
MONOCYTES # BLD AUTO: 0.18 K/UL — SIGNIFICANT CHANGE UP (ref 0.1–0.6)
MONOCYTES NFR BLD AUTO: 2.6 % — SIGNIFICANT CHANGE UP (ref 1.7–9.3)
NEUTROPHILS # BLD AUTO: 5.29 K/UL — SIGNIFICANT CHANGE UP (ref 1.4–6.5)
NEUTROPHILS NFR BLD AUTO: 78.1 % — HIGH (ref 42.2–75.2)
NT-PROBNP SERPL-SCNC: 184 PG/ML — SIGNIFICANT CHANGE UP (ref 0–300)
OVALOCYTES BLD QL SMEAR: SLIGHT — SIGNIFICANT CHANGE UP
PLAT MORPH BLD: NORMAL — SIGNIFICANT CHANGE UP
PLATELET # BLD AUTO: 260 K/UL — SIGNIFICANT CHANGE UP (ref 130–400)
PMV BLD: 10.9 FL — HIGH (ref 7.4–10.4)
POIKILOCYTOSIS BLD QL AUTO: SLIGHT — SIGNIFICANT CHANGE UP
POLYCHROMASIA BLD QL SMEAR: SLIGHT — SIGNIFICANT CHANGE UP
POTASSIUM SERPL-MCNC: 3.7 MMOL/L — SIGNIFICANT CHANGE UP (ref 3.5–5)
POTASSIUM SERPL-SCNC: 3.7 MMOL/L — SIGNIFICANT CHANGE UP (ref 3.5–5)
PROT SERPL-MCNC: 6.8 G/DL — SIGNIFICANT CHANGE UP (ref 6–8)
RBC # BLD: 5.69 M/UL — HIGH (ref 4.2–5.4)
RBC # FLD: 17.2 % — HIGH (ref 11.5–14.5)
RBC BLD AUTO: ABNORMAL
SODIUM SERPL-SCNC: 139 MMOL/L — SIGNIFICANT CHANGE UP (ref 135–146)
STOMATOCYTES BLD QL SMEAR: SLIGHT — SIGNIFICANT CHANGE UP
TROPONIN T SERPL-MCNC: <0.01 NG/ML — SIGNIFICANT CHANGE UP
TROPONIN T SERPL-MCNC: <0.01 NG/ML — SIGNIFICANT CHANGE UP
VARIANT LYMPHS # BLD: 1.8 % — SIGNIFICANT CHANGE UP (ref 0–5)
WBC # BLD: 6.77 K/UL — SIGNIFICANT CHANGE UP (ref 4.8–10.8)
WBC # FLD AUTO: 6.77 K/UL — SIGNIFICANT CHANGE UP (ref 4.8–10.8)

## 2023-05-07 PROCEDURE — 85025 COMPLETE CBC W/AUTO DIFF WBC: CPT

## 2023-05-07 PROCEDURE — 80053 COMPREHEN METABOLIC PANEL: CPT

## 2023-05-07 PROCEDURE — 99223 1ST HOSP IP/OBS HIGH 75: CPT

## 2023-05-07 PROCEDURE — G0378: CPT

## 2023-05-07 PROCEDURE — 93005 ELECTROCARDIOGRAM TRACING: CPT

## 2023-05-07 PROCEDURE — 71045 X-RAY EXAM CHEST 1 VIEW: CPT | Mod: 26

## 2023-05-07 PROCEDURE — 36415 COLL VENOUS BLD VENIPUNCTURE: CPT

## 2023-05-07 PROCEDURE — 71275 CT ANGIOGRAPHY CHEST: CPT | Mod: MA

## 2023-05-07 PROCEDURE — 83735 ASSAY OF MAGNESIUM: CPT

## 2023-05-07 PROCEDURE — 93010 ELECTROCARDIOGRAM REPORT: CPT

## 2023-05-07 PROCEDURE — 71275 CT ANGIOGRAPHY CHEST: CPT | Mod: 26,MA

## 2023-05-07 PROCEDURE — 85379 FIBRIN DEGRADATION QUANT: CPT

## 2023-05-07 PROCEDURE — 84484 ASSAY OF TROPONIN QUANT: CPT

## 2023-05-07 PROCEDURE — 99285 EMERGENCY DEPT VISIT HI MDM: CPT | Mod: 25

## 2023-05-07 PROCEDURE — 71045 X-RAY EXAM CHEST 1 VIEW: CPT

## 2023-05-07 PROCEDURE — 96374 THER/PROPH/DIAG INJ IV PUSH: CPT | Mod: XU

## 2023-05-07 PROCEDURE — G1004: CPT

## 2023-05-07 PROCEDURE — 83690 ASSAY OF LIPASE: CPT

## 2023-05-07 PROCEDURE — 83880 ASSAY OF NATRIURETIC PEPTIDE: CPT

## 2023-05-07 PROCEDURE — 93017 CV STRESS TEST TRACING ONLY: CPT

## 2023-05-07 PROCEDURE — 78452 HT MUSCLE IMAGE SPECT MULT: CPT | Mod: ME

## 2023-05-07 RX ORDER — FOLIC ACID 0.8 MG
1 TABLET ORAL DAILY
Refills: 0 | Status: DISCONTINUED | OUTPATIENT
Start: 2023-05-07 | End: 2023-05-08

## 2023-05-07 RX ORDER — ACETAMINOPHEN 500 MG
650 TABLET ORAL ONCE
Refills: 0 | Status: COMPLETED | OUTPATIENT
Start: 2023-05-07 | End: 2023-05-07

## 2023-05-07 RX ORDER — REGADENOSON 0.08 MG/ML
0.4 INJECTION, SOLUTION INTRAVENOUS ONCE
Refills: 0 | Status: DISCONTINUED | OUTPATIENT
Start: 2023-05-07 | End: 2023-05-08

## 2023-05-07 RX ORDER — NIFEDIPINE 30 MG
30 TABLET, EXTENDED RELEASE 24 HR ORAL DAILY
Refills: 0 | Status: DISCONTINUED | OUTPATIENT
Start: 2023-05-07 | End: 2023-05-08

## 2023-05-07 RX ORDER — ASPIRIN/CALCIUM CARB/MAGNESIUM 324 MG
325 TABLET ORAL DAILY
Refills: 0 | Status: DISCONTINUED | OUTPATIENT
Start: 2023-05-07 | End: 2023-05-08

## 2023-05-07 RX ORDER — ANASTROZOLE 1 MG/1
1 TABLET ORAL AT BEDTIME
Refills: 0 | Status: DISCONTINUED | OUTPATIENT
Start: 2023-05-07 | End: 2023-05-08

## 2023-05-07 RX ORDER — LEVOTHYROXINE SODIUM 125 MCG
200 TABLET ORAL DAILY
Refills: 0 | Status: DISCONTINUED | OUTPATIENT
Start: 2023-05-07 | End: 2023-05-08

## 2023-05-07 RX ORDER — MAGNESIUM SULFATE 500 MG/ML
2 VIAL (ML) INJECTION ONCE
Refills: 0 | Status: COMPLETED | OUTPATIENT
Start: 2023-05-07 | End: 2023-05-07

## 2023-05-07 RX ADMIN — Medication 25 GRAM(S): at 12:49

## 2023-05-07 RX ADMIN — ANASTROZOLE 1 MILLIGRAM(S): 1 TABLET ORAL at 22:15

## 2023-05-07 RX ADMIN — Medication 650 MILLIGRAM(S): at 21:34
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
## 2023-05-07 NOTE — ED PROVIDER NOTE - CONSIDERATION OF ADMISSION OBSERVATION
Consideration of Admission/Observation Patient with unexplained chest pain, work up in ED negative but with risk factors. Will obs for further ACS rule out.
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
## 2023-05-07 NOTE — ED PROVIDER NOTE - CLINICAL SUMMARY MEDICAL DECISION MAKING FREE TEXT BOX
Patient presented with chest pain since last night. Otherwise afebrile, Hd stable, well appearing. EKG obtained and non-ischemic without evidence of STEMI. Obtained labs which were grossly unremarkable including no significant leukocytosis, anemia, signs of dehydration/LARRY, transaminitis or significant electrolyte abnormalities. Trop negative. Chest xray negative for pneumothorax, pneumonia, widened mediastinum, evidence of rib fractures, enlarged cardiac silhouette or any other emergent pathologies. CTA chest negative for PE or any other emergent intrathoracic pathologies. Patient remained without recurrence of chest pain or abnormalities during monitoring in ED. Ambulatory without difficulty, tolerates PO. HEART Score 2, no concern clinically for dissection. Given the above, will discharge home with outpatient follow up. Patient agreeable with plan. Agrees to return to ED for any new or worsening symptoms.

## 2023-05-07 NOTE — ED CDU PROVIDER INITIAL DAY NOTE - CLINICAL SUMMARY MEDICAL DECISION MAKING FREE TEXT BOX
Pt in obs under CP r/o  CEx2, EKGx2, Stress, Tele Pt in obs under CP r/o  CEx2, EKGx2, Stress,  Tele

## 2023-05-07 NOTE — ED PROVIDER NOTE - DIFFERENTIAL DIAGNOSIS
Chest pain r/o ACS vs PE vs dissection vs tamponade vs esophageal rupture vs pneumothorax Differential Diagnosis

## 2023-05-07 NOTE — ED CDU PROVIDER INITIAL DAY NOTE - ATTENDING APP SHARED VISIT CONTRIBUTION OF CARE
63-year-old female with history of hypertension, thyroid CA status post thyroidectomy, breast CA  pt presents for eval of CP.  CP sternal/R sided. sx intermittent since saturday evening. no exertional component. no pleuritic component. no syncope, palpitations, ap, vomiting, diarrhea. no recent illness.     vss  gen- NAD, aaox3  card-rrr  lungs-ctab, no wheezing or rhonchi  abd-sntnd, no guarding or rebound  neuro- full str/sensation, cn ii-xii grossly intact, normal coordination    ED w/u - trop negative, ekg w/o acute ischaemic findings, dimer elevated w/ negative CTA PE

## 2023-05-07 NOTE — ED PROVIDER NOTE - PHYSICAL EXAMINATION
Vital Signs: I have reviewed the initial vital signs.  Constitutional: NAD, well-nourished, appears stated age, no acute distress.  HEENT: Airway patent, moist MM, no erythema/swelling/deformity of oral structures. EOMI, PERRLA.  CV: regular rate, regular rhythm, well-perfused extremities, 2+ b/l DP and radial pulses equal.  Lungs: BCTA, no increased WOB.  ABD: NT, ND, no guarding or rebound, no pulsatile mass, no hernias.   MSK: Neck supple, nontender, nl ROM, no stepoff. Chest nontender. Back nontender. Ext nontender, nl rom, no deformity.   INTEG: s/p left lower extremity graft.  NEURO: A&Ox3, normal strength, nl sensation throughout, normal speech.   PSYCH: Calm, cooperative, normal affect and interaction.

## 2023-05-08 VITALS
HEART RATE: 76 BPM | RESPIRATION RATE: 17 BRPM | SYSTOLIC BLOOD PRESSURE: 133 MMHG | OXYGEN SATURATION: 99 % | DIASTOLIC BLOOD PRESSURE: 65 MMHG

## 2023-05-08 PROCEDURE — 93018 CV STRESS TEST I&R ONLY: CPT

## 2023-05-08 PROCEDURE — G1004: CPT

## 2023-05-08 PROCEDURE — 99238 HOSP IP/OBS DSCHRG MGMT 30/<: CPT

## 2023-05-08 PROCEDURE — 78452 HT MUSCLE IMAGE SPECT MULT: CPT | Mod: 26,ME

## 2023-05-08 PROCEDURE — 93016 CV STRESS TEST SUPVJ ONLY: CPT

## 2023-05-08 RX ADMIN — Medication 1 MILLIGRAM(S): at 13:23

## 2023-05-08 RX ADMIN — Medication 200 MICROGRAM(S): at 05:04

## 2023-05-08 RX ADMIN — Medication 30 MILLIGRAM(S): at 05:04

## 2023-05-08 RX ADMIN — Medication 325 MILLIGRAM(S): at 13:23
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
## 2023-05-08 NOTE — ED CDU PROVIDER DISPOSITION NOTE - CARE PROVIDER_API CALL
follow up with your primary medical doctor,   Phone: (   )    -  Fax: (   )    -  Follow Up Time: 4-6 Days    [employer] [employer])  Cardiology; Internal Medicine; Nuclear Cardiology  [address] [place] [address]  Phone: [phone]  Fax: [phone]  Follow Up Time: 4-6 Days

## 2023-05-08 NOTE — ED CDU PROVIDER SUBSEQUENT DAY NOTE - CLINICAL SUMMARY MEDICAL DECISION MAKING FREE TEXT BOX
Throughout ED observation period, pt remained clinically and hemodynamically stable.  pt feeling well, trops neg, pending stress test

## 2023-05-08 NOTE — ED CDU PROVIDER DISPOSITION NOTE - CLINICAL COURSE
Throughout ED observation period, pt remained clinically and hemodynamically stable.  labs w/o acute findings, trops neg x2  cta negative for acute findings  stress test negative  pt stable for dc w/ OP f/u and return precautions

## 2023-05-08 NOTE — ED CDU PROVIDER DISPOSITION NOTE - PATIENT PORTAL LINK FT
You can access the FollowMyHealth Patient Portal offered by [facility] by registering at the following website: [email or web address]. By joining [facility]’s FollowMyHealth portal, you will also be able to view your health information using other applications (apps) compatible with our system.

## 2023-05-08 NOTE — ED ADULT NURSE REASSESSMENT NOTE - NS ED NURSE REASSESS COMMENT FT1
pt assessed A&Ox3, VSS, pt resting in bed at this time pt remains on cardiac monitor plan of care discussed w/ pt . safety & comfort measures maintained. will continue to monitor

## 2023-05-08 NOTE — ED CDU PROVIDER DISPOSITION NOTE - PROVIDER TOKENS
FREE:[LAST:[follow up with your primary medical doctor],PHONE:[(   )    -],FAX:[(   )    -],FOLLOWUP:[4-6 Days]],PROVIDER:[TOKEN:[[id number]],FOLLOWUP:[4-6 Days]]

## 2023-05-11 ENCOUNTER — APPOINTMENT (OUTPATIENT)
Dept: PLASTIC SURGERY | Facility: CLINIC | Age: 63
End: 2023-05-11
Payer: COMMERCIAL

## 2023-05-11 PROCEDURE — 99024 POSTOP FOLLOW-UP VISIT: CPT

## 2023-05-11 NOTE — PHYSICAL EXAM
[de-identified] : well developed obese female, NAD  [de-identified] : unlabored breathing  [de-identified] : YOVANYR [de-identified] : Left LE - large open wound with adherent and well incorporated skin graft, good overall take, delayed healing with patchy take over areas of fat, nonviable graft and unhealthy fat removed, periwound clean, swelling as expected \par donor site left anterior thigh epithelializing, dry  [de-identified] : Left shoulder incision healed

## 2023-05-11 NOTE — ASSESSMENT
[FreeTextEntry1] : 62 yo F s/p excision of left posterior shoulder dermatofibroma in 2021. \par \par Now with new traumatic left LE wound with epidermolysis and now weeping blisters. \par New calf pain, venous duplex negative for DVT. \par \par Now 1 month s/p STSG to LLE open wound with application of VAC dressing. Doing well. \par \par - minor debridement performed down to viable tissue\par - dressing changed \par - LWC with wet-to-dry BID \par - VNS for wound care, referred sent \par - may get graft wet with soap and water \par - leg rest and elevation\par - limit activities, no gym \par - post-op instructions reviewed and all questions answered \par - f/u next week for graft check \par Seen with Dr. Pratt who agrees with the above plan. \par \par \par \par \par \par \par \par
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
## 2023-05-17 ENCOUNTER — RESULT REVIEW (OUTPATIENT)
Age: 63
End: 2023-05-17

## 2023-05-17 ENCOUNTER — OUTPATIENT (OUTPATIENT)
Dept: OUTPATIENT SERVICES | Facility: HOSPITAL | Age: 63
LOS: 1 days | End: 2023-05-17
Payer: COMMERCIAL

## 2023-05-17 DIAGNOSIS — Z90.49 ACQUIRED ABSENCE OF OTHER SPECIFIED PARTS OF DIGESTIVE TRACT: Chronic | ICD-10-CM

## 2023-05-17 DIAGNOSIS — Z96.652 PRESENCE OF LEFT ARTIFICIAL KNEE JOINT: Chronic | ICD-10-CM

## 2023-05-17 DIAGNOSIS — Z98.890 OTHER SPECIFIED POSTPROCEDURAL STATES: Chronic | ICD-10-CM

## 2023-05-17 DIAGNOSIS — Z90.710 ACQUIRED ABSENCE OF BOTH CERVIX AND UTERUS: Chronic | ICD-10-CM

## 2023-05-17 DIAGNOSIS — Z00.8 ENCOUNTER FOR OTHER GENERAL EXAMINATION: ICD-10-CM

## 2023-05-17 DIAGNOSIS — R92.8 OTHER ABNORMAL AND INCONCLUSIVE FINDINGS ON DIAGNOSTIC IMAGING OF BREAST: ICD-10-CM

## 2023-05-17 PROCEDURE — 77065 DX MAMMO INCL CAD UNI: CPT | Mod: RT

## 2023-05-17 PROCEDURE — 77065 DX MAMMO INCL CAD UNI: CPT | Mod: 26,RT

## 2023-05-17 PROCEDURE — G0279: CPT

## 2023-05-17 PROCEDURE — G0279: CPT | Mod: 26

## 2023-05-18 ENCOUNTER — APPOINTMENT (OUTPATIENT)
Dept: PLASTIC SURGERY | Facility: CLINIC | Age: 63
End: 2023-05-18
Payer: COMMERCIAL

## 2023-05-18 DIAGNOSIS — R92.8 OTHER ABNORMAL AND INCONCLUSIVE FINDINGS ON DIAGNOSTIC IMAGING OF BREAST: ICD-10-CM

## 2023-05-18 PROCEDURE — 99024 POSTOP FOLLOW-UP VISIT: CPT

## 2023-05-18 NOTE — PHYSICAL EXAM
[de-identified] : well developed obese female, NAD  [de-identified] : unlabored breathing  [de-identified] : YOVANYR [de-identified] : Left LE - large open wound with adherent and well incorporated skin graft, good overall take, delayed healing with patchy take over areas of fat, nonviable adipose tissue over superior and inferior wound aspect now debrided down to viable bleeding tissue, periwound clean, swelling as expected \par donor site left anterior thigh epithelializing, dry  [de-identified] : Left shoulder incision healed
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
## 2023-05-19 NOTE — ASSESSMENT
Follow-up with primary care.    Return to the ER with any new or worsening symptoms.   [FreeTextEntry1] : 62 yo F s/p excision of left posterior shoulder dermatofibroma in 2021. \par \par Now with new traumatic left LE wound with epidermolysis and now weeping blisters. \par New calf pain, venous duplex negative for DVT. \par \par - dressing changed and compression ace wrap applied \par - LWC with Xeroform/kerlix/ace wrap by VNS\par - discussed the importance of leg elevation at all times, preferably above the level of the heart \par - all questions answered \par - scheduled for STSG 4/12\par \par \par 3/23/2023\par as above\par left left extr wound comtinues to slowly demarcate\par will need stsg\par OR date 4/12/2023 appropriate\par cont local care\par f/u in 10 days\par all ?s answered\par \par Due to COVID 19, pre-visit patient instructions were explained to the patient and their symptoms were checked upon arrival.  \par Masks were used by the health care providers and staff and the examination room was cleaned after the patient visit was completed.\par \par \par \par \par \par

## 2023-05-24 ENCOUNTER — APPOINTMENT (OUTPATIENT)
Dept: BREAST CENTER | Facility: CLINIC | Age: 63
End: 2023-05-24
Payer: COMMERCIAL

## 2023-05-24 VITALS
HEIGHT: 64 IN | DIASTOLIC BLOOD PRESSURE: 94 MMHG | WEIGHT: 272 LBS | BODY MASS INDEX: 46.44 KG/M2 | SYSTOLIC BLOOD PRESSURE: 147 MMHG

## 2023-05-24 PROCEDURE — 99213 OFFICE O/P EST LOW 20 MIN: CPT
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
## 2023-05-24 NOTE — PHYSICAL EXAM
[Normocephalic] : normocephalic [Atraumatic] : atraumatic [No Supraclavicular Adenopathy] : no supraclavicular adenopathy [Examined in the supine and seated position] : examined in the supine and seated position [No dominant masses] : no dominant masses in right breast  [No dominant masses] : no dominant masses left breast [No Nipple Discharge] : no left nipple discharge [No Edema] : no edema [No Rashes] : no rashes [No Ulceration] : no ulceration [Breast Nipple Inversion] : nipples not inverted [Breast Nipple Retraction] : nipples not retracted [de-identified] : well healed surgical scar.\par palpable scar tissue.\par (+) RT changes.  [de-identified] : No axillary lymphadenopathy appreciated. [de-identified] : No axillary lymphadenopathy appreciated.

## 2023-05-24 NOTE — REVIEW OF SYSTEMS
[Negative] : Constitutional [Limb Pain] : limb pain [As Noted in HPI] : as noted in HPI [Breast Pain] : breast pain [Breast Lump] : no breast lump [Nipple Discharge] : no nipple discharge [Nipple Inverted] : no inversion of the nipple
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
## 2023-05-24 NOTE — DATA REVIEWED
[FreeTextEntry1] : Right Uni Dx Mammo - 05/17/2023:\par BREAST COMPOSITION: There are scattered areas of fibroglandular density.\par \par TECHNIQUE: 2D/tomosynthesis CC/MLO views of the right breast. Computer-aided detection was utilized by the radiologists in the interpretation of this examination.\par \par \par FINDINGS:\par \par Focal asymmetry in the upper outer quadrant of the right breast is likely vascular in etiology. This is stable since 11/16/2022 and likely benign. Continued follow-up is recommended to demonstrate stability.\par \par Other stable benign asymmetries are seen in the right breast. No suspicious masses, calcifications, or areas of architectural distortion are identified in the right breast. There has been no significant interval change from the prior studies.\par \par \par IMPRESSION:\par \par Probably benign focal asymmetry in the right breast as described above.\par \par Recommendation: Follow-up bilateral diagnostic mammogram in 6 months.\par \par \par BI-RADS category 3: Probably Benign

## 2023-05-25 ENCOUNTER — APPOINTMENT (OUTPATIENT)
Dept: PLASTIC SURGERY | Facility: CLINIC | Age: 63
End: 2023-05-25
Payer: COMMERCIAL

## 2023-05-25 PROCEDURE — 99024 POSTOP FOLLOW-UP VISIT: CPT

## 2023-05-25 NOTE — ASSESSMENT
[FreeTextEntry1] : 62 yo F s/p excision of left posterior shoulder dermatofibroma in 2021. \par \par New traumatic left LE wound with epidermolysis and weeping blisters, resolved. \par New calf pain, venous duplex negative for DVT. \par \par Now 5 weeks s/p STSG to LLE open wound with application of VAC dressing. Delayed healing. \par \par - soft tissue debridement of nonviable fat performed today\par - dressing changed \par - continue LWC with wet-to-dry BID per VNS\par - may get graft wet with soap and water \par - leg rest and elevation\par - limit activities, no gym \par - post-op instructions reviewed and all questions answered \par - f/u next week for wound check \par \par \par 5/25/2023\par LLE fine\par cont wet tto dry BID\par will slowly heal--likely to take 6-8 wks (perhaps longer)\par \par all ?s asnwered\par \par f/u 2-3 wks\par \par \par \par \par \par
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
## 2023-05-25 NOTE — PHYSICAL EXAM
[de-identified] : well developed obese female, NAD  [de-identified] : unlabored breathing  [de-identified] : YOVANYR [de-identified] : Left LE - large open wound with adherent and well incorporated skin graft, good overall take, delayed healing with patchy take over areas of fat, nonviable adipose tissue over superior and inferior wound aspect now debrided down to viable bleeding tissue, periwound clean, swelling as expected \par donor site left anterior thigh epithelializing, dry  [de-identified] : Left shoulder incision healed

## 2023-06-05 ENCOUNTER — APPOINTMENT (OUTPATIENT)
Dept: PLASTIC SURGERY | Facility: CLINIC | Age: 63
End: 2023-06-05
Payer: COMMERCIAL

## 2023-06-05 PROCEDURE — 99024 POSTOP FOLLOW-UP VISIT: CPT

## 2023-06-05 NOTE — PHYSICAL EXAM
[de-identified] : well developed obese female, NAD  [de-identified] : unlabored breathing  [de-identified] : YOVANYR [de-identified] : Left LE - large open wound with adherent and well incorporated skin graft, good overall take, delayed healing with patchy take over areas of fat, all areas with viable bleeding tissue, granulation tissue present, periwound clean, swelling as expected \par donor site left anterior thigh epithelializing, dry  [de-identified] : Left shoulder incision healed
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
## 2023-06-05 NOTE — ASSESSMENT
[FreeTextEntry1] : 64 yo F s/p excision of left posterior shoulder dermatofibroma in 2021. \par \par New traumatic left LE wound with epidermolysis and weeping blisters, resolved. \par New calf pain, venous duplex negative for DVT. \par \par Now 8 weeks s/p STSG to LLE open wound with application of VAC dressing. Delayed healing. \par \par - dressing changed \par - continue LWC with wet-to-dry BID per VNS\par - may get graft wet with soap and water \par - leg rest and elevation\par - s/s of infection reviewed\par - limit activities, no gym \par - post-op instructions reviewed and all questions answered \par - Keep f/u as scheduled\par \par \par \par \par \par \par

## 2023-06-06 ENCOUNTER — OUTPATIENT (OUTPATIENT)
Dept: OUTPATIENT SERVICES | Facility: HOSPITAL | Age: 63
LOS: 1 days | End: 2023-06-06
Payer: COMMERCIAL

## 2023-06-06 ENCOUNTER — APPOINTMENT (OUTPATIENT)
Dept: RADIATION ONCOLOGY | Facility: HOSPITAL | Age: 63
End: 2023-06-06

## 2023-06-06 ENCOUNTER — APPOINTMENT (OUTPATIENT)
Dept: RADIATION ONCOLOGY | Facility: HOSPITAL | Age: 63
End: 2023-06-06
Payer: COMMERCIAL

## 2023-06-06 VITALS
SYSTOLIC BLOOD PRESSURE: 116 MMHG | TEMPERATURE: 97.2 F | WEIGHT: 269.13 LBS | DIASTOLIC BLOOD PRESSURE: 71 MMHG | RESPIRATION RATE: 66 BRPM | HEART RATE: 16 BPM | BODY MASS INDEX: 46.2 KG/M2 | OXYGEN SATURATION: 100 %

## 2023-06-06 DIAGNOSIS — Z90.49 ACQUIRED ABSENCE OF OTHER SPECIFIED PARTS OF DIGESTIVE TRACT: Chronic | ICD-10-CM

## 2023-06-06 DIAGNOSIS — D05.12 INTRADUCTAL CARCINOMA IN SITU OF LEFT BREAST: ICD-10-CM

## 2023-06-06 DIAGNOSIS — Z96.652 PRESENCE OF LEFT ARTIFICIAL KNEE JOINT: Chronic | ICD-10-CM

## 2023-06-06 DIAGNOSIS — Z98.890 OTHER SPECIFIED POSTPROCEDURAL STATES: Chronic | ICD-10-CM

## 2023-06-06 DIAGNOSIS — Z90.710 ACQUIRED ABSENCE OF BOTH CERVIX AND UTERUS: Chronic | ICD-10-CM

## 2023-06-06 PROCEDURE — 99213 OFFICE O/P EST LOW 20 MIN: CPT

## 2023-06-06 PROCEDURE — 99213 OFFICE O/P EST LOW 20 MIN: CPT | Mod: TC

## 2023-06-06 RX ORDER — TRAMADOL HYDROCHLORIDE 50 MG/1
50 TABLET, COATED ORAL
Qty: 8 | Refills: 0 | Status: DISCONTINUED | COMMUNITY
Start: 2023-04-12 | End: 2023-06-06

## 2023-06-06 RX ORDER — HYLAN G-F 20 16MG/2ML
48 SYRINGE (ML) INTRAARTICULAR
Qty: 1 | Refills: 0 | Status: DISCONTINUED | OUTPATIENT
Start: 2022-02-15 | End: 2023-06-06

## 2023-06-06 RX ORDER — AMOXICILLIN 500 MG/1
500 CAPSULE ORAL
Qty: 20 | Refills: 0 | Status: DISCONTINUED | COMMUNITY
Start: 2022-05-14 | End: 2023-06-06

## 2023-06-06 RX ORDER — BISMUTH TRIBROMOPH/PETROLATUM 5"X9"
BANDAGE TOPICAL
Qty: 1 | Refills: 1 | Status: DISCONTINUED | COMMUNITY
Start: 2023-03-01 | End: 2023-06-06

## 2023-06-06 RX ORDER — GABAPENTIN 300 MG/1
300 CAPSULE ORAL
Qty: 7 | Refills: 0 | Status: DISCONTINUED | COMMUNITY
Start: 2023-04-05 | End: 2023-06-06

## 2023-06-06 RX ORDER — APIXABAN 5 MG/1
5 TABLET, FILM COATED ORAL
Qty: 30 | Refills: 0 | Status: DISCONTINUED | COMMUNITY
Start: 2022-05-04 | End: 2023-06-06

## 2023-06-06 RX ORDER — DOXYCYCLINE HYCLATE 100 MG/1
100 CAPSULE ORAL
Qty: 14 | Refills: 0 | Status: DISCONTINUED | COMMUNITY
Start: 2023-04-12 | End: 2023-06-06

## 2023-06-06 RX ORDER — CEPHALEXIN 500 MG/1
500 CAPSULE ORAL 4 TIMES DAILY
Qty: 28 | Refills: 0 | Status: DISCONTINUED | COMMUNITY
Start: 2023-02-28 | End: 2023-06-06

## 2023-06-06 NOTE — HISTORY OF PRESENT ILLNESS
[FreeTextEntry1] :  \par CORY RECIO  returns to clinic in follow up visit.  As you know, CORY RECIO is a 63 year old female with Intermediate grade DCIS of the left breast, ER/MI positive, [id number], Stage 0. She is s/p breast conserving surgery.  She received 5240cGy to the left breast from 3/16/2020 through 4/6/2020 without any complications.  I last saw her in June 2022. She continues to take anastrozole with complaints of joint pain, not worsening.   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  On 2/22/2023, s/p left LE trauma, s/p skin graft with Dr. Pratt on 4/12/2023.  She is in the process of wound healing.  She denies breast pain and has no breast concerns.   Otherwise, she reports doing well.  \par \par Right breast diagnostic mammogram/ultrasound on 5/17/2023- short interval follow-up for probably benign focal asymmetry in the right breast, first identified on 11/16/2022 showed focal asymmetry in the upper outer quadrant of the right breast is likely vascular in etiology. This is stable since 11/16/2022 and likely benign. Continued follow-up is recommended to demonstrate stability.\par Recommendation: Follow-up bilateral diagnostic mammogram in 6 months.   BI-RADS category 3: Probably Benign\par \par On 11/16/2022 B/L Dx Mammo & US shows, stable postoperative changes of the left breast are benign.   Probably benign nodular asymmetry in the right breast.   Recommendation: Follow-up right mammogram in 6 months. BI-RADS category 3: Probably Benign\par \par Dr. Joseph 6/22/2023\par Upcoming b/l mammo pending in Nov. 2023

## 2023-06-06 NOTE — PHYSICAL EXAM
[Sclera] : the sclera and conjunctiva were normal [Hearing Threshold Finger Rub Not Leslie] : hearing was normal [Heart Rate And Rhythm] : heart rate and rhythm were normal [Heart Sounds] : normal S1 and S2 [Murmurs] : no murmurs present [No Discharge] : no discharge [No Masses] : no breast masses were palpable [Cervical Lymph Nodes Enlarged Posterior Bilaterally] : posterior cervical [Cervical Lymph Nodes Enlarged Anterior Bilaterally] : anterior cervical [Supraclavicular Lymph Nodes Enlarged Bilaterally] : supraclavicular [Nail Clubbing] : no clubbing  or cyanosis of the fingernails [Skin Color & Pigmentation] : normal skin color and pigmentation [No Focal Deficits] : no focal deficits [Motor Exam] : the motor exam was normal [Tenderness Of The Right Breast] : no tenderness [Enlargement Of The Right Breast] : no swelling [___] :  no erythema [Enlargement Of The Left Breast] : no swelling [Tenderness Of The Left Breast] : no tenderness [Axillary Lymph Nodes Enlarged Bilaterally] : no enlarged nodes [Normal] : no palpable adenopathy

## 2023-06-06 NOTE — DISEASE MANAGEMENT
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   [TTNM] : is [NTNM] : 0 [MTNM] : 0

## 2023-06-06 NOTE — LETTER CLOSING
[Consult Closing:] : Thank you for allowing me to participate in the care of this patient.  If you have any questions, please do not hesitate to contact me. [Sincerely yours,] : Sincerely yours, [FreeTextEntry3] : Meliza Oswald M.D. \par \par Electronically proofread and signed by:  Meliza Oswald MD\par Attending, Department of Radiation Medicine\par [facility]\par \par CC: Dr. Joseph

## 2023-06-06 NOTE — VITALS
[Maximal Pain Intensity: 1/10] : 1/10 [Pain Location: ___] : Pain Location: [unfilled] [OTC] : OTC [80: Normal activity with effort; some signs or symptoms of disease.] : 80: Normal activity with effort; some signs or symptoms of disease.

## 2023-06-06 NOTE — REVIEW OF SYSTEMS
Family [Lower Ext Edema] : lower extremity edema [Joint Pain] : joint pain [Muscle Pain] : muscle pain [Skin Wound] : skin wound [Negative] : Psychiatric [Fever] : no fever [Chills] : no chills [Fatigue] : no fatigue [Chest Pain] : no chest pain [Palpitations] : no palpitations [Shortness Of Breath] : no shortness of breath [FreeTextEntry9] : uses cane  [de-identified] : left LE

## 2023-06-07 DIAGNOSIS — D05.12 INTRADUCTAL CARCINOMA IN SITU OF LEFT BREAST: ICD-10-CM

## 2023-06-15 ENCOUNTER — APPOINTMENT (OUTPATIENT)
Dept: PLASTIC SURGERY | Facility: CLINIC | Age: 63
End: 2023-06-15
Payer: COMMERCIAL

## 2023-06-15 PROCEDURE — 99024 POSTOP FOLLOW-UP VISIT: CPT

## 2023-06-15 NOTE — PHYSICAL EXAM
[de-identified] : well developed obese female, NAD  [de-identified] : unlabored breathing  [de-identified] : YOVANYR [de-identified] : Left LE - large open wound with adherent and maturing skin graft, good overall take, delayed healing with patchy take over superior most areas responding to wound care, healthy granulation tissue present, periwound clean, swelling as expected \par donor site left anterior thigh epithelializing, dry  [de-identified] : Left shoulder incision healed

## 2023-06-15 NOTE — ASSESSMENT
[FreeTextEntry1] : 64 yo F s/p excision of left posterior shoulder dermatofibroma in 2021. \par \par New traumatic left LE wound with epidermolysis and weeping blisters, resolved. \par New calf pain, venous duplex negative for DVT. \par \par Now 9 weeks s/p STSG to LLE open wound with application of VAC dressing. Delayed healing responding to local wound care. \par \par - dressing changed \par - continue LWC with Aquacel per VNS and wet-to-dry remaining days \par - may get graft wet with soap and water \par - leg rest and elevation\par - s/s of infection reviewed\par - limit activities, no gym \par - post-op instructions reviewed and all questions answered \par - f/u 2 weeks for wound check \par \par \par \par \par \par \par
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
## 2023-06-22 ENCOUNTER — OUTPATIENT (OUTPATIENT)
Dept: OUTPATIENT SERVICES | Facility: HOSPITAL | Age: 63
LOS: 1 days | End: 2023-06-22
Payer: COMMERCIAL

## 2023-06-22 ENCOUNTER — APPOINTMENT (OUTPATIENT)
Dept: HEMATOLOGY ONCOLOGY | Facility: CLINIC | Age: 63
End: 2023-06-22
Payer: COMMERCIAL

## 2023-06-22 ENCOUNTER — APPOINTMENT (OUTPATIENT)
Dept: HEMATOLOGY ONCOLOGY | Facility: CLINIC | Age: 63
End: 2023-06-22

## 2023-06-22 VITALS
WEIGHT: 270 LBS | BODY MASS INDEX: 46.1 KG/M2 | HEART RATE: 80 BPM | SYSTOLIC BLOOD PRESSURE: 150 MMHG | HEIGHT: 64 IN | DIASTOLIC BLOOD PRESSURE: 93 MMHG | TEMPERATURE: 97.6 F

## 2023-06-22 DIAGNOSIS — Z90.49 ACQUIRED ABSENCE OF OTHER SPECIFIED PARTS OF DIGESTIVE TRACT: Chronic | ICD-10-CM

## 2023-06-22 DIAGNOSIS — Z79.811 ENCOUNTER FOR THERAPEUTIC DRUG LVL MONITORING: ICD-10-CM

## 2023-06-22 DIAGNOSIS — D48.5 NEOPLASM OF UNCERTAIN BEHAVIOR OF SKIN: ICD-10-CM

## 2023-06-22 DIAGNOSIS — Z98.890 OTHER SPECIFIED POSTPROCEDURAL STATES: Chronic | ICD-10-CM

## 2023-06-22 DIAGNOSIS — Z51.81 ENCOUNTER FOR THERAPEUTIC DRUG LVL MONITORING: ICD-10-CM

## 2023-06-22 DIAGNOSIS — Z96.652 PRESENCE OF LEFT ARTIFICIAL KNEE JOINT: Chronic | ICD-10-CM

## 2023-06-22 DIAGNOSIS — Z90.710 ACQUIRED ABSENCE OF BOTH CERVIX AND UTERUS: Chronic | ICD-10-CM

## 2023-06-22 PROCEDURE — 99214 OFFICE O/P EST MOD 30 MIN: CPT
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
## 2023-06-22 NOTE — ASSESSMENT
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 Anticardiolipin IgM mildly elevated 20.4. Will repeat in 3 months.\par \par #  Left breast DCIS, intermediate nuclear grade, ER/PA positive, s/p lumpectomy with negative margins. \par -- Continue Anastrozole daily.\par -- Breast exam did not reveal palpable abnormality. Right breast dx mammo in 5/2023 showed probably benign focal asymmetry in the right breast. Follow-up bilateral diagnostic mammogram in 6 months was recommended. \par -- She was given a referral for repeat bone density. Continue calcium and vitamin D supplement and physical activity.  \par -- Followup with Dr. Pratt for wound care. \par -- Followup with breast surgeon and Dr. Oswald as indicated.\par -- RTO for followup in 6 months.\par \par \par

## 2023-06-22 NOTE — PHYSICAL EXAM
[Fully active, able to carry on all pre-disease performance without restriction] : Status 0 - Fully active, able to carry on all pre-disease performance without restriction [Normal] : affect appropriate [de-identified] : Status post left breast lumpectomy. Surgical scar is healing well. There is no palpable abnormality. [de-identified] : left low leg wound.

## 2023-06-23 DIAGNOSIS — D48.5 NEOPLASM OF UNCERTAIN BEHAVIOR OF SKIN: ICD-10-CM

## 2023-06-23 DIAGNOSIS — Z86.718 PERSONAL HISTORY OF OTHER VENOUS THROMBOSIS AND EMBOLISM: ICD-10-CM

## 2023-06-23 DIAGNOSIS — Z51.81 ENCOUNTER FOR THERAPEUTIC DRUG LEVEL MONITORING: ICD-10-CM

## 2023-06-23 DIAGNOSIS — S81.802A UNSPECIFIED OPEN WOUND, LEFT LOWER LEG, INITIAL ENCOUNTER: ICD-10-CM

## 2023-06-29 ENCOUNTER — APPOINTMENT (OUTPATIENT)
Dept: PLASTIC SURGERY | Facility: CLINIC | Age: 63
End: 2023-06-29
Payer: COMMERCIAL

## 2023-06-29 PROCEDURE — 99024 POSTOP FOLLOW-UP VISIT: CPT

## 2023-06-29 NOTE — ASSESSMENT
[FreeTextEntry1] : 64 yo F s/p excision of left posterior shoulder dermatofibroma in 2021. \par \par New traumatic left LE wound with epidermolysis and weeping blisters, resolved. \par New calf pain, venous duplex negative for DVT. \par \par Now 9 weeks s/p STSG to LLE open wound with application of VAC dressing. Delayed healing responding to local wound care. \par \par - dressing changed \par - continue LWC with Aquacel per VNS and wet-to-dry remaining days \par - may get graft wet with soap and water \par - leg rest and elevation\par - s/s of infection reviewed\par - limit activities, no gym \par - post-op instructions reviewed and all questions answered \par - f/u 2 weeks for wound check \par \par 6/29/2023\par as above\par left calf wound continues to slowly heal--well over 90% healed w skin graft and local care\par cont aquafor or colloid dressing VNS recommended\par compression stocking recommended when wound healed\par cont current local wound care\par \par no restrictions w activity\par may go in pool/ocean\par \par f/u 2 months\par \par starting new job July 17 at [employer]\par \par \par \par \par \par
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
## 2023-06-29 NOTE — PHYSICAL EXAM
[de-identified] : well developed obese female, NAD  [de-identified] : unlabored breathing  [de-identified] : YOVANYR [de-identified] : Left LE - large open wound with adherent and maturing skin graft, good overall take, delayed healing with patchy take over superior most areas responding to wound care, healthy granulation tissue present, periwound clean, swelling as expected \par donor site left anterior thigh epithelializing, dry  [de-identified] : Left shoulder incision healed

## 2023-07-11 ENCOUNTER — APPOINTMENT (OUTPATIENT)
Dept: VASCULAR SURGERY | Facility: CLINIC | Age: 63
End: 2023-07-11
Payer: COMMERCIAL

## 2023-07-11 VITALS — HEIGHT: 64 IN | BODY MASS INDEX: 46.1 KG/M2 | WEIGHT: 270 LBS

## 2023-07-11 PROCEDURE — 93970 EXTREMITY STUDY: CPT

## 2023-07-11 PROCEDURE — 99212 OFFICE O/P EST SF 10 MIN: CPT

## 2023-07-11 NOTE — ASSESSMENT
[FreeTextEntry1] : 62 y/o female with h/o  right calf DVT in May 2022, was on Eliquis for 3 months, has been off anticoagulation.\par \par Duplex today showed no evidence of DVT in the lower extremities bilaterally.\par \par I made a referral for lymphapress pumps.\par \par Follow up in one year.
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
## 2023-07-13 NOTE — ED ADULT NURSE NOTE - CAS DISCH ACCOMP BY
Norgestim-Eth Estrad Triphasic (TRI-LO-SPRINTEC) FARZANEH Claudio [facility]
Spouse

## 2023-07-24 ENCOUNTER — APPOINTMENT (OUTPATIENT)
Dept: PLASTIC SURGERY | Facility: CLINIC | Age: 63
End: 2023-07-24
Payer: COMMERCIAL

## 2023-07-24 PROCEDURE — 99213 OFFICE O/P EST LOW 20 MIN: CPT

## 2023-07-24 NOTE — ASSESSMENT
[FreeTextEntry1] : 62 yo F s/p excision of left posterior shoulder dermatofibroma in 2021. \par \par New traumatic left LE wound with epidermolysis and weeping blisters, resolved. \par New calf pain, venous duplex negative for DVT. \par \par Now 3 months s/p STSG to LLE open wound with application of VAC dressing. Delayed healing responding to local wound care with almost complete wound closure. \par \par - Pt reassured - swelling likely due to activity changes associated with new job\par - Rx Doxy x 7 days prophylactically \par - leg rest and elevation as much as possible \par - continue LWC with Aquacel and Aquaphor \par - all questions answered \par - f/u 2 weeks for wound check \par \par \par \par \par \par \par
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
## 2023-07-24 NOTE — PHYSICAL EXAM
[de-identified] : well developed obese female, NAD  [de-identified] : unlabored breathing  [de-identified] : YOVANYR [de-identified] : Left LE - large open wound with adherent and maturing skin graft, good overall take, delayed healing with patchy take over superior most areas responding to wound care, almost completely epithelialized, periwound clean, swelling and slight erythema present, no calf tenderness \par donor site left anterior thigh epithelializing, dry  [de-identified] : Left shoulder incision healed

## 2023-08-04 ENCOUNTER — APPOINTMENT (OUTPATIENT)
Dept: PLASTIC SURGERY | Facility: CLINIC | Age: 63
End: 2023-08-04
Payer: COMMERCIAL

## 2023-08-04 PROCEDURE — 99212 OFFICE O/P EST SF 10 MIN: CPT
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
## 2023-08-04 NOTE — ASSESSMENT
[FreeTextEntry1] : 64 yo F s/p excision of left posterior shoulder dermatofibroma in 2021.   New traumatic left LE wound with epidermolysis and weeping blisters, resolved.  New calf pain, venous duplex negative for DVT.   Now 3 months s/p STSG to LLE open wound with application of VAC dressing. Delayed healing responding to local wound care with almost complete wound closure.   - Pt reassured - swelling likely due to activity changes associated with new job - Rx Doxy x 7 days prophylactically  - leg rest and elevation as much as possible  - continue LWC with Aquacel and Aquaphor  - all questions answered  - f/u 2 weeks for wound check   8/4/2023 LLE wound 99% closed--scattered areas to be treated w aquafor treated for cellulitis with doxy 100 BID start doxy 150 BID w renewal f/u when return from trip

## 2023-08-04 NOTE — PHYSICAL EXAM
[de-identified] : well developed obese female, NAD  [de-identified] : YOVANYR [de-identified] : unlabored breathing  [de-identified] : Left LE - large open wound with adherent and maturing skin graft, good overall take, delayed healing with patchy take over superior most areas responding to wound care, almost completely epithelialized, periwound clean, swelling and slight erythema present, no calf tenderness \par  donor site left anterior thigh epithelializing, dry  [de-identified] : Left shoulder incision healed

## 2023-08-14 ENCOUNTER — APPOINTMENT (OUTPATIENT)
Dept: PLASTIC SURGERY | Facility: CLINIC | Age: 63
End: 2023-08-14
Payer: COMMERCIAL

## 2023-08-14 PROCEDURE — 99212 OFFICE O/P EST SF 10 MIN: CPT
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
## 2023-08-14 NOTE — ASSESSMENT
[FreeTextEntry1] : 62 yo F s/p excision of left posterior shoulder dermatofibroma in 2021.   New traumatic left LE wound with epidermolysis and weeping blisters, resolved.  New calf pain, venous duplex negative for DVT.   Now 4 months s/p STSG to LLE open wound with application of VAC dressing. Delayed healing responding to local wound care now with complete wound closure.   - resume BL compression stockings   - recommend Vascular f/u with Dr. Boland  - leg rest and elevation as much as possible  - continue LWC with Aquaphor  - all questions answered  - f/u 1 month with Dr. Pratt as previously scheduled

## 2023-08-14 NOTE — PHYSICAL EXAM
[de-identified] : well developed obese female, NAD  [de-identified] : unlabored breathing  [de-identified] : YOVANYR [de-identified] : Left LE - large open wound with mature skin graft, all wounds completely epithelialized, periwound clean, no erythema, bilateral LE 2+ pitting edema, no calf tenderness  donor site left anterior thigh epithelializing, dry  [de-identified] : Left shoulder incision healed

## 2023-08-24 NOTE — BRIEF OPERATIVE NOTE - NSICDXBRIEFPOSTOP_GEN_ALL_CORE_FT
Well controlled on Lasix, amlodipine, lisinopril, metoprolol.  Managed by cardiology.  Patient is asymptomatic.  Diet and exercise per above.  - /78  - Any symptoms of hyper or hypotension? No  - CKD diagnosed: Yes; Last GFR was 60  - Congestive Heart Failure Diagnosed: Yes , Last EF 68   - Plan: encouraged healthy diet and exercise, continue Lasix 40 mg daily, amlodipine 10 mg daily, lisinopril 40 mg daily, metoprolol 100 mg daily, as per Cardiology.     POST-OP DIAGNOSIS:  Ductal carcinoma in situ of left breast 27-Jan-2020 13:41:00  Teresa Charles

## 2023-09-01 ENCOUNTER — APPOINTMENT (OUTPATIENT)
Dept: PLASTIC SURGERY | Facility: CLINIC | Age: 63
End: 2023-09-01
Payer: COMMERCIAL

## 2023-09-01 PROCEDURE — 99212 OFFICE O/P EST SF 10 MIN: CPT
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
## 2023-09-01 NOTE — DATA REVIEWED
[FreeTextEntry1] : B/L Dx Mammo & Sono - 12/14/2021:\par Breast composition:The breasts are heterogeneously dense, which may obscure small masses.\par \par Mammography:\par The patient is status post a left lumpectomy with stable architectural distortion most consistent with postsurgical change.  No suspicious masses or abnormal clusters of microcalcifications are seen.\par \par Ultrasound:\par Bilateral whole breast ultrasound was performed.\par \par Left breast, at the eight to 9:00 position 10 to 11 cm from the nipple, 1.3 x 1.1 x 0.4 cm ovoid well-circumscribed hypoechoic mass. This specific location was not imaged on the prior sonographic examination on 12/20/2019 and so this finding is presumed to be new.\par \par Additionally, at the one to 2:00 position 8 cm from the nipple, 1.9 x 2.1 x 1.1 cm well-defined hyperechoic mass compatible with fat necrosis. This is an expected finding at the lumpectomy site.\par \par No suspicious sonographic findings within the right breast.\par \par No axillary lymphadenopathy bilaterally\par \par \par Impression:\par \par 1.3 x 1.1 x 0.4 cm hypoechoic mass in the medial left breast, probably benign.\par \par Post left lumpectomy and radiation therapy with expected posttreatment changes in. No abnormal clusters of microcalcifications\par \par Recommendation: Follow-up unilateral diagnostic mammogram and left ultrasound in 6 months.\par \par BI-RADS category 3: Probably Benign\par \par 
[id number]

## 2023-09-01 NOTE — ASSESSMENT
[FreeTextEntry1] : 64 yo F s/p excision of left posterior shoulder dermatofibroma in 2021.   New traumatic left LE wound with epidermolysis and weeping blisters, resolved.  New calf pain, venous duplex negative for DVT.   Now 4 months s/p STSG to LLE open wound with application of VAC dressing. Delayed healing responding to local wound care now with complete wound closure.   - resume BL compression stockings   - recommend Vascular f/u with Dr. Boland  - leg rest and elevation as much as possible  - continue LWC with Aquaphor  - all questions answered  - f/u 1 month with Dr. Pratt as previously scheduled   9/1/2023 as above left calf essentially healed very mild redness--very early cellulitis oral abx course  pt to contact vascular surgery to see if can increase use of  Lymphapress once very mild cellulitis improves  Pt to contact re [facility] for weight loss options--?Ozempic  rediscussed bariatric evaluation  Contact info given for Dr Jackson  f/u 2 wks

## 2023-09-01 NOTE — PHYSICAL EXAM
[de-identified] : well developed obese female, NAD  [de-identified] : unlabored breathing  [de-identified] : YOVANYR [de-identified] : Left LE - large open wound with mature skin graft, all wounds completely epithelialized, periwound clean, no erythema, bilateral LE 2+ pitting edema, no calf tenderness  donor site left anterior thigh epithelializing, dry  [de-identified] : Left shoulder incision healed

## 2023-09-08 ENCOUNTER — APPOINTMENT (OUTPATIENT)
Dept: PLASTIC SURGERY | Facility: CLINIC | Age: 63
End: 2023-09-08
Payer: COMMERCIAL

## 2023-09-08 PROCEDURE — 99213 OFFICE O/P EST LOW 20 MIN: CPT
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
## 2023-09-08 NOTE — PHYSICAL EXAM
[de-identified] : well developed obese female, NAD  [de-identified] : unlabored breathing  [de-identified] : YOVANYR [de-identified] : Left LE - large open wound with mature skin graft, all wounds completely epithelialized and extremely dry with large flaking skin, skin graft and surrounding wound with erythema, unchanged since previous visits, LLE pitting edema worse, no calf tenderness. L TKR incision well healed [de-identified] : Left shoulder incision healed

## 2023-09-08 NOTE — ASSESSMENT
[FreeTextEntry1] : 64 yo F s/p excision of left posterior shoulder dermatofibroma in 2021.   New traumatic left LE wound with epidermolysis and weeping blisters, resolved.  New calf pain, venous duplex negative for DVT.   Now 5 months s/p STSG to LLE open wound, now with complete wound closure Koki-wound erythema, significant LLE swelling  - resume compression stockings   - Recommended f/u with [facility] specialist  - f/u with Dr. Boland, discuss increasing use of Lymphapress.  - leg elevation as much as possible  - Recommended f/u with bariatric MD again to discuss wt loss options  - Start moisturizer to entire area multiple tiems a day  - s/s infection reviewed, complete course of abx - all questions answered  - next week as scheduled

## 2023-09-15 ENCOUNTER — APPOINTMENT (OUTPATIENT)
Dept: PLASTIC SURGERY | Facility: CLINIC | Age: 63
End: 2023-09-15
Payer: COMMERCIAL

## 2023-09-15 DIAGNOSIS — S81.802A UNSPECIFIED OPEN WOUND, LEFT LOWER LEG, INITIAL ENCOUNTER: ICD-10-CM

## 2023-09-15 PROCEDURE — 99213 OFFICE O/P EST LOW 20 MIN: CPT

## 2023-09-19 ENCOUNTER — APPOINTMENT (OUTPATIENT)
Dept: VASCULAR SURGERY | Facility: CLINIC | Age: 63
End: 2023-09-19
Payer: COMMERCIAL

## 2023-09-19 DIAGNOSIS — S81.802A UNSPECIFIED OPEN WOUND, LEFT LOWER LEG, INITIAL ENCOUNTER: ICD-10-CM

## 2023-09-19 PROCEDURE — 29580 STRAPPING UNNA BOOT: CPT | Mod: LT

## 2023-09-29 ENCOUNTER — APPOINTMENT (OUTPATIENT)
Dept: VASCULAR SURGERY | Facility: CLINIC | Age: 63
End: 2023-09-29
Payer: COMMERCIAL

## 2023-09-29 PROCEDURE — 29580 STRAPPING UNNA BOOT: CPT | Mod: LT

## 2023-10-06 ENCOUNTER — APPOINTMENT (OUTPATIENT)
Dept: VASCULAR SURGERY | Facility: CLINIC | Age: 63
End: 2023-10-06
Payer: COMMERCIAL

## 2023-10-06 PROCEDURE — 99212 OFFICE O/P EST SF 10 MIN: CPT

## 2023-10-09 ENCOUNTER — APPOINTMENT (OUTPATIENT)
Dept: OBGYN | Facility: CLINIC | Age: 63
End: 2023-10-09

## 2023-10-09 ENCOUNTER — RESULT REVIEW (OUTPATIENT)
Age: 63
End: 2023-10-09

## 2023-10-09 ENCOUNTER — OUTPATIENT (OUTPATIENT)
Dept: OUTPATIENT SERVICES | Facility: HOSPITAL | Age: 63
LOS: 1 days | End: 2023-10-09
Payer: COMMERCIAL

## 2023-10-09 DIAGNOSIS — Z96.652 PRESENCE OF LEFT ARTIFICIAL KNEE JOINT: Chronic | ICD-10-CM

## 2023-10-09 DIAGNOSIS — Z98.890 OTHER SPECIFIED POSTPROCEDURAL STATES: Chronic | ICD-10-CM

## 2023-10-09 DIAGNOSIS — M25.562 PAIN IN LEFT KNEE: ICD-10-CM

## 2023-10-09 DIAGNOSIS — M25.561 PAIN IN RIGHT KNEE: ICD-10-CM

## 2023-10-09 DIAGNOSIS — Z90.49 ACQUIRED ABSENCE OF OTHER SPECIFIED PARTS OF DIGESTIVE TRACT: Chronic | ICD-10-CM

## 2023-10-09 DIAGNOSIS — Z90.710 ACQUIRED ABSENCE OF BOTH CERVIX AND UTERUS: Chronic | ICD-10-CM

## 2023-10-09 PROCEDURE — 73522 X-RAY EXAM HIPS BI 3-4 VIEWS: CPT

## 2023-10-09 PROCEDURE — 73562 X-RAY EXAM OF KNEE 3: CPT | Mod: 50

## 2023-10-09 PROCEDURE — 73562 X-RAY EXAM OF KNEE 3: CPT | Mod: 26,50

## 2023-10-09 PROCEDURE — 73522 X-RAY EXAM HIPS BI 3-4 VIEWS: CPT | Mod: 26

## 2023-10-10 DIAGNOSIS — M25.561 PAIN IN RIGHT KNEE: ICD-10-CM

## 2023-10-10 DIAGNOSIS — M25.562 PAIN IN LEFT KNEE: ICD-10-CM

## 2023-10-17 ENCOUNTER — APPOINTMENT (OUTPATIENT)
Dept: VASCULAR SURGERY | Facility: CLINIC | Age: 63
End: 2023-10-17
Payer: COMMERCIAL

## 2023-10-17 PROCEDURE — 93971 EXTREMITY STUDY: CPT

## 2023-10-23 NOTE — H&P PST ADULT - NS MD HP PULSE CAROTID
From: Tahmina Armas. To: Dr. Hoskins Erb: 10/23/2023 2:27 PM EDT  Subject: Meds     Will you please refill my pain meds. OxyCODONE. HCL.  10 mg tablet one every 8 hours for pain
Please Approve or Refuse.   Send to Pharmacy per Pt's Request: [facility]     Next Visit Date:  12/6/2023   Last Visit Date: 6/7/2023    Hemoglobin A1C (%)   Date Value   06/27/2023 8.7 (H)   04/26/2023 8.8   01/12/2023 9.5             ( goal A1C is < 7)   BP Readings from Last 3 Encounters:   08/29/23 132/70   06/07/23 110/80   03/22/23 (!) 169/52          (goal 120/80)  BUN   Date Value Ref Range Status   08/21/2023 44 (H) 6 - 20 mg/dL Final     Creatinine   Date Value Ref Range Status   08/21/2023 1.8 (H) 0.7 - 1.2 mg/dL Final     Potassium   Date Value Ref Range Status   08/21/2023 3.3 (L) 3.7 - 5.3 mmol/L Final
left normal/right normal

## 2023-10-28 ENCOUNTER — NON-APPOINTMENT (OUTPATIENT)
Age: 63
End: 2023-10-28

## 2023-11-03 ENCOUNTER — APPOINTMENT (OUTPATIENT)
Dept: VASCULAR SURGERY | Facility: CLINIC | Age: 63
End: 2023-11-03
Payer: COMMERCIAL

## 2023-11-03 PROCEDURE — 29580 STRAPPING UNNA BOOT: CPT | Mod: LT

## 2023-11-06 ENCOUNTER — RESULT REVIEW (OUTPATIENT)
Age: 63
End: 2023-11-06

## 2023-11-06 ENCOUNTER — OUTPATIENT (OUTPATIENT)
Dept: OUTPATIENT SERVICES | Facility: HOSPITAL | Age: 63
LOS: 1 days | End: 2023-11-06
Payer: COMMERCIAL

## 2023-11-06 DIAGNOSIS — Z96.652 PRESENCE OF LEFT ARTIFICIAL KNEE JOINT: Chronic | ICD-10-CM

## 2023-11-06 DIAGNOSIS — R92.8 OTHER ABNORMAL AND INCONCLUSIVE FINDINGS ON DIAGNOSTIC IMAGING OF BREAST: ICD-10-CM

## 2023-11-06 DIAGNOSIS — Z12.31 ENCOUNTER FOR SCREENING MAMMOGRAM FOR MALIGNANT NEOPLASM OF BREAST: ICD-10-CM

## 2023-11-06 DIAGNOSIS — Z98.890 OTHER SPECIFIED POSTPROCEDURAL STATES: Chronic | ICD-10-CM

## 2023-11-06 DIAGNOSIS — Z90.710 ACQUIRED ABSENCE OF BOTH CERVIX AND UTERUS: Chronic | ICD-10-CM

## 2023-11-06 PROCEDURE — 77066 DX MAMMO INCL CAD BI: CPT

## 2023-11-06 PROCEDURE — 77066 DX MAMMO INCL CAD BI: CPT | Mod: 26

## 2023-11-06 PROCEDURE — G0279: CPT

## 2023-11-06 PROCEDURE — G0279: CPT | Mod: 26

## 2023-11-07 DIAGNOSIS — R92.8 OTHER ABNORMAL AND INCONCLUSIVE FINDINGS ON DIAGNOSTIC IMAGING OF BREAST: ICD-10-CM

## 2023-11-10 ENCOUNTER — APPOINTMENT (OUTPATIENT)
Dept: PLASTIC SURGERY | Facility: CLINIC | Age: 63
End: 2023-11-10
Payer: COMMERCIAL

## 2023-11-10 PROCEDURE — 99213 OFFICE O/P EST LOW 20 MIN: CPT

## 2023-11-10 NOTE — ASU PATIENT PROFILE, ADULT - SURGICAL SITE INCISION
Referred by: Harley Carrillo MD    Where did today's visit take place. clinic      [facility]    What Matters:    Patient states that family and getting better is most important to him    Mentation:    The following cognitive assessments have been performed.   A [facility] examination was performed and patient scored 20/30.  He was able to recall the 5 words and  Completed the clock.  Will scan testing into his chart.  A Geriatric Depression Scale was also done and he scored 7/15,  Patient did state that his spouse recently .  He states he is sad about this and allowed patient to vent his feelings.  Dr. Carrillo also informed.           Mobility:    The following mobility assessments have been performed/discussed.  Patient was able to get up from chair easily.  He walks with a steady gait and did not use any aides.        Medications  Medications were reviewed by provider today.    Current Outpatient Medications   Medication Sig Dispense Refill   • meloxicam (MOBIC) 7.5 MG tablet Take 1 tablet by mouth daily as needed for Pain. 30 tablet 1   • gabapentin (NEURONTIN) 300 MG capsule Take 1 capsule by mouth at bedtime as needed (back pain). 60 capsule 1   • donepezil (ARICEPT) 5 MG tablet Take 1 tablet by mouth daily. 90 tablet 3   • tadalafil (CIALIS) 10 MG tablet Take 1 tablet by mouth as needed for Erectile Dysfunction. 10 tablet 1     No current facility-administered medications for this visit.          Resources    Was any resource material given to the patient today? Yes: Advanced Directives , ADRC, Transportation and Medical Alert  Patient is here with one of his daughters and patient is living in [place] with her right now, but would like to move back to his home in [place], [place].  Discussed contacting the ADRC for benefits and also if qualifies for caregivers.  Discussed medical alert and gave brochures, but patient is refusing.  Discussed transportation through dial a ride and he was familiar with  this. Patient's daughter had questions on his medicare insurance and gave her card to call the medicare solutions counselor or the ADRC. Gave senior resource nurse card to call with any further questions.           no

## 2023-11-13 ENCOUNTER — APPOINTMENT (OUTPATIENT)
Dept: VASCULAR SURGERY | Facility: CLINIC | Age: 63
End: 2023-11-13

## 2023-11-20 ENCOUNTER — APPOINTMENT (OUTPATIENT)
Dept: PULMONOLOGY | Facility: CLINIC | Age: 63
End: 2023-11-20
Payer: COMMERCIAL

## 2023-11-20 VITALS
DIASTOLIC BLOOD PRESSURE: 78 MMHG | OXYGEN SATURATION: 95 % | WEIGHT: 277 LBS | SYSTOLIC BLOOD PRESSURE: 140 MMHG | BODY MASS INDEX: 47.29 KG/M2 | HEIGHT: 64 IN

## 2023-11-20 DIAGNOSIS — J32.9 CHRONIC SINUSITIS, UNSPECIFIED: ICD-10-CM

## 2023-11-20 PROCEDURE — 71046 X-RAY EXAM CHEST 2 VIEWS: CPT

## 2023-11-20 PROCEDURE — 99214 OFFICE O/P EST MOD 30 MIN: CPT | Mod: 25

## 2023-11-27 DIAGNOSIS — R05.3 CHRONIC COUGH: ICD-10-CM

## 2023-11-27 NOTE — CONSULT NOTE ADULT - ATTENDING COMMENTS
40  minutes spent on discharge.  I discussed with Ms. Kolb.     I examined the patient with the pa and resident and discussed my plan with them    the patient has subcutaneous air that is likely related to trocar insufflation.  pain unlikely attributable to this phenomenon, can follow expectantly I examined the patient with the pa and resident and discussed my plan with them    the patient has subcutaneous air that is likely related to trocar insufflation.  pain may be attributable to this phenomenon, can follow expectantly without need for drainage

## 2023-11-30 ENCOUNTER — APPOINTMENT (OUTPATIENT)
Dept: PULMONOLOGY | Facility: CLINIC | Age: 63
End: 2023-11-30
Payer: COMMERCIAL

## 2023-11-30 VITALS
WEIGHT: 274 LBS | OXYGEN SATURATION: 96 % | BODY MASS INDEX: 46.78 KG/M2 | HEART RATE: 86 BPM | DIASTOLIC BLOOD PRESSURE: 70 MMHG | SYSTOLIC BLOOD PRESSURE: 130 MMHG | HEIGHT: 64 IN

## 2023-11-30 DIAGNOSIS — J45.909 UNSPECIFIED ASTHMA, UNCOMPLICATED: ICD-10-CM

## 2023-11-30 DIAGNOSIS — R09.82 POSTNASAL DRIP: ICD-10-CM

## 2023-11-30 PROCEDURE — 99214 OFFICE O/P EST MOD 30 MIN: CPT

## 2023-11-30 RX ORDER — MOMETASONE 50 UG/1
50 SPRAY, METERED NASAL DAILY
Qty: 1 | Refills: 3 | Status: ACTIVE | COMMUNITY
Start: 2023-11-30 | End: 1900-01-01

## 2023-11-30 RX ORDER — AZELASTINE HYDROCHLORIDE 137 UG/1
0.1 SPRAY, METERED NASAL
Qty: 30 | Refills: 5 | Status: ACTIVE | COMMUNITY
Start: 2023-11-30 | End: 1900-01-01

## 2023-12-01 ENCOUNTER — APPOINTMENT (OUTPATIENT)
Dept: BREAST CENTER | Facility: CLINIC | Age: 63
End: 2023-12-01
Payer: COMMERCIAL

## 2023-12-01 VITALS
DIASTOLIC BLOOD PRESSURE: 83 MMHG | WEIGHT: 274 LBS | HEIGHT: 64 IN | BODY MASS INDEX: 46.78 KG/M2 | SYSTOLIC BLOOD PRESSURE: 165 MMHG

## 2023-12-01 PROCEDURE — 99214 OFFICE O/P EST MOD 30 MIN: CPT

## 2023-12-15 ENCOUNTER — APPOINTMENT (OUTPATIENT)
Dept: VASCULAR SURGERY | Facility: CLINIC | Age: 63
End: 2023-12-15
Payer: COMMERCIAL

## 2023-12-15 DIAGNOSIS — I87.2 VENOUS INSUFFICIENCY (CHRONIC) (PERIPHERAL): ICD-10-CM

## 2023-12-15 PROCEDURE — 29580 STRAPPING UNNA BOOT: CPT | Mod: LT

## 2023-12-15 NOTE — ASSESSMENT
[FreeTextEntry1] : The patient is a 63-year-old female who presents for follow-up evaluation and a new left leg venous stasis ulcer with weeping edema.  I am prescribing clobetasol cream topically to her right lower extremity and left lower extremity.  I would like to place left leg Unna boot this week and I will see her back in my office in 1 week's time or sooner if any new symptoms develop

## 2023-12-15 NOTE — PHYSICAL EXAM
[Ankle Swelling (On Exam)] : present [Ankle Swelling Bilaterally] : bilaterally  [Ankle Swelling On The Left] : moderate [FreeTextEntry1] : Left leg superficial venous stasis ulcer 1 cm x 1 cm in size with eczema-like rash in her right and left lower extremity.

## 2023-12-15 NOTE — HISTORY OF PRESENT ILLNESS
[FreeTextEntry1] : The patient presents with new left leg venous stasis ulcer and weeping edema.  She also complains of pruritus to her bilateral lower extremities.

## 2023-12-15 NOTE — REASON FOR VISIT
[Follow-Up: _____] : a [unfilled] follow-up visit [FreeTextEntry1] : New left leg venous stasis ulcer weeping edema and superficial dermatitis to her bilateral lower extremities

## 2023-12-22 ENCOUNTER — APPOINTMENT (OUTPATIENT)
Dept: VASCULAR SURGERY | Facility: CLINIC | Age: 63
End: 2023-12-22
Payer: COMMERCIAL

## 2023-12-22 PROCEDURE — 29580 STRAPPING UNNA BOOT: CPT | Mod: LT

## 2023-12-22 NOTE — ASSESSMENT
[FreeTextEntry1] : 62 y/o F with left leg venous ulcer, on unna boot therapy.  Unna boot reapplied.  Follow up in one week.

## 2023-12-28 ENCOUNTER — APPOINTMENT (OUTPATIENT)
Dept: VASCULAR SURGERY | Facility: CLINIC | Age: 63
End: 2023-12-28
Payer: COMMERCIAL

## 2023-12-28 PROCEDURE — 99212 OFFICE O/P EST SF 10 MIN: CPT

## 2023-12-28 NOTE — ASSESSMENT
[FreeTextEntry1] : 64 y/o F with left leg venous ulcer, on unna boot therapy.  She is going away and unna boot was not applied today. She was advised on daily wound care and ace bandage for compression.  Follow up in one week for unna boot.

## 2024-01-04 NOTE — H&P PST ADULT - NS PRO ABUSE SCREEN SUSPICION NEGLECT YN
----- Message from Crista Yeh MA sent at 1/4/2024  2:56 PM EST -----  Regarding: FW: increase medication  Contact: [phone]    ----- Message -----  From: Christina Odonnell  Sent: 1/4/2024   2:52 PM EST  To: #  Subject: increase medication                              Once a day around 8:30pm. The 300 mg. Is not working for my restless leg. Thanks     
no

## 2024-01-05 ENCOUNTER — APPOINTMENT (OUTPATIENT)
Dept: VASCULAR SURGERY | Facility: CLINIC | Age: 64
End: 2024-01-05
Payer: COMMERCIAL

## 2024-01-05 PROCEDURE — 29580 STRAPPING UNNA BOOT: CPT | Mod: LT

## 2024-01-05 NOTE — ASSESSMENT
[FreeTextEntry1] : 62 y/o F with left leg venous ulcer, on unna boot therapy.  Unna boot reapplied.  Follow up in one week for unna boot.

## 2024-01-08 ENCOUNTER — OUTPATIENT (OUTPATIENT)
Dept: OUTPATIENT SERVICES | Facility: HOSPITAL | Age: 64
LOS: 1 days | End: 2024-01-08
Payer: COMMERCIAL

## 2024-01-08 ENCOUNTER — APPOINTMENT (OUTPATIENT)
Dept: HEMATOLOGY ONCOLOGY | Facility: CLINIC | Age: 64
End: 2024-01-08
Payer: COMMERCIAL

## 2024-01-08 VITALS
BODY MASS INDEX: 46.78 KG/M2 | DIASTOLIC BLOOD PRESSURE: 98 MMHG | RESPIRATION RATE: 78 BRPM | HEART RATE: 90 BPM | OXYGEN SATURATION: 97 % | SYSTOLIC BLOOD PRESSURE: 140 MMHG | HEIGHT: 64 IN | TEMPERATURE: 98.6 F | WEIGHT: 274 LBS

## 2024-01-08 DIAGNOSIS — Z96.652 PRESENCE OF LEFT ARTIFICIAL KNEE JOINT: Chronic | ICD-10-CM

## 2024-01-08 DIAGNOSIS — Z79.811 ENCOUNTER FOR THERAPEUTIC DRUG LVL MONITORING: ICD-10-CM

## 2024-01-08 DIAGNOSIS — C43.72 MALIGNANT MELANOMA OF LEFT LOWER LIMB, INCLUDING HIP: ICD-10-CM

## 2024-01-08 DIAGNOSIS — R21 RASH AND OTHER NONSPECIFIC SKIN ERUPTION: ICD-10-CM

## 2024-01-08 DIAGNOSIS — Z98.890 OTHER SPECIFIED POSTPROCEDURAL STATES: Chronic | ICD-10-CM

## 2024-01-08 DIAGNOSIS — Z90.49 ACQUIRED ABSENCE OF OTHER SPECIFIED PARTS OF DIGESTIVE TRACT: Chronic | ICD-10-CM

## 2024-01-08 DIAGNOSIS — Z90.710 ACQUIRED ABSENCE OF BOTH CERVIX AND UTERUS: Chronic | ICD-10-CM

## 2024-01-08 DIAGNOSIS — Z86.718 PERSONAL HISTORY OF OTHER VENOUS THROMBOSIS AND EMBOLISM: ICD-10-CM

## 2024-01-08 DIAGNOSIS — Z51.81 ENCOUNTER FOR THERAPEUTIC DRUG LVL MONITORING: ICD-10-CM

## 2024-01-08 PROCEDURE — 99214 OFFICE O/P EST MOD 30 MIN: CPT

## 2024-01-08 RX ORDER — ANASTROZOLE TABLETS 1 MG/1
1 TABLET ORAL
Qty: 90 | Refills: 2 | Status: ACTIVE | COMMUNITY
Start: 2020-04-28 | End: 1900-01-01

## 2024-01-08 RX ORDER — KETOCONAZOLE 20 MG/G
2 CREAM TOPICAL TWICE DAILY
Qty: 1 | Refills: 1 | Status: ACTIVE | COMMUNITY
Start: 2024-01-08 | End: 1900-01-01

## 2024-01-09 DIAGNOSIS — C43.72 MALIGNANT MELANOMA OF LEFT LOWER LIMB, INCLUDING HIP: ICD-10-CM

## 2024-01-09 PROBLEM — Z51.81 ENCOUNTER FOR MONITORING AROMATASE INHIBITOR THERAPY: Status: ACTIVE | Noted: 2022-12-28

## 2024-01-09 PROBLEM — Z86.718 HISTORY OF DVT OF LOWER EXTREMITY: Status: ACTIVE | Noted: 2022-12-28

## 2024-01-12 ENCOUNTER — APPOINTMENT (OUTPATIENT)
Dept: VASCULAR SURGERY | Facility: CLINIC | Age: 64
End: 2024-01-12
Payer: COMMERCIAL

## 2024-01-12 PROCEDURE — 29580 STRAPPING UNNA BOOT: CPT | Mod: LT

## 2024-01-19 ENCOUNTER — APPOINTMENT (OUTPATIENT)
Dept: VASCULAR SURGERY | Facility: CLINIC | Age: 64
End: 2024-01-19
Payer: COMMERCIAL

## 2024-01-19 PROCEDURE — 99212 OFFICE O/P EST SF 10 MIN: CPT

## 2024-01-19 NOTE — ASSESSMENT
[FreeTextEntry1] : 62 y/o F with left leg venous ulcer, on unna boot therapy. She is having pain to left heel and Achilles tendon and wants to hold off on unna boot dressing today.  Ace bandage applied. Instructed use of emollient and ace bandage daily.  Follow up in one week for unna boot.

## 2024-01-22 ENCOUNTER — OUTPATIENT (OUTPATIENT)
Dept: OUTPATIENT SERVICES | Facility: HOSPITAL | Age: 64
LOS: 1 days | End: 2024-01-22
Payer: COMMERCIAL

## 2024-01-22 ENCOUNTER — APPOINTMENT (OUTPATIENT)
Dept: OBGYN | Facility: CLINIC | Age: 64
End: 2024-01-22
Payer: COMMERCIAL

## 2024-01-22 VITALS
DIASTOLIC BLOOD PRESSURE: 84 MMHG | WEIGHT: 272 LBS | SYSTOLIC BLOOD PRESSURE: 146 MMHG | HEIGHT: 64 IN | BODY MASS INDEX: 46.44 KG/M2

## 2024-01-22 DIAGNOSIS — Z98.890 OTHER SPECIFIED POSTPROCEDURAL STATES: Chronic | ICD-10-CM

## 2024-01-22 DIAGNOSIS — Z90.710 ACQUIRED ABSENCE OF BOTH CERVIX AND UTERUS: Chronic | ICD-10-CM

## 2024-01-22 DIAGNOSIS — Z01.419 ENCOUNTER FOR GYNECOLOGICAL EXAMINATION (GENERAL) (ROUTINE) WITHOUT ABNORMAL FINDINGS: ICD-10-CM

## 2024-01-22 DIAGNOSIS — Z01.419 ENCOUNTER FOR GYNECOLOGICAL EXAMINATION (GENERAL) (ROUTINE) W/OUT ABNORMAL FINDINGS: ICD-10-CM

## 2024-01-22 PROCEDURE — 99396 PREV VISIT EST AGE 40-64: CPT
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
## 2024-01-23 DIAGNOSIS — Z01.419 ENCOUNTER FOR GYNECOLOGICAL EXAMINATION (GENERAL) (ROUTINE) WITHOUT ABNORMAL FINDINGS: ICD-10-CM

## 2024-01-26 ENCOUNTER — APPOINTMENT (OUTPATIENT)
Dept: VASCULAR SURGERY | Facility: CLINIC | Age: 64
End: 2024-01-26
Payer: COMMERCIAL

## 2024-01-26 PROCEDURE — 29580 STRAPPING UNNA BOOT: CPT | Mod: LT

## 2024-01-26 NOTE — ASSESSMENT
[FreeTextEntry1] : 64 y/o F with left leg venous ulcer, on unna boot therapy.  Unna boot reapplied.  Follow up in one week for unna boot.

## 2024-01-29 ENCOUNTER — APPOINTMENT (OUTPATIENT)
Dept: VASCULAR SURGERY | Facility: CLINIC | Age: 64
End: 2024-01-29
Payer: COMMERCIAL

## 2024-01-29 PROCEDURE — 29580 STRAPPING UNNA BOOT: CPT | Mod: LT

## 2024-02-02 ENCOUNTER — APPOINTMENT (OUTPATIENT)
Dept: VASCULAR SURGERY | Facility: CLINIC | Age: 64
End: 2024-02-02
Payer: COMMERCIAL

## 2024-02-02 PROCEDURE — 99212 OFFICE O/P EST SF 10 MIN: CPT

## 2024-02-02 NOTE — ASSESSMENT
[FreeTextEntry1] : 64 y/o F with left leg venous ulcer, on unna boot therapy.  Compression dressing applied. Unna boot not applied today as she being fitted for orthotics tomorrow.  Follow up in one week for unna boot.

## 2024-02-02 NOTE — ASSESSMENT
[FreeTextEntry1] : 64 y/o F with left leg venous ulcer, on unna boot therapy.  Compression dressing applied. Unna boot not applied today as she being fitted for orthotics.  Follow up in one week for unna boot.

## 2024-02-09 ENCOUNTER — APPOINTMENT (OUTPATIENT)
Dept: VASCULAR SURGERY | Facility: CLINIC | Age: 64
End: 2024-02-09
Payer: COMMERCIAL

## 2024-02-09 PROCEDURE — 29580 STRAPPING UNNA BOOT: CPT | Mod: LT

## 2024-02-09 NOTE — ED PROVIDER NOTE - PROGRESS NOTE DETAILS
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  DOS: 10/31/2023 Procedure: Left Shoulder Arthroscopy, Glenohumeral Debridement, Subacromial Decompression, Medium Rotator Cuff Repair (DR), Distal Clavicle Resection Diagnosis: Subacromial Impingement, Medium Rotator Cuff Tear, AC Arthralgia, Shoulder Pain, Glenohumeral Synovitis, SLAP Tear, Partial Posterior Labral Tear  He has been attending PT.  He continues to make gains.  GYN and URO consulted

## 2024-02-09 NOTE — ASSESSMENT
[FreeTextEntry1] : 62 y/o F with left leg venous ulcer, on unna boot therapy.  Unna boot applied today to LLE.  Follow up in one week for unna boot.

## 2024-02-16 ENCOUNTER — APPOINTMENT (OUTPATIENT)
Dept: VASCULAR SURGERY | Facility: CLINIC | Age: 64
End: 2024-02-16
Payer: COMMERCIAL

## 2024-02-16 PROCEDURE — 29580 STRAPPING UNNA BOOT: CPT | Mod: LT

## 2024-02-16 NOTE — ASSESSMENT
[FreeTextEntry1] : 62 y/o F with left leg venous ulcer, on unna boot therapy.  Unna boot reapplied today to LLE.  Follow up in one week for unna boot.

## 2024-02-22 ENCOUNTER — APPOINTMENT (OUTPATIENT)
Dept: VASCULAR SURGERY | Facility: CLINIC | Age: 64
End: 2024-02-22
Payer: COMMERCIAL

## 2024-02-22 VITALS — HEIGHT: 64 IN

## 2024-02-22 DIAGNOSIS — M79.605 PAIN IN RIGHT LEG: ICD-10-CM

## 2024-02-22 DIAGNOSIS — M79.604 PAIN IN RIGHT LEG: ICD-10-CM

## 2024-02-22 PROCEDURE — 93970 EXTREMITY STUDY: CPT

## 2024-02-22 PROCEDURE — 29580 STRAPPING UNNA BOOT: CPT | Mod: LT

## 2024-02-22 NOTE — HISTORY OF PRESENT ILLNESS
[FreeTextEntry1] : 64 y/o F with left leg venous ulcer, on unna boot therapy.  She c/o pain in the legs and was concerned of DVT.

## 2024-02-22 NOTE — DATA REVIEWED
[FreeTextEntry1] : I performed a venous duplex which was medically necessary to evaluate for DVT. It showed no evidence of DVT in the lower extremities bilaterally.

## 2024-02-22 NOTE — ASSESSMENT
[FreeTextEntry1] : 64 y/o F with left leg venous ulcer, on unna boot therapy.  Unna boot reapplied today to LLE. Venous duplex of the lower extremities was negative for DVT.  Follow up in one week for unna boot.

## 2024-03-01 ENCOUNTER — APPOINTMENT (OUTPATIENT)
Dept: VASCULAR SURGERY | Facility: CLINIC | Age: 64
End: 2024-03-01

## 2024-03-01 ENCOUNTER — APPOINTMENT (OUTPATIENT)
Dept: VASCULAR SURGERY | Facility: CLINIC | Age: 64
End: 2024-03-01
Payer: COMMERCIAL

## 2024-03-01 PROCEDURE — 29580 STRAPPING UNNA BOOT: CPT | Mod: LT

## 2024-03-01 NOTE — ASSESSMENT
[FreeTextEntry1] : 62 y/o F with left leg venous ulcer, guero on unna boot therapy.  Unna boot reapplied today to LLE.   Follow up in one week for unna boot.

## 2024-03-11 ENCOUNTER — APPOINTMENT (OUTPATIENT)
Dept: VASCULAR SURGERY | Facility: CLINIC | Age: 64
End: 2024-03-11
Payer: COMMERCIAL

## 2024-03-11 PROCEDURE — 29580 STRAPPING UNNA BOOT: CPT | Mod: LT

## 2024-03-15 ENCOUNTER — APPOINTMENT (OUTPATIENT)
Dept: VASCULAR SURGERY | Facility: CLINIC | Age: 64
End: 2024-03-15
Payer: COMMERCIAL

## 2024-03-15 PROCEDURE — 29580 STRAPPING UNNA BOOT: CPT | Mod: LT

## 2024-03-15 NOTE — ASSESSMENT
[FreeTextEntry1] : 65 y/o F with left leg venous ulcer, guero on unna boot therapy.  Unna boot reapplied today to LLE.   Follow up in one week for unna boot.   I, Dr. Duran, personally performed the evaluation and management (E/M) services for this established patient who presents today with (a) new problem(s)/exacerbation of (an) existing condition(s). That E/M includes conducting the clinically appropriate interval history &/or exam, assessing all new/exacerbated conditions, and establishing a new plan of care. Today, my JOSÉ, Mohini], was here to observe my evaluation and management service for this new problem/exacerbated condition and follow the plan of care established by me going forward.  Thank you for allowing me to participate in the care of your patient.   Sincerely,   Surinder Duran MD, RPVI, FACS Associate Professor of Surgery , Vascular Fellowship Director of Limb Salvage Surgery [facility] [employer] [facility] at [facility]

## 2024-03-22 ENCOUNTER — APPOINTMENT (OUTPATIENT)
Dept: VASCULAR SURGERY | Facility: CLINIC | Age: 64
End: 2024-03-22
Payer: COMMERCIAL

## 2024-03-22 PROCEDURE — 29580 STRAPPING UNNA BOOT: CPT | Mod: LT

## 2024-03-22 NOTE — ASSESSMENT
[FreeTextEntry1] : 65 y/o F with left leg venous ulcer, guero on unna boot therapy.  Unna boot reapplied today to LLE.   Follow up in one week for unna boot.

## 2024-03-29 ENCOUNTER — APPOINTMENT (OUTPATIENT)
Dept: VASCULAR SURGERY | Facility: CLINIC | Age: 64
End: 2024-03-29
Payer: COMMERCIAL

## 2024-03-29 DIAGNOSIS — S81.802D UNSPECIFIED OPEN WOUND, LEFT LOWER LEG, SUBSEQUENT ENCOUNTER: ICD-10-CM

## 2024-03-29 PROCEDURE — 29580 STRAPPING UNNA BOOT: CPT | Mod: LT

## 2024-04-05 ENCOUNTER — APPOINTMENT (OUTPATIENT)
Dept: VASCULAR SURGERY | Facility: CLINIC | Age: 64
End: 2024-04-05
Payer: COMMERCIAL

## 2024-04-05 PROCEDURE — 99212 OFFICE O/P EST SF 10 MIN: CPT

## 2024-04-05 NOTE — ASSESSMENT
[FreeTextEntry1] : 65 y/o F with left leg venous ulcer, now healed on unna boot therapy.  Advised use of emollient, lymphapress pumps and compression therapy daily.  Follow up as needed.

## 2024-04-12 ENCOUNTER — APPOINTMENT (OUTPATIENT)
Dept: VASCULAR SURGERY | Facility: CLINIC | Age: 64
End: 2024-04-12
Payer: COMMERCIAL

## 2024-04-12 DIAGNOSIS — I83.029 VARICOSE VEINS OF LEFT LOWER EXTREMITY WITH ULCER OF UNSPECIFIED SITE: ICD-10-CM

## 2024-04-12 DIAGNOSIS — L97.929 VARICOSE VEINS OF LEFT LOWER EXTREMITY WITH ULCER OF UNSPECIFIED SITE: ICD-10-CM

## 2024-04-12 DIAGNOSIS — L03.116 CELLULITIS OF LEFT LOWER LIMB: ICD-10-CM

## 2024-04-12 PROCEDURE — 99213 OFFICE O/P EST LOW 20 MIN: CPT

## 2024-04-12 NOTE — HISTORY OF PRESENT ILLNESS
[FreeTextEntry1] : 63 y/o F with left leg venous ulcer, on unna boot therapy. C/o discomfort in the left leg and noticed redness this morning, no fever or chills.

## 2024-04-12 NOTE — ASSESSMENT
[FreeTextEntry1] : 63 y/o F with left leg venous ulcer, now healed on unna boot therapy.  She has cellulitis on LLE and Doxycycline prescribed.  Advised on compression daily.  Follow up in one week.

## 2024-04-12 NOTE — PHYSICAL EXAM
[FreeTextEntry1] : Left anterior and lateral leg with healed ulcerations, + erythema to left leg consistent with cellulitis.

## 2024-04-19 ENCOUNTER — APPOINTMENT (OUTPATIENT)
Dept: VASCULAR SURGERY | Facility: CLINIC | Age: 64
End: 2024-04-19
Payer: COMMERCIAL

## 2024-04-19 VITALS — BODY MASS INDEX: 46.61 KG/M2 | WEIGHT: 273 LBS | HEIGHT: 64 IN

## 2024-04-19 DIAGNOSIS — M79.89 OTHER SPECIFIED SOFT TISSUE DISORDERS: ICD-10-CM

## 2024-04-19 PROCEDURE — 99213 OFFICE O/P EST LOW 20 MIN: CPT

## 2024-04-19 PROCEDURE — 93970 EXTREMITY STUDY: CPT

## 2024-05-20 ENCOUNTER — OUTPATIENT (OUTPATIENT)
Dept: OUTPATIENT SERVICES | Facility: HOSPITAL | Age: 64
LOS: 1 days | End: 2024-05-20
Payer: COMMERCIAL

## 2024-05-20 ENCOUNTER — RESULT REVIEW (OUTPATIENT)
Age: 64
End: 2024-05-20

## 2024-05-20 DIAGNOSIS — Z90.49 ACQUIRED ABSENCE OF OTHER SPECIFIED PARTS OF DIGESTIVE TRACT: Chronic | ICD-10-CM

## 2024-05-20 DIAGNOSIS — R92.8 OTHER ABNORMAL AND INCONCLUSIVE FINDINGS ON DIAGNOSTIC IMAGING OF BREAST: ICD-10-CM

## 2024-05-20 DIAGNOSIS — Z96.652 PRESENCE OF LEFT ARTIFICIAL KNEE JOINT: Chronic | ICD-10-CM

## 2024-05-20 DIAGNOSIS — Z98.890 OTHER SPECIFIED POSTPROCEDURAL STATES: Chronic | ICD-10-CM

## 2024-05-20 DIAGNOSIS — Z90.710 ACQUIRED ABSENCE OF BOTH CERVIX AND UTERUS: Chronic | ICD-10-CM

## 2024-05-20 PROCEDURE — 77065 DX MAMMO INCL CAD UNI: CPT | Mod: 26,RT

## 2024-05-20 PROCEDURE — 77065 DX MAMMO INCL CAD UNI: CPT | Mod: RT

## 2024-05-20 PROCEDURE — G0279: CPT

## 2024-05-20 PROCEDURE — 77061 BREAST TOMOSYNTHESIS UNI: CPT | Mod: 26

## 2024-05-20 PROCEDURE — G0279: CPT | Mod: 26

## 2024-05-21 DIAGNOSIS — R92.8 OTHER ABNORMAL AND INCONCLUSIVE FINDINGS ON DIAGNOSTIC IMAGING OF BREAST: ICD-10-CM

## 2024-05-31 ENCOUNTER — APPOINTMENT (OUTPATIENT)
Dept: BREAST CENTER | Facility: CLINIC | Age: 64
End: 2024-05-31
Payer: COMMERCIAL

## 2024-05-31 DIAGNOSIS — R92.8 OTHER ABNORMAL AND INCONCLUSIVE FINDINGS ON DIAGNOSTIC IMAGING OF BREAST: ICD-10-CM

## 2024-05-31 PROCEDURE — 99214 OFFICE O/P EST MOD 30 MIN: CPT
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
## 2024-05-31 NOTE — PHYSICAL EXAM
[Normocephalic] : normocephalic [Atraumatic] : atraumatic [No Supraclavicular Adenopathy] : no supraclavicular adenopathy [Examined in the supine and seated position] : examined in the supine and seated position [No dominant masses] : no dominant masses in right breast  [No dominant masses] : no dominant masses left breast [No Nipple Discharge] : no left nipple discharge [Breast Nipple Inversion] : nipples not inverted [Breast Nipple Retraction] : nipples not retracted [No Edema] : no edema [No Rashes] : no rashes [No Ulceration] : no ulceration [de-identified] : well healed surgical scar.\par  palpable scar tissue.\par  (+) RT changes.  [de-identified] : No axillary lymphadenopathy appreciated. [de-identified] : No axillary lymphadenopathy appreciated.

## 2024-05-31 NOTE — DATA REVIEWED
[FreeTextEntry1] : Right Uni Dx Mammo - 05/20/2024: Breast composition: There are scattered areas of fibroglandular density.  Findings:  Mammogram:  No suspicious microcalcifications or areas of architectural distortion seen in the right breast. Stable right breast focal asymmetry noted. Short interval follow-up recommended.  Impression: Stable right breast focal asymmetry. Short interval follow-up recommended.  Recommendation: Follow-up bilateral diagnostic mammogram in 6 months.  BI-RADS category 3: Probably Benign

## 2024-05-31 NOTE — PAST MEDICAL HISTORY
[Postmenopausal] : The patient is postmenopausal [Menarche Age ____] : age at menarche was [unfilled] [Menopause Age____] : age at menopause was [unfilled] [History of Hormone Replacement Treatment] : has no history of hormone replacement treatment [Total Preg ___] : G[unfilled] [Live Births ___] : P[unfilled]  [FreeTextEntry5] : TAHBSO - 11/19/19. [FreeTextEntry6] : No. [FreeTextEntry7] : Yes.

## 2024-05-31 NOTE — REVIEW OF SYSTEMS
[Limb Pain] : limb pain [As Noted in HPI] : as noted in HPI [Breast Pain] : breast pain [Breast Lump] : no breast lump [Nipple Discharge] : no nipple discharge [Nipple Inverted] : no inversion of the nipple [Negative] : Constitutional

## 2024-05-31 NOTE — ASSESSMENT
[FreeTextEntry1] : CORY RECIO is a 64-year-old female patient who presents today in follow up for left breast DCIS; BIRADS-3 imaging review. She has had some sporadic left breast pain. Unfortunately, she suffered a traumatic accident to her left lower extremity in February 2023 and has been under the care of plastic surgery / vascular for wound care and lymphedema.   Most recent imaging: Right Uni Dx Mammo - 05/20/2024: -There are scattered areas of fibroglandular density. -No suspicious microcalcifications or areas of architectural distortion seen in the right breast.  -Stable right breast focal asymmetry noted. Short interval follow-up recommended. BI-RADS category 3: Probably Benign   We again discussed BIRADS 3 lesions. These lesions have a 2% chance of harboring malignancy. There is always an option to obtain a tissue sample with a biopsy to confirm the diagnosis. The procedure was described in detail including the placement of a tissue marker clip. The risks of the procedure, including but not limited to bleeding and infection were also explained. She is not interested in biopsy at this time and agrees to continue with short-term interval imaging, which is traditionally performed at six-month intervals for approximately two years to establish stability.   Imaging with a bilateral diagnostic mammogram will be ordered for short term follow-up for November 2024, and that will be scheduled today. She will return for follow-up and clinical breast exam following imaging.    I spent a total of 30 minutes of face to face time with this patient, greater than 50% of which was spent in counseling and/or coordination of care. All of her questions were appropriately answered. She knows to call with any concerns.   PLAN: -B/L Dx Mammo - November 2024. -f/u after.

## 2024-06-04 ENCOUNTER — APPOINTMENT (OUTPATIENT)
Dept: RADIATION ONCOLOGY | Facility: HOSPITAL | Age: 64
End: 2024-06-04

## 2024-06-04 ENCOUNTER — APPOINTMENT (OUTPATIENT)
Dept: RADIATION ONCOLOGY | Facility: HOSPITAL | Age: 64
End: 2024-06-04
Payer: COMMERCIAL

## 2024-06-04 ENCOUNTER — OUTPATIENT (OUTPATIENT)
Dept: OUTPATIENT SERVICES | Facility: HOSPITAL | Age: 64
LOS: 1 days | End: 2024-06-04
Payer: COMMERCIAL

## 2024-06-04 VITALS
TEMPERATURE: 98.4 F | DIASTOLIC BLOOD PRESSURE: 83 MMHG | RESPIRATION RATE: 16 BRPM | OXYGEN SATURATION: 98 % | SYSTOLIC BLOOD PRESSURE: 152 MMHG | HEART RATE: 72 BPM

## 2024-06-04 DIAGNOSIS — D05.10 INTRADUCTAL CARCINOMA IN SITU OF UNSPECIFIED BREAST: ICD-10-CM

## 2024-06-04 DIAGNOSIS — Z90.49 ACQUIRED ABSENCE OF OTHER SPECIFIED PARTS OF DIGESTIVE TRACT: Chronic | ICD-10-CM

## 2024-06-04 DIAGNOSIS — I89.0 LYMPHEDEMA, NOT ELSEWHERE CLASSIFIED: ICD-10-CM

## 2024-06-04 DIAGNOSIS — Z98.890 OTHER SPECIFIED POSTPROCEDURAL STATES: Chronic | ICD-10-CM

## 2024-06-04 DIAGNOSIS — D05.12 INTRADUCTAL CARCINOMA IN SITU OF LEFT BREAST: ICD-10-CM

## 2024-06-04 DIAGNOSIS — Z90.710 ACQUIRED ABSENCE OF BOTH CERVIX AND UTERUS: Chronic | ICD-10-CM

## 2024-06-04 DIAGNOSIS — Z92.3 PERSONAL HISTORY OF IRRADIATION: ICD-10-CM

## 2024-06-04 DIAGNOSIS — Z96.652 PRESENCE OF LEFT ARTIFICIAL KNEE JOINT: Chronic | ICD-10-CM

## 2024-06-04 PROCEDURE — 99213 OFFICE O/P EST LOW 20 MIN: CPT

## 2024-06-04 RX ORDER — CLOBETASOL PROPIONATE 0.5 MG/G
0.05 CREAM TOPICAL TWICE DAILY
Qty: 1 | Refills: 2 | Status: DISCONTINUED | COMMUNITY
Start: 2023-12-15 | End: 2024-06-04

## 2024-06-04 RX ORDER — KETOCONAZOLE 20 MG/G
2 CREAM TOPICAL TWICE DAILY
Qty: 1 | Refills: 1 | Status: DISCONTINUED | COMMUNITY
Start: 2021-08-23 | End: 2024-06-04

## 2024-06-04 RX ORDER — DOXYCYCLINE 150 MG/1
150 TABLET, FILM COATED ORAL
Qty: 28 | Refills: 2 | Status: DISCONTINUED | COMMUNITY
Start: 2023-09-01 | End: 2024-06-04

## 2024-06-04 RX ORDER — ANASTROZOLE TABLETS 1 MG/1
1 TABLET ORAL
Refills: 0 | Status: DISCONTINUED | COMMUNITY
End: 2024-06-04

## 2024-06-04 RX ORDER — DOXYCYCLINE 150 MG/1
150 TABLET, FILM COATED ORAL
Qty: 28 | Refills: 1 | Status: DISCONTINUED | COMMUNITY
Start: 2023-08-04 | End: 2024-06-04

## 2024-06-04 RX ORDER — DOXYCYCLINE HYCLATE 100 MG/1
100 TABLET ORAL
Qty: 14 | Refills: 0 | Status: DISCONTINUED | COMMUNITY
Start: 2023-07-24 | End: 2024-06-04

## 2024-06-04 RX ORDER — BACITRACIN 500 [IU]/G
500 OINTMENT TOPICAL DAILY
Qty: 1 | Refills: 1 | Status: DISCONTINUED | COMMUNITY
Start: 2023-03-01 | End: 2024-06-04

## 2024-06-04 RX ORDER — DOXYCYCLINE HYCLATE 100 MG/1
100 TABLET ORAL TWICE DAILY
Qty: 20 | Refills: 0 | Status: DISCONTINUED | COMMUNITY
Start: 2024-04-12 | End: 2024-06-04

## 2024-06-04 RX ORDER — PREDNISONE 20 MG/1
20 TABLET ORAL
Qty: 15 | Refills: 0 | Status: DISCONTINUED | COMMUNITY
Start: 2023-11-27 | End: 2024-06-04

## 2024-06-06 DIAGNOSIS — D05.12 INTRADUCTAL CARCINOMA IN SITU OF LEFT BREAST: ICD-10-CM

## 2024-06-06 NOTE — HISTORY OF PRESENT ILLNESS
[FreeTextEntry1] :   CORY RECIO returns to clinic in follow up visit.  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 She is s/p breast conserving surgery.  She received 5240cGy to the left breast from 3/16/2020 through 4/6/2020 without any complications.   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  On 2/22/2023, s/p left LE trauma, s/p skin graft with Dr. Pratt on 4/12/2023. I last saw her in June 2023. She continues to take anastrozole with complaints of joint pain, worsening.    However, wishes to continue with AI for one more year.   Her leg wound has completely healed and now has LE lymphedema.  She denies breast pain, has sensitivity to the treated site.   No breast concerns.   Otherwise, she reports doing well.    Right breast diagnostic mammogram/ultrasound on 5/20/2024 shows, stable right breast focal asymmetry. Short interval follow-up recommended. Recommendation: Follow-up bilateral diagnostic mammogram in 6 months.  BI-RADS category 3: Probably Benign  Dr. Joseph 7/15/2024 Upcoming b/l mammo Nov 2024 12/13/2024 ACP- breast Sx

## 2024-06-06 NOTE — DISEASE MANAGEMENT
[Pathological] : TNM Stage: p [0] : 0 [TTNM] : is [FreeTextEntry4] : Intermediate grade DCIS of the left breast, ER/AK positive.   [NTNM] : 0 [MTNM] : 0

## 2024-06-06 NOTE — REVIEW OF SYSTEMS
[Joint Pain] : joint pain [Muscle Pain] : muscle pain [Negative] : Psychiatric [Fever] : no fever [Chills] : no chills [Chest Pain] : no chest pain [Palpitations] : no palpitations [Shortness Of Breath] : no shortness of breath [Confused] : no confusion [Dizziness] : no dizziness

## 2024-06-06 NOTE — PHYSICAL EXAM
[Sclera] : the sclera and conjunctiva were normal [Hearing Threshold Finger Rub Not [place]] : hearing was normal [Heart Rate And Rhythm] : heart rate and rhythm were normal [Heart Sounds] : normal S1 and S2 [Murmurs] : no murmurs present [No Discharge] : no discharge [No Masses] : no breast masses were palpable [Abdomen Soft] : soft [Abdomen Tenderness] : non-tender [Cervical Lymph Nodes Enlarged Posterior Bilaterally] : posterior cervical [Cervical Lymph Nodes Enlarged Anterior Bilaterally] : anterior cervical [Supraclavicular Lymph Nodes Enlarged Bilaterally] : supraclavicular [Nail Clubbing] : no clubbing  or cyanosis of the fingernails [No Focal Deficits] : no focal deficits [Motor Exam] : the motor exam was normal [Enlargement Of The Right Breast] : no swelling [Tenderness Of The Right Breast] : no tenderness [___] :  no erythema [Enlargement Of The Left Breast] : no swelling [Tenderness Of The Left Breast] : no tenderness [Axillary Lymph Nodes Enlarged Bilaterally] : no enlarged nodes [Normal] : no palpable adenopathy [de-identified] : lymphedema - LE

## 2024-06-06 NOTE — LETTER CLOSING
[Consult Closing:] : Thank you for allowing me to participate in the care of this patient.  If you have any questions, please do not hesitate to contact me. [Sincerely yours,] : Sincerely yours, [FreeTextEntry3] : Meliza Oswald M.D.   Electronically proofread and signed by:  Meliza Oswald MD Attending, Department of Radiation Medicine [facility]

## 2024-06-29 ENCOUNTER — EMERGENCY (EMERGENCY)
Facility: HOSPITAL | Age: 64
LOS: 0 days | Discharge: HOME | End: 2024-06-29
Attending: EMERGENCY MEDICINE
Payer: COMMERCIAL

## 2024-06-29 VITALS
RESPIRATION RATE: 20 BRPM | OXYGEN SATURATION: 98 % | WEIGHT: 279.11 LBS | SYSTOLIC BLOOD PRESSURE: 158 MMHG | TEMPERATURE: 96 F | HEART RATE: 88 BPM | DIASTOLIC BLOOD PRESSURE: 80 MMHG

## 2024-06-29 DIAGNOSIS — Z90.49 ACQUIRED ABSENCE OF OTHER SPECIFIED PARTS OF DIGESTIVE TRACT: Chronic | ICD-10-CM

## 2024-06-29 DIAGNOSIS — T78.40XA ALLERGY, UNSPECIFIED, INITIAL ENCOUNTER: ICD-10-CM

## 2024-06-29 DIAGNOSIS — Z98.890 OTHER SPECIFIED POSTPROCEDURAL STATES: Chronic | ICD-10-CM

## 2024-06-29 DIAGNOSIS — Z96.652 PRESENCE OF LEFT ARTIFICIAL KNEE JOINT: Chronic | ICD-10-CM

## 2024-06-29 DIAGNOSIS — E03.9 HYPOTHYROIDISM, UNSPECIFIED: ICD-10-CM

## 2024-06-29 DIAGNOSIS — I89.0 LYMPHEDEMA, NOT ELSEWHERE CLASSIFIED: ICD-10-CM

## 2024-06-29 DIAGNOSIS — Z88.2 ALLERGY STATUS TO SULFONAMIDES: ICD-10-CM

## 2024-06-29 DIAGNOSIS — Z88.8 ALLERGY STATUS TO OTHER DRUGS, MEDICAMENTS AND BIOLOGICAL SUBSTANCES: ICD-10-CM

## 2024-06-29 DIAGNOSIS — Z91.040 LATEX ALLERGY STATUS: ICD-10-CM

## 2024-06-29 DIAGNOSIS — Z90.710 ACQUIRED ABSENCE OF BOTH CERVIX AND UTERUS: Chronic | ICD-10-CM

## 2024-06-29 DIAGNOSIS — T78.1XXA OTHER ADVERSE FOOD REACTIONS, NOT ELSEWHERE CLASSIFIED, INITIAL ENCOUNTER: ICD-10-CM

## 2024-06-29 PROCEDURE — 93010 ELECTROCARDIOGRAM REPORT: CPT

## 2024-06-29 PROCEDURE — 93005 ELECTROCARDIOGRAM TRACING: CPT

## 2024-06-29 PROCEDURE — 99283 EMERGENCY DEPT VISIT LOW MDM: CPT | Mod: 25

## 2024-06-29 PROCEDURE — 99284 EMERGENCY DEPT VISIT MOD MDM: CPT

## 2024-06-29 RX ORDER — DIPHENHYDRAMINE HCL 12.5MG/5ML
50 ELIXIR ORAL ONCE
Refills: 0 | Status: COMPLETED | OUTPATIENT
Start: 2024-06-29 | End: 2024-06-29

## 2024-06-29 RX ORDER — DEXAMETHASONE 0.5 MG/1
10 TABLET ORAL ONCE
Refills: 0 | Status: COMPLETED | OUTPATIENT
Start: 2024-06-29 | End: 2024-06-29

## 2024-06-29 RX ADMIN — DEXAMETHASONE 10 MILLIGRAM(S): 0.5 TABLET ORAL at 10:39

## 2024-06-29 RX ADMIN — Medication 20 MILLIGRAM(S): at 10:53

## 2024-06-29 RX ADMIN — Medication 50 MILLIGRAM(S): at 10:40

## 2024-07-15 ENCOUNTER — APPOINTMENT (OUTPATIENT)
Dept: HEMATOLOGY ONCOLOGY | Facility: CLINIC | Age: 64
End: 2024-07-15

## 2024-07-26 ENCOUNTER — OUTPATIENT (OUTPATIENT)
Dept: OUTPATIENT SERVICES | Facility: HOSPITAL | Age: 64
LOS: 1 days | Discharge: HOME | End: 2024-07-26
Payer: COMMERCIAL

## 2024-07-26 ENCOUNTER — APPOINTMENT (OUTPATIENT)
Dept: VASCULAR SURGERY | Facility: CLINIC | Age: 64
End: 2024-07-26
Payer: COMMERCIAL

## 2024-07-26 VITALS — BODY MASS INDEX: 46.95 KG/M2 | WEIGHT: 275 LBS | HEIGHT: 64 IN

## 2024-07-26 DIAGNOSIS — Z98.890 OTHER SPECIFIED POSTPROCEDURAL STATES: Chronic | ICD-10-CM

## 2024-07-26 DIAGNOSIS — I89.0 LYMPHEDEMA, NOT ELSEWHERE CLASSIFIED: ICD-10-CM

## 2024-07-26 DIAGNOSIS — Z90.49 ACQUIRED ABSENCE OF OTHER SPECIFIED PARTS OF DIGESTIVE TRACT: Chronic | ICD-10-CM

## 2024-07-26 DIAGNOSIS — Z96.652 PRESENCE OF LEFT ARTIFICIAL KNEE JOINT: Chronic | ICD-10-CM

## 2024-07-26 DIAGNOSIS — Z90.710 ACQUIRED ABSENCE OF BOTH CERVIX AND UTERUS: Chronic | ICD-10-CM

## 2024-07-26 DIAGNOSIS — M79.89 OTHER SPECIFIED SOFT TISSUE DISORDERS: ICD-10-CM

## 2024-07-26 PROCEDURE — 99213 OFFICE O/P EST LOW 20 MIN: CPT

## 2024-07-26 PROCEDURE — 93970 EXTREMITY STUDY: CPT

## 2024-07-26 PROCEDURE — 97162 PT EVAL MOD COMPLEX 30 MIN: CPT | Mod: GP

## 2024-07-26 NOTE — HISTORY OF PRESENT ILLNESS
[FreeTextEntry1] : 65 y/o F with left leg venous ulcer that healed on unna boot therapy. Presents for bilateral leg swelling that she had yesterday.

## 2024-07-26 NOTE — ASSESSMENT
[FreeTextEntry1] : 63 y/o F with venous insufficiency, and lower extremity lymphedema, presents for leg swelling, concerned of DVT.  No evidence of DVT in the lower extremities on duplex today.  Pt reassured, advised to continue compression therapy, follow up with Lymphedema clinic as scheduled.

## 2024-07-27 DIAGNOSIS — I89.0 LYMPHEDEMA, NOT ELSEWHERE CLASSIFIED: ICD-10-CM

## 2024-07-30 ENCOUNTER — APPOINTMENT (OUTPATIENT)
Dept: BREAST CENTER | Facility: CLINIC | Age: 64
End: 2024-07-30
Payer: COMMERCIAL

## 2024-07-30 DIAGNOSIS — L30.4 ERYTHEMA INTERTRIGO: ICD-10-CM

## 2024-07-30 DIAGNOSIS — N64.4 MASTODYNIA: ICD-10-CM

## 2024-07-30 DIAGNOSIS — R92.8 OTHER ABNORMAL AND INCONCLUSIVE FINDINGS ON DIAGNOSTIC IMAGING OF BREAST: ICD-10-CM

## 2024-07-30 DIAGNOSIS — D05.10 INTRADUCTAL CARCINOMA IN SITU OF UNSPECIFIED BREAST: ICD-10-CM

## 2024-07-30 PROCEDURE — 99213 OFFICE O/P EST LOW 20 MIN: CPT

## 2024-07-30 RX ORDER — CLOTRIMAZOLE AND BETAMETHASONE DIPROPIONATE 10; .5 MG/G; MG/G
1-0.05 CREAM TOPICAL TWICE DAILY
Qty: 1 | Refills: 2 | Status: ACTIVE | COMMUNITY
Start: 2024-07-30 | End: 1900-01-01

## 2024-07-30 NOTE — ASSESSMENT
[FreeTextEntry1] : CORY RECIO is a 64-year-old female patient who presents today in follow acute visit with complaints of left breast pain  Most recent imaging: Right Uni Dx Mammo - 05/20/2024: -There are scattered areas of fibroglandular density. -No suspicious microcalcifications or areas of architectural distortion seen in the right breast.  -Stable right breast focal asymmetry noted. Short interval follow-up recommended. BI-RADS category 3: Probably Benign  On physical exam, no suspicious abnormalities were palpated although she does have bilateral nondiscrete nodularities throughout both breasts  In regards to her breast pain, it may be related to fibrocystic changes within her breast that are hormonally influenced. We spoke about possible interventions including evening primrose oil, supportive bras, and decreasing caffeine intake.  Although none of these have been consistently proven to improve breast pain, they may be tried.   At this time she will try supportive measures.  Due to her new symptoms of breast pain in the left breast we will order left dx mammo and US to rule out any pathology. We will call her with results and follow up plan   I spent a total of 20 minutes of face to face time with this patient, greater than 50% of which was spent in counseling and/or coordination of care. All of her questions were appropriately answered. She knows to call with any concerns.   PLAN: -left dx mammo and US now (we will call her with results and further plan)

## 2024-07-30 NOTE — HISTORY OF PRESENT ILLNESS
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   Estrogen receptor positive, 95 Progesterone receptor positive, 75  Left breast DCIS, micropapillary and solid types with comedo necrosis and calcifications, intermediate nuclear grade on stereotactic core biopsy 12/24/19; LUOQ, Anterior calcifications, Calcifications span 5 CM (tophat).  Estrogen receptor positive, 95 Progesterone receptor positive, 75  No prior complaints related to the breasts.  Her family history is significant for her maternal aunt with breast cancer in her 70's; her maternal first cousin with breast cancer in her 50's; her paternal first cousin with breast cancer in her 50's. 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  No genetic testing in the patient or her family.    s/p Left NLOC x 2 - 01/27/2020 = Two (2) healing prior bx sites with widespread DCIS), micropapillary and comedo types associated with calcifications, intermediate nuclear grade. -  AJCC 8th Edition Pathologic Stage: pTis(DCIS), pN0, pMx  Dr. Solo Joseph - Anastrozole Dr. Meliza Oswald - (+) RT to the left breast (03/16-04/06/2020)  CORY RECIO is a 61 year old female patient who presents today in follow up for left breast DCIS. Since her last visit, she has no new breast related complaints - she continues to have sporadic breast pain.   Imaging with a bilateral diagnostic mammogram and sonogram was done on 11/15/2021, which revealed there are scattered areas of fibroglandular density. The patient is status post a left lumpectomy with stable architectural distortion most consistent with postsurgical change.    Targeted Bilateral Breast Sonogram: Again identified at the left lumpectomy site which is at the 1-2 o'clock location 8 cm from the nipple is an oval circumscribed hyperechoic mass which is without significant change measuring 1.0 cm x 1.0 cm x 0.7 cm consistent with fat necrosis. BI-RADS Category 2: Benign  INTERVAL HISTORY 6/30/22 Cory is here for her six months follow up visit She has no breast related complaints at this time.  She denies any breast pain, has not palpated any new palpable masses in either breast and denies any nipple discharge or retraction.  Her imaging is as follows: 05/10/2022 left dx mammo and US -scattered areas of fibroglandular density. -status post a left lumpectomy with multiple calcifications in the region of the scar. These have a similar appearance to the calcifications seen prior to surgery. These span approximately 4.1 cm AP-->stereotactic guided biopsy is recommended. BI-RADS4  05/16/2022 LEFT stereotactic bx (mini-cork) -Benign atrophic fatty breast tissue with remote postsurgical site changes associated with stromal calcifications.   11/22/2022 -- CORY RECIO is a 62 year old female patient who presents today in follow up for left breast DCIS; BIRADS-3 imaging review. Since her last visit, she has no new breast related complaints.  Most recent imaging: B/L Dx Mammo & Right Breast Sono - 11/16/2022: -There are scattered areas of fibroglandular density. -The patient is status post a left lumpectomy with multiple stable previously biopsied calcifications in the region of the scar.  -Left-sided skin thickening is unchanged. -In the right breast, in the upper outer quadrant, there is an ovoid asymmetry which appears to be contiguous with the blood vessels likely reflecting a vascular dilatation. This is probably benign.  Targeted right Breast Sonogram: -No discrete abnormality is identified in the lateral aspect of the right breast. BI-RADS category 3: Probably Benign  She presents today for evaluation and imaging review.   05/24/2023 -- CORY RECIO is a 63 year old female patient who presents today in follow up for left breast DCIS; BIRADS-3 imaging review. She has had some sporadic left breast pain. Unfortunately, she suffered a traumatic accident to her left lower extremity in February 2023 and has been under the care of plastic surgery for wound care.  Most recent imaging: Right Uni Dx Mammo - 05/17/2023: -There are scattered areas of fibroglandular density. -Focal asymmetry in the upper outer quadrant of the right breast is likely vascular in etiology. This is stable since 11/16/2022 and likely benign. Continued follow-up is recommended to demonstrate stability. -Other stable benign asymmetries are seen in the right breast.  BI-RADS category 3: Probably Benign  She presents today for evaluation and imaging review.   12/01/2023 -- CORY RECIO is a 63 year old female patient who presents today in follow up for left breast DCIS; BIRADS-3 imaging review. She has had some sporadic left breast pain. Unfortunately, she suffered a traumatic accident to her left lower extremity in February 2023 and has been under the care of plastic surgery for wound care.  Most recent imaging: B/L Dx Mammo - 11/06/2023: -There are scattered areas of fibroglandular density. -Stable right breast focal asymmetry noted. Short interval follow-up recommended.  -Stable postsurgical changes noted in the left breast. BI-RADS category 3: Probably Benign  She presents today for evaluation and imaging review.   05/31/2024 -- CORY RECIO is a 64-year-old female patient who presents today in follow up for left breast DCIS; BIRADS-3 imaging review. She has had some sporadic left breast pain / sensitivity. Unfortunately, she suffered a traumatic accident to her left lower extremity in February 2023 and has been under the care of plastic surgery / vascular for wound care and lymphedema.   Most recent imaging: Right Uni Dx Mammo - 05/20/2024: -There are scattered areas of fibroglandular density. -No suspicious microcalcifications or areas of architectural distortion seen in the right breast.  -Stable right breast focal asymmetry noted. Short interval follow-up recommended. BI-RADS category 3: Probably Benign  INTERVAL HISTORY 7/30/24 Cory is here for acute visit. She states she has left breast pain but denies any other breast related complaints

## 2024-08-08 NOTE — H&P PST ADULT - NS PRO AD NO ADVANCE DIRECTIVE
Post surgical follow up call placed to patient.     Pain management - taking tylenol daily which is effective  Activity - able to ambulate without difficulty   Diet - tolerating solids and liquids, no reports of n/v  Bowel movement - passing flatus, small BM this morning  Incision site - no s/s of infection reported, closed with skin glue. SP drain site dry. Patient can cover with bandaide vs gauze/tape  Gomez catheter - patent, light pink urine, patient reports being a little sore at the penis when going to a seated position. He is encouraged to put some lubricant at the tip to help with irritation.    Patient will follow up as scheduled 8/13 @ 8am      No

## 2024-08-09 ENCOUNTER — RESULT REVIEW (OUTPATIENT)
Age: 64
End: 2024-08-09

## 2024-08-09 ENCOUNTER — OUTPATIENT (OUTPATIENT)
Dept: OUTPATIENT SERVICES | Facility: HOSPITAL | Age: 64
LOS: 1 days | End: 2024-08-09
Payer: COMMERCIAL

## 2024-08-09 DIAGNOSIS — Z98.890 OTHER SPECIFIED POSTPROCEDURAL STATES: Chronic | ICD-10-CM

## 2024-08-09 DIAGNOSIS — Z90.49 ACQUIRED ABSENCE OF OTHER SPECIFIED PARTS OF DIGESTIVE TRACT: Chronic | ICD-10-CM

## 2024-08-09 DIAGNOSIS — Z96.652 PRESENCE OF LEFT ARTIFICIAL KNEE JOINT: Chronic | ICD-10-CM

## 2024-08-09 DIAGNOSIS — R92.8 OTHER ABNORMAL AND INCONCLUSIVE FINDINGS ON DIAGNOSTIC IMAGING OF BREAST: ICD-10-CM

## 2024-08-09 DIAGNOSIS — N63.10 UNSPECIFIED LUMP IN THE RIGHT BREAST, UNSPECIFIED QUADRANT: ICD-10-CM

## 2024-08-09 PROCEDURE — G0279: CPT

## 2024-08-09 PROCEDURE — 76641 ULTRASOUND BREAST COMPLETE: CPT | Mod: LT

## 2024-08-09 PROCEDURE — 77065 DX MAMMO INCL CAD UNI: CPT | Mod: 26,LT

## 2024-08-09 PROCEDURE — G0279: CPT | Mod: 26

## 2024-08-09 PROCEDURE — 77065 DX MAMMO INCL CAD UNI: CPT | Mod: LT

## 2024-08-09 PROCEDURE — 77061 BREAST TOMOSYNTHESIS UNI: CPT | Mod: 26,LT

## 2024-08-09 PROCEDURE — 76641 ULTRASOUND BREAST COMPLETE: CPT | Mod: 26,LT

## 2024-08-10 DIAGNOSIS — N63.10 UNSPECIFIED LUMP IN THE RIGHT BREAST, UNSPECIFIED QUADRANT: ICD-10-CM

## 2024-08-10 DIAGNOSIS — R92.8 OTHER ABNORMAL AND INCONCLUSIVE FINDINGS ON DIAGNOSTIC IMAGING OF BREAST: ICD-10-CM

## 2024-10-04 ENCOUNTER — OUTPATIENT (OUTPATIENT)
Dept: OUTPATIENT SERVICES | Facility: HOSPITAL | Age: 64
LOS: 1 days | End: 2024-10-04
Payer: COMMERCIAL

## 2024-10-04 DIAGNOSIS — Z90.710 ACQUIRED ABSENCE OF BOTH CERVIX AND UTERUS: Chronic | ICD-10-CM

## 2024-10-04 DIAGNOSIS — Z98.890 OTHER SPECIFIED POSTPROCEDURAL STATES: Chronic | ICD-10-CM

## 2024-10-04 DIAGNOSIS — I89.0 LYMPHEDEMA, NOT ELSEWHERE CLASSIFIED: ICD-10-CM

## 2024-10-04 DIAGNOSIS — Z90.49 ACQUIRED ABSENCE OF OTHER SPECIFIED PARTS OF DIGESTIVE TRACT: Chronic | ICD-10-CM

## 2024-10-04 DIAGNOSIS — Z96.652 PRESENCE OF LEFT ARTIFICIAL KNEE JOINT: Chronic | ICD-10-CM

## 2024-10-04 PROCEDURE — 97110 THERAPEUTIC EXERCISES: CPT | Mod: GP

## 2024-10-04 PROCEDURE — 97140 MANUAL THERAPY 1/> REGIONS: CPT | Mod: GP

## 2024-10-05 DIAGNOSIS — I89.0 LYMPHEDEMA, NOT ELSEWHERE CLASSIFIED: ICD-10-CM

## 2024-10-31 ENCOUNTER — OUTPATIENT (OUTPATIENT)
Dept: OUTPATIENT SERVICES | Facility: HOSPITAL | Age: 64
LOS: 1 days | End: 2024-10-31
Payer: COMMERCIAL

## 2024-10-31 ENCOUNTER — APPOINTMENT (OUTPATIENT)
Age: 64
End: 2024-10-31
Payer: COMMERCIAL

## 2024-10-31 VITALS
HEART RATE: 77 BPM | DIASTOLIC BLOOD PRESSURE: 85 MMHG | TEMPERATURE: 97.7 F | RESPIRATION RATE: 16 BRPM | SYSTOLIC BLOOD PRESSURE: 167 MMHG | HEIGHT: 64 IN

## 2024-10-31 DIAGNOSIS — Z96.652 PRESENCE OF LEFT ARTIFICIAL KNEE JOINT: Chronic | ICD-10-CM

## 2024-10-31 DIAGNOSIS — D05.10 INTRADUCTAL CARCINOMA IN SITU OF UNSPECIFIED BREAST: ICD-10-CM

## 2024-10-31 DIAGNOSIS — Z90.49 ACQUIRED ABSENCE OF OTHER SPECIFIED PARTS OF DIGESTIVE TRACT: Chronic | ICD-10-CM

## 2024-10-31 DIAGNOSIS — Z79.811 LONG TERM (CURRENT) USE OF AROMATASE INHIBITORS: ICD-10-CM

## 2024-10-31 DIAGNOSIS — Z51.81 ENCOUNTER FOR THERAPEUTIC DRUG LVL MONITORING: ICD-10-CM

## 2024-10-31 DIAGNOSIS — Z98.890 OTHER SPECIFIED POSTPROCEDURAL STATES: Chronic | ICD-10-CM

## 2024-10-31 DIAGNOSIS — Z90.710 ACQUIRED ABSENCE OF BOTH CERVIX AND UTERUS: Chronic | ICD-10-CM

## 2024-10-31 DIAGNOSIS — Z79.811 ENCOUNTER FOR THERAPEUTIC DRUG LVL MONITORING: ICD-10-CM

## 2024-10-31 PROCEDURE — 99213 OFFICE O/P EST LOW 20 MIN: CPT

## 2024-11-01 DIAGNOSIS — D05.10 INTRADUCTAL CARCINOMA IN SITU OF UNSPECIFIED BREAST: ICD-10-CM

## 2024-11-05 ENCOUNTER — APPOINTMENT (OUTPATIENT)
Dept: PLASTIC SURGERY | Facility: CLINIC | Age: 64
End: 2024-11-05
Payer: COMMERCIAL

## 2024-11-05 PROCEDURE — G2211 COMPLEX E/M VISIT ADD ON: CPT | Mod: NC

## 2024-11-05 PROCEDURE — 99212 OFFICE O/P EST SF 10 MIN: CPT

## 2024-11-09 ENCOUNTER — RESULT REVIEW (OUTPATIENT)
Age: 64
End: 2024-11-09

## 2024-11-09 ENCOUNTER — OUTPATIENT (OUTPATIENT)
Dept: OUTPATIENT SERVICES | Facility: HOSPITAL | Age: 64
LOS: 1 days | End: 2024-11-09
Payer: COMMERCIAL

## 2024-11-09 DIAGNOSIS — Z98.890 OTHER SPECIFIED POSTPROCEDURAL STATES: Chronic | ICD-10-CM

## 2024-11-09 DIAGNOSIS — Z96.652 PRESENCE OF LEFT ARTIFICIAL KNEE JOINT: Chronic | ICD-10-CM

## 2024-11-09 DIAGNOSIS — Z90.49 ACQUIRED ABSENCE OF OTHER SPECIFIED PARTS OF DIGESTIVE TRACT: Chronic | ICD-10-CM

## 2024-11-09 DIAGNOSIS — Z00.8 ENCOUNTER FOR OTHER GENERAL EXAMINATION: ICD-10-CM

## 2024-11-09 DIAGNOSIS — Z13.820 ENCOUNTER FOR SCREENING FOR OSTEOPOROSIS: ICD-10-CM

## 2024-11-09 DIAGNOSIS — Z90.710 ACQUIRED ABSENCE OF BOTH CERVIX AND UTERUS: Chronic | ICD-10-CM

## 2024-11-09 PROCEDURE — 77080 DXA BONE DENSITY AXIAL: CPT | Mod: 26

## 2024-11-09 PROCEDURE — 77080 DXA BONE DENSITY AXIAL: CPT

## 2024-11-10 DIAGNOSIS — Z13.820 ENCOUNTER FOR SCREENING FOR OSTEOPOROSIS: ICD-10-CM

## 2024-11-16 ENCOUNTER — RESULT REVIEW (OUTPATIENT)
Age: 64
End: 2024-11-16

## 2024-11-16 ENCOUNTER — OUTPATIENT (OUTPATIENT)
Dept: OUTPATIENT SERVICES | Facility: HOSPITAL | Age: 64
LOS: 1 days | End: 2024-11-16
Payer: COMMERCIAL

## 2024-11-16 DIAGNOSIS — Z12.31 ENCOUNTER FOR SCREENING MAMMOGRAM FOR MALIGNANT NEOPLASM OF BREAST: ICD-10-CM

## 2024-11-16 DIAGNOSIS — R92.8 OTHER ABNORMAL AND INCONCLUSIVE FINDINGS ON DIAGNOSTIC IMAGING OF BREAST: ICD-10-CM

## 2024-11-16 DIAGNOSIS — Z98.890 OTHER SPECIFIED POSTPROCEDURAL STATES: Chronic | ICD-10-CM

## 2024-11-16 DIAGNOSIS — Z90.49 ACQUIRED ABSENCE OF OTHER SPECIFIED PARTS OF DIGESTIVE TRACT: Chronic | ICD-10-CM

## 2024-11-16 DIAGNOSIS — Z90.710 ACQUIRED ABSENCE OF BOTH CERVIX AND UTERUS: Chronic | ICD-10-CM

## 2024-11-16 DIAGNOSIS — Z96.652 PRESENCE OF LEFT ARTIFICIAL KNEE JOINT: Chronic | ICD-10-CM

## 2024-11-16 PROCEDURE — 77066 DX MAMMO INCL CAD BI: CPT

## 2024-11-16 PROCEDURE — G0279: CPT

## 2024-11-16 PROCEDURE — 77062 BREAST TOMOSYNTHESIS BI: CPT | Mod: 26

## 2024-11-16 PROCEDURE — G0279: CPT | Mod: 26

## 2024-11-16 PROCEDURE — 77066 DX MAMMO INCL CAD BI: CPT | Mod: 26

## 2024-11-17 DIAGNOSIS — R92.8 OTHER ABNORMAL AND INCONCLUSIVE FINDINGS ON DIAGNOSTIC IMAGING OF BREAST: ICD-10-CM

## 2024-11-19 NOTE — ED ADULT NURSE NOTE - PRO INTERPRETER NEED 2
Detail Level: Zone Laser Recommendations: Informed patient that laser treatment could be used for rosacea which would not be covered by insurance. We will have patient check with . Explained that it may take more than one treatment. Sunscreen Recommendations: Discussed importance of photo protection with sun screen and photo protective clothing English

## 2024-11-20 ENCOUNTER — NON-APPOINTMENT (OUTPATIENT)
Age: 64
End: 2024-11-20

## 2024-12-08 ENCOUNTER — NON-APPOINTMENT (OUTPATIENT)
Age: 64
End: 2024-12-08

## 2024-12-09 NOTE — H&P ADULT - NSICDXFAMILYHX_GEN_ALL_CORE_FT
Dr. Hemphill 's office will be faxing pts last ov notes/ pulm referral/ they will reach out to have pt call to schedule.     Advised $ 30.00 min self pay payment to take advantage of the 50% discount.   FAMILY HISTORY:  Family history of early CAD  Family history of renal disease  FH: senile dementia

## 2024-12-10 ENCOUNTER — APPOINTMENT (OUTPATIENT)
Dept: VASCULAR SURGERY | Facility: CLINIC | Age: 64
End: 2024-12-10
Payer: COMMERCIAL

## 2024-12-10 VITALS — WEIGHT: 275 LBS | BODY MASS INDEX: 46.95 KG/M2 | HEIGHT: 64 IN

## 2024-12-10 PROBLEM — I87.2 CHRONIC VENOUS INSUFFICIENCY: Status: ACTIVE | Noted: 2024-12-10

## 2024-12-10 PROCEDURE — 99212 OFFICE O/P EST SF 10 MIN: CPT

## 2024-12-13 ENCOUNTER — APPOINTMENT (OUTPATIENT)
Dept: BREAST CENTER | Facility: CLINIC | Age: 64
End: 2024-12-13
Payer: COMMERCIAL

## 2024-12-13 VITALS
WEIGHT: 283 LBS | BODY MASS INDEX: 48.32 KG/M2 | DIASTOLIC BLOOD PRESSURE: 93 MMHG | SYSTOLIC BLOOD PRESSURE: 167 MMHG | HEART RATE: 76 BPM | HEIGHT: 64 IN

## 2024-12-13 PROCEDURE — 99214 OFFICE O/P EST MOD 30 MIN: CPT

## 2024-12-19 ENCOUNTER — APPOINTMENT (OUTPATIENT)
Dept: SURGERY | Facility: CLINIC | Age: 64
End: 2024-12-19

## 2024-12-19 VITALS
HEIGHT: 62 IN | WEIGHT: 286 LBS | TEMPERATURE: 97 F | OXYGEN SATURATION: 95 % | SYSTOLIC BLOOD PRESSURE: 132 MMHG | HEART RATE: 76 BPM | BODY MASS INDEX: 52.63 KG/M2 | DIASTOLIC BLOOD PRESSURE: 86 MMHG

## 2024-12-19 DIAGNOSIS — J32.9 CHRONIC SINUSITIS, UNSPECIFIED: ICD-10-CM

## 2024-12-19 DIAGNOSIS — Z87.898 PERSONAL HISTORY OF OTHER SPECIFIED CONDITIONS: ICD-10-CM
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
## 2024-12-19 DIAGNOSIS — L97.929 VARICOSE VEINS OF LEFT LOWER EXTREMITY WITH ULCER OF UNSPECIFIED SITE: ICD-10-CM

## 2024-12-19 DIAGNOSIS — Z87.42 PERSONAL HISTORY OF OTHER DISEASES OF THE FEMALE GENITAL TRACT: ICD-10-CM

## 2024-12-19 DIAGNOSIS — M79.2 NEURALGIA AND NEURITIS, UNSPECIFIED: ICD-10-CM

## 2024-12-19 DIAGNOSIS — L30.4 ERYTHEMA INTERTRIGO: ICD-10-CM

## 2024-12-19 DIAGNOSIS — T84.84XA PAIN DUE TO INTERNAL ORTHOPEDIC PROSTHETIC DEVICES, IMPLANTS AND GRAFTS, INITIAL ENCOUNTER: ICD-10-CM

## 2024-12-19 DIAGNOSIS — Z85.850 PERSONAL HISTORY OF MALIGNANT NEOPLASM OF THYROID: ICD-10-CM

## 2024-12-19 DIAGNOSIS — M79.661 PAIN IN RIGHT LOWER LEG: ICD-10-CM

## 2024-12-19 DIAGNOSIS — S81.802A UNSPECIFIED OPEN WOUND, LEFT LOWER LEG, INITIAL ENCOUNTER: ICD-10-CM

## 2024-12-19 DIAGNOSIS — E88.810 METABOLIC SYNDROME: ICD-10-CM

## 2024-12-19 DIAGNOSIS — M17.11 UNILATERAL PRIMARY OSTEOARTHRITIS, RIGHT KNEE: ICD-10-CM

## 2024-12-19 DIAGNOSIS — Z86.718 PERSONAL HISTORY OF OTHER VENOUS THROMBOSIS AND EMBOLISM: ICD-10-CM

## 2024-12-19 DIAGNOSIS — K76.0 FATTY (CHANGE OF) LIVER, NOT ELSEWHERE CLASSIFIED: ICD-10-CM

## 2024-12-19 DIAGNOSIS — Z96.652 PAIN DUE TO INTERNAL ORTHOPEDIC PROSTHETIC DEVICES, IMPLANTS AND GRAFTS, INITIAL ENCOUNTER: ICD-10-CM

## 2024-12-19 DIAGNOSIS — T50.905A ABNORMAL WEIGHT GAIN: ICD-10-CM

## 2024-12-19 DIAGNOSIS — E66.813 OBESITY, CLASS 3: ICD-10-CM

## 2024-12-19 DIAGNOSIS — E78.5 HYPERLIPIDEMIA, UNSPECIFIED: ICD-10-CM

## 2024-12-19 DIAGNOSIS — M79.605 PAIN IN RIGHT LEG: ICD-10-CM

## 2024-12-19 DIAGNOSIS — R23.4 CHANGES IN SKIN TEXTURE: ICD-10-CM

## 2024-12-19 DIAGNOSIS — F41.9 ANXIETY DISORDER, UNSPECIFIED: ICD-10-CM

## 2024-12-19 DIAGNOSIS — L03.116 CELLULITIS OF LEFT LOWER LIMB: ICD-10-CM

## 2024-12-19 DIAGNOSIS — M25.561 PAIN IN RIGHT KNEE: ICD-10-CM

## 2024-12-19 DIAGNOSIS — Z51.81 ENCOUNTER FOR THERAPEUTIC DRUG LVL MONITORING: ICD-10-CM

## 2024-12-19 DIAGNOSIS — Z85.828 PERSONAL HISTORY OF OTHER MALIGNANT NEOPLASM OF SKIN: ICD-10-CM

## 2024-12-19 DIAGNOSIS — M79.89 OTHER SPECIFIED SOFT TISSUE DISORDERS: ICD-10-CM

## 2024-12-19 DIAGNOSIS — N64.4 MASTODYNIA: ICD-10-CM

## 2024-12-19 DIAGNOSIS — Z92.3 PERSONAL HISTORY OF IRRADIATION: ICD-10-CM

## 2024-12-19 DIAGNOSIS — M54.50 LOW BACK PAIN, UNSPECIFIED: ICD-10-CM

## 2024-12-19 DIAGNOSIS — I83.029 VARICOSE VEINS OF LEFT LOWER EXTREMITY WITH ULCER OF UNSPECIFIED SITE: ICD-10-CM

## 2024-12-19 DIAGNOSIS — G43.909 MIGRAINE, UNSPECIFIED, NOT INTRACTABLE, W/OUT STATUS MIGRAINOSUS: ICD-10-CM

## 2024-12-19 DIAGNOSIS — M79.604 PAIN IN RIGHT LEG: ICD-10-CM

## 2024-12-19 DIAGNOSIS — Z01.419 ENCOUNTER FOR GYNECOLOGICAL EXAMINATION (GENERAL) (ROUTINE) W/OUT ABNORMAL FINDINGS: ICD-10-CM

## 2024-12-19 DIAGNOSIS — R21 RASH AND OTHER NONSPECIFIC SKIN ERUPTION: ICD-10-CM

## 2024-12-19 DIAGNOSIS — K58.0 IRRITABLE BOWEL SYNDROME WITH DIARRHEA: ICD-10-CM

## 2024-12-19 DIAGNOSIS — Z79.811 PERSONAL HISTORY OF ANTINEOPLASTIC CHEMOTHERAPY: ICD-10-CM

## 2024-12-19 DIAGNOSIS — D05.10 INTRADUCTAL CARCINOMA IN SITU OF UNSPECIFIED BREAST: ICD-10-CM

## 2024-12-19 DIAGNOSIS — Z62.9 PROBLEM RELATED TO UPBRINGING, UNSPECIFIED: ICD-10-CM

## 2024-12-19 DIAGNOSIS — Z92.21 PERSONAL HISTORY OF ANTINEOPLASTIC CHEMOTHERAPY: ICD-10-CM

## 2024-12-19 DIAGNOSIS — I89.0 LYMPHEDEMA, NOT ELSEWHERE CLASSIFIED: ICD-10-CM

## 2024-12-19 DIAGNOSIS — R92.8 OTHER ABNORMAL AND INCONCLUSIVE FINDINGS ON DIAGNOSTIC IMAGING OF BREAST: ICD-10-CM

## 2024-12-19 DIAGNOSIS — R63.5 ABNORMAL WEIGHT GAIN: ICD-10-CM

## 2024-12-19 DIAGNOSIS — Z85.820 PERSONAL HISTORY OF MALIGNANT MELANOMA OF SKIN: ICD-10-CM

## 2024-12-19 DIAGNOSIS — I10 ESSENTIAL (PRIMARY) HYPERTENSION: ICD-10-CM

## 2024-12-19 DIAGNOSIS — Z79.811 LONG TERM (CURRENT) USE OF AROMATASE INHIBITORS: ICD-10-CM

## 2024-12-19 DIAGNOSIS — M67.88 OTHER SPECIFIED DISORDERS OF SYNOVIUM AND TENDON, OTHER SITE: ICD-10-CM

## 2024-12-19 DIAGNOSIS — I87.2 VENOUS INSUFFICIENCY (CHRONIC) (PERIPHERAL): ICD-10-CM

## 2024-12-19 DIAGNOSIS — Z79.811 ENCOUNTER FOR THERAPEUTIC DRUG LVL MONITORING: ICD-10-CM

## 2024-12-19 DIAGNOSIS — Z86.39 PERSONAL HISTORY OF OTHER ENDOCRINE, NUTRITIONAL AND METABOLIC DISEASE: ICD-10-CM

## 2024-12-19 DIAGNOSIS — Z86.03 PERSONAL HISTORY OF NEOPLASM OF UNCERTAIN BEHAVIOR: ICD-10-CM

## 2024-12-19 DIAGNOSIS — M79.89 PAIN IN RIGHT LOWER LEG: ICD-10-CM

## 2024-12-19 DIAGNOSIS — K21.9 GASTRO-ESOPHAGEAL REFLUX DISEASE W/OUT ESOPHAGITIS: ICD-10-CM

## 2024-12-19 PROCEDURE — G2211 COMPLEX E/M VISIT ADD ON: CPT | Mod: NC

## 2024-12-19 PROCEDURE — 99205 OFFICE O/P NEW HI 60 MIN: CPT

## 2024-12-19 RX ORDER — FAMOTIDINE 40 MG/1
40 TABLET, FILM COATED ORAL TWICE DAILY
Refills: 0 | Status: ACTIVE | COMMUNITY

## 2024-12-19 SDOH — HEALTH STABILITY - MENTAL HEALTH: PROBLEM RELATED TO UPBRINGING, UNSPECIFIED: Z62.9

## 2025-01-18 PROBLEM — Z87.891 FORMER SMOKER: Status: ACTIVE | Noted: 2025-01-18

## 2025-01-18 PROBLEM — Z82.49 FAMILY HISTORY OF CARDIAC DISORDER: Status: ACTIVE | Noted: 2025-01-18

## 2025-01-18 PROBLEM — Z83.49 FAMILY HISTORY OF THYROID DISEASE: Status: ACTIVE | Noted: 2025-01-18

## 2025-01-18 PROBLEM — Z82.0 FAMILY HISTORY OF ALZHEIMER'S DISEASE: Status: ACTIVE | Noted: 2025-01-18

## 2025-01-18 PROBLEM — Z83.3 FAMILY HISTORY OF TYPE 2 DIABETES MELLITUS: Status: ACTIVE | Noted: 2025-01-18

## 2025-01-18 PROBLEM — Z83.438 FAMILY HISTORY OF HYPERLIPIDEMIA: Status: ACTIVE | Noted: 2025-01-18

## 2025-01-18 PROBLEM — Z80.3 FAMILY HISTORY OF MALIGNANT NEOPLASM OF BREAST: Status: ACTIVE | Noted: 2025-01-18

## 2025-01-18 PROBLEM — Z82.0 FAMILY HISTORY OF TIAS: Status: ACTIVE | Noted: 2025-01-18

## 2025-01-18 PROBLEM — Z83.49 FAMILY HISTORY OF OBESITY: Status: ACTIVE | Noted: 2025-01-18

## 2025-01-18 PROBLEM — Z82.49 FAMILY HISTORY OF HEART FAILURE: Status: ACTIVE | Noted: 2025-01-18

## 2025-01-27 ENCOUNTER — NON-APPOINTMENT (OUTPATIENT)
Age: 65
End: 2025-01-27

## 2025-01-27 ENCOUNTER — APPOINTMENT (OUTPATIENT)
Dept: OBGYN | Facility: CLINIC | Age: 65
End: 2025-01-27
Payer: COMMERCIAL

## 2025-01-27 ENCOUNTER — OUTPATIENT (OUTPATIENT)
Dept: OUTPATIENT SERVICES | Facility: HOSPITAL | Age: 65
LOS: 1 days | End: 2025-01-27
Payer: COMMERCIAL

## 2025-01-27 VITALS
SYSTOLIC BLOOD PRESSURE: 122 MMHG | HEIGHT: 62 IN | BODY MASS INDEX: 53.92 KG/M2 | WEIGHT: 293 LBS | DIASTOLIC BLOOD PRESSURE: 84 MMHG

## 2025-01-27 DIAGNOSIS — Z98.890 OTHER SPECIFIED POSTPROCEDURAL STATES: Chronic | ICD-10-CM

## 2025-01-27 DIAGNOSIS — Z01.419 ENCOUNTER FOR GYNECOLOGICAL EXAMINATION (GENERAL) (ROUTINE) WITHOUT ABNORMAL FINDINGS: ICD-10-CM

## 2025-01-27 DIAGNOSIS — Z96.652 PRESENCE OF LEFT ARTIFICIAL KNEE JOINT: Chronic | ICD-10-CM

## 2025-01-27 DIAGNOSIS — Z01.419 ENCOUNTER FOR GYNECOLOGICAL EXAMINATION (GENERAL) (ROUTINE) W/OUT ABNORMAL FINDINGS: ICD-10-CM

## 2025-01-27 DIAGNOSIS — Z90.49 ACQUIRED ABSENCE OF OTHER SPECIFIED PARTS OF DIGESTIVE TRACT: Chronic | ICD-10-CM

## 2025-01-27 PROCEDURE — 99396 PREV VISIT EST AGE 40-64: CPT

## 2025-01-27 PROCEDURE — 99459 PELVIC EXAMINATION: CPT

## 2025-01-28 DIAGNOSIS — Z01.419 ENCOUNTER FOR GYNECOLOGICAL EXAMINATION (GENERAL) (ROUTINE) WITHOUT ABNORMAL FINDINGS: ICD-10-CM

## 2025-02-07 ENCOUNTER — APPOINTMENT (OUTPATIENT)
Dept: SURGERY | Facility: CLINIC | Age: 65
End: 2025-02-07
Payer: COMMERCIAL

## 2025-02-07 PROCEDURE — 97802 MEDICAL NUTRITION INDIV IN: CPT | Mod: 95

## 2025-02-10 VITALS — WEIGHT: 291 LBS | HEIGHT: 62 IN | BODY MASS INDEX: 53.55 KG/M2

## 2025-02-11 ENCOUNTER — APPOINTMENT (OUTPATIENT)
Dept: VASCULAR SURGERY | Facility: CLINIC | Age: 65
End: 2025-02-11
Payer: COMMERCIAL

## 2025-02-11 VITALS — WEIGHT: 291 LBS | BODY MASS INDEX: 53.55 KG/M2 | HEIGHT: 62 IN

## 2025-02-11 DIAGNOSIS — M79.89 OTHER SPECIFIED SOFT TISSUE DISORDERS: ICD-10-CM

## 2025-02-11 DIAGNOSIS — I89.0 LYMPHEDEMA, NOT ELSEWHERE CLASSIFIED: ICD-10-CM

## 2025-02-11 PROCEDURE — 93970 EXTREMITY STUDY: CPT

## 2025-02-11 PROCEDURE — 99212 OFFICE O/P EST SF 10 MIN: CPT

## 2025-02-14 ENCOUNTER — APPOINTMENT (OUTPATIENT)
Dept: SURGERY | Facility: CLINIC | Age: 65
End: 2025-02-14

## 2025-02-14 ENCOUNTER — APPOINTMENT (OUTPATIENT)
Dept: VASCULAR SURGERY | Facility: CLINIC | Age: 65
End: 2025-02-14
Payer: COMMERCIAL

## 2025-02-14 VITALS
OXYGEN SATURATION: 97 % | HEART RATE: 73 BPM | WEIGHT: 290 LBS | DIASTOLIC BLOOD PRESSURE: 80 MMHG | HEIGHT: 62 IN | SYSTOLIC BLOOD PRESSURE: 138 MMHG | BODY MASS INDEX: 53.37 KG/M2

## 2025-02-14 DIAGNOSIS — R60.0 LOCALIZED EDEMA: ICD-10-CM

## 2025-02-14 PROCEDURE — 29580 STRAPPING UNNA BOOT: CPT | Mod: LT

## 2025-02-14 PROCEDURE — G2211 COMPLEX E/M VISIT ADD ON: CPT | Mod: NC

## 2025-02-14 PROCEDURE — 99214 OFFICE O/P EST MOD 30 MIN: CPT

## 2025-03-06 ENCOUNTER — OUTPATIENT (OUTPATIENT)
Dept: OUTPATIENT SERVICES | Facility: HOSPITAL | Age: 65
LOS: 1 days | End: 2025-03-06
Payer: COMMERCIAL

## 2025-03-06 DIAGNOSIS — Z90.710 ACQUIRED ABSENCE OF BOTH CERVIX AND UTERUS: Chronic | ICD-10-CM

## 2025-03-06 DIAGNOSIS — Z00.8 ENCOUNTER FOR OTHER GENERAL EXAMINATION: ICD-10-CM

## 2025-03-06 DIAGNOSIS — Z98.890 OTHER SPECIFIED POSTPROCEDURAL STATES: Chronic | ICD-10-CM

## 2025-03-06 DIAGNOSIS — Z96.652 PRESENCE OF LEFT ARTIFICIAL KNEE JOINT: Chronic | ICD-10-CM

## 2025-03-06 DIAGNOSIS — Z90.49 ACQUIRED ABSENCE OF OTHER SPECIFIED PARTS OF DIGESTIVE TRACT: Chronic | ICD-10-CM

## 2025-03-06 PROCEDURE — 76700 US EXAM ABDOM COMPLETE: CPT

## 2025-03-06 PROCEDURE — 76700 US EXAM ABDOM COMPLETE: CPT | Mod: 26

## 2025-03-10 NOTE — ED ADULT TRIAGE NOTE - BSA (M2)
- Robitussin or delsym for cough suppression as needed  - Mucinex or sudafed for nasal congestion as needed  - Tylenol or motrin for pain or fever.  - Saline nasal sprays as needed    Please call, return to the office, or go the ER if there are any worsening symptoms or if you have any other questions or concerns.     2.12

## 2025-03-21 PROBLEM — K76.0 FATTY LIVER: Status: ACTIVE | Noted: 2025-03-21

## 2025-04-09 ENCOUNTER — APPOINTMENT (OUTPATIENT)
Dept: SURGERY | Facility: CLINIC | Age: 65
End: 2025-04-09
Payer: COMMERCIAL

## 2025-04-09 VITALS — WEIGHT: 288 LBS | BODY MASS INDEX: 53 KG/M2 | HEIGHT: 62 IN

## 2025-04-09 PROCEDURE — 97803 MED NUTRITION INDIV SUBSEQ: CPT | Mod: 95

## 2025-04-29 ENCOUNTER — APPOINTMENT (OUTPATIENT)
Dept: PLASTIC SURGERY | Facility: CLINIC | Age: 65
End: 2025-04-29
Payer: COMMERCIAL

## 2025-04-29 DIAGNOSIS — Z94.5 SKIN TRANSPLANT STATUS: ICD-10-CM

## 2025-04-29 DIAGNOSIS — L72.9 FOLLICULAR CYST OF THE SKIN AND SUBCUTANEOUS TISSUE, UNSPECIFIED: ICD-10-CM

## 2025-04-29 DIAGNOSIS — L08.9 FOLLICULAR CYST OF THE SKIN AND SUBCUTANEOUS TISSUE, UNSPECIFIED: ICD-10-CM

## 2025-04-29 PROCEDURE — 99213 OFFICE O/P EST LOW 20 MIN: CPT

## 2025-04-29 RX ORDER — DOXYCYCLINE HYCLATE 100 MG/1
100 TABLET ORAL
Qty: 14 | Refills: 0 | Status: ACTIVE | COMMUNITY
Start: 2025-04-29 | End: 1900-01-01

## 2025-05-09 ENCOUNTER — APPOINTMENT (OUTPATIENT)
Dept: SURGERY | Facility: CLINIC | Age: 65
End: 2025-05-09

## 2025-05-09 VITALS
WEIGHT: 291 LBS | TEMPERATURE: 97 F | SYSTOLIC BLOOD PRESSURE: 142 MMHG | OXYGEN SATURATION: 97 % | DIASTOLIC BLOOD PRESSURE: 88 MMHG | BODY MASS INDEX: 53.55 KG/M2 | HEART RATE: 70 BPM | HEIGHT: 62 IN

## 2025-05-09 PROCEDURE — G2211 COMPLEX E/M VISIT ADD ON: CPT | Mod: NC

## 2025-05-09 PROCEDURE — 99215 OFFICE O/P EST HI 40 MIN: CPT

## 2025-05-14 ENCOUNTER — OUTPATIENT (OUTPATIENT)
Dept: OUTPATIENT SERVICES | Facility: HOSPITAL | Age: 65
LOS: 1 days | End: 2025-05-14
Payer: COMMERCIAL

## 2025-05-14 ENCOUNTER — APPOINTMENT (OUTPATIENT)
Age: 65
End: 2025-05-14
Payer: COMMERCIAL

## 2025-05-14 VITALS
WEIGHT: 291 LBS | DIASTOLIC BLOOD PRESSURE: 83 MMHG | BODY MASS INDEX: 53.55 KG/M2 | SYSTOLIC BLOOD PRESSURE: 138 MMHG | HEART RATE: 57 BPM | RESPIRATION RATE: 19 BRPM | OXYGEN SATURATION: 99 % | HEIGHT: 62 IN | TEMPERATURE: 98 F

## 2025-05-14 DIAGNOSIS — Z98.890 OTHER SPECIFIED POSTPROCEDURAL STATES: Chronic | ICD-10-CM

## 2025-05-14 DIAGNOSIS — D05.10 INTRADUCTAL CARCINOMA IN SITU OF UNSPECIFIED BREAST: ICD-10-CM

## 2025-05-14 DIAGNOSIS — Z51.81 ENCOUNTER FOR THERAPEUTIC DRUG LVL MONITORING: ICD-10-CM

## 2025-05-14 DIAGNOSIS — Z96.652 PRESENCE OF LEFT ARTIFICIAL KNEE JOINT: Chronic | ICD-10-CM

## 2025-05-14 DIAGNOSIS — R76.0 RAISED ANTIBODY TITER: ICD-10-CM

## 2025-05-14 DIAGNOSIS — Z90.710 ACQUIRED ABSENCE OF BOTH CERVIX AND UTERUS: Chronic | ICD-10-CM

## 2025-05-14 DIAGNOSIS — Z90.49 ACQUIRED ABSENCE OF OTHER SPECIFIED PARTS OF DIGESTIVE TRACT: Chronic | ICD-10-CM

## 2025-05-14 DIAGNOSIS — Z86.718 PERSONAL HISTORY OF OTHER VENOUS THROMBOSIS AND EMBOLISM: ICD-10-CM

## 2025-05-14 DIAGNOSIS — Z79.811 ENCOUNTER FOR THERAPEUTIC DRUG LVL MONITORING: ICD-10-CM

## 2025-05-14 PROCEDURE — 99214 OFFICE O/P EST MOD 30 MIN: CPT

## 2025-05-15 DIAGNOSIS — D05.10 INTRADUCTAL CARCINOMA IN SITU OF UNSPECIFIED BREAST: ICD-10-CM

## 2025-05-16 LAB
ALBUMIN SERPL ELPH-MCNC: 4.2 G/DL
ALP BLD-CCNC: 130 U/L
ALT SERPL-CCNC: 29 U/L
ANION GAP SERPL CALC-SCNC: 14 MMOL/L
AST SERPL-CCNC: 24 U/L
BILIRUB SERPL-MCNC: 0.6 MG/DL
BUN SERPL-MCNC: 17 MG/DL
CALCIUM SERPL-MCNC: 9.1 MG/DL
CHLORIDE SERPL-SCNC: 104 MMOL/L
CHOLEST SERPL-MCNC: 168 MG/DL
CO2 SERPL-SCNC: 27 MMOL/L
CREAT SERPL-MCNC: 0.8 MG/DL
EGFRCR SERPLBLD CKD-EPI 2021: 83 ML/MIN/1.73M2
ESTIMATED AVERAGE GLUCOSE: 120 MG/DL
GLUCOSE SERPL-MCNC: 107 MG/DL
HBA1C MFR BLD HPLC: 5.8 %
HCT VFR BLD CALC: 39.1 %
HDLC SERPL-MCNC: 37 MG/DL
HGB BLD-MCNC: 12.1 G/DL
LDLC SERPL-MCNC: 112 MG/DL
MCHC RBC-ENTMCNC: 20.6 PG
MCHC RBC-ENTMCNC: 30.9 G/DL
MCV RBC AUTO: 66.7 FL
NONHDLC SERPL-MCNC: 131 MG/DL
PLATELET # BLD AUTO: 274 K/UL
PMV BLD AUTO: 0 /100 WBCS
PMV BLD: 11.8 FL
POTASSIUM SERPL-SCNC: 4.4 MMOL/L
PROT SERPL-MCNC: 7.3 G/DL
RBC # BLD: 5.86 M/UL
RBC # FLD: 17.5 %
SODIUM SERPL-SCNC: 145 MMOL/L
TRIGL SERPL-MCNC: 101 MG/DL
WBC # FLD AUTO: 5.23 K/UL

## 2025-05-17 LAB
25(OH)D3 SERPL-MCNC: 24 NG/ML
VIT B12 SERPL-MCNC: 398 PG/ML

## 2025-05-18 PROBLEM — Z51.81 ENCOUNTER FOR MONITORING AROMATASE INHIBITOR THERAPY: Status: ACTIVE | Noted: 2025-05-18

## 2025-05-28 ENCOUNTER — NON-APPOINTMENT (OUTPATIENT)
Age: 65
End: 2025-05-28

## 2025-05-30 ENCOUNTER — NON-APPOINTMENT (OUTPATIENT)
Age: 65
End: 2025-05-30

## 2025-05-30 NOTE — DISCHARGE NOTE PROVIDER - NSCORESITESY/N_GEN_A_CORE_RD
Per Dr. Bridges:  I have sent rx for generic Symbicort which is preferred on her formulary. 2 puff twice a day and rinse mouth after use    Patient notified and voiced understanding.   No

## 2025-06-02 DIAGNOSIS — Z91.89 OTHER SPECIFIED PERSONAL RISK FACTORS, NOT ELSEWHERE CLASSIFIED: ICD-10-CM

## 2025-06-09 ENCOUNTER — APPOINTMENT (OUTPATIENT)
Dept: SURGERY | Facility: CLINIC | Age: 65
End: 2025-06-09

## 2025-06-10 ENCOUNTER — APPOINTMENT (OUTPATIENT)
Dept: RADIATION ONCOLOGY | Facility: HOSPITAL | Age: 65
End: 2025-06-10
Payer: COMMERCIAL

## 2025-06-10 ENCOUNTER — APPOINTMENT (OUTPATIENT)
Dept: VASCULAR SURGERY | Facility: CLINIC | Age: 65
End: 2025-06-10
Payer: COMMERCIAL

## 2025-06-10 ENCOUNTER — OUTPATIENT (OUTPATIENT)
Dept: OUTPATIENT SERVICES | Facility: HOSPITAL | Age: 65
LOS: 1 days | End: 2025-06-10
Payer: COMMERCIAL

## 2025-06-10 VITALS
DIASTOLIC BLOOD PRESSURE: 72 MMHG | BODY MASS INDEX: 51.91 KG/M2 | WEIGHT: 293 LBS | SYSTOLIC BLOOD PRESSURE: 172 MMHG | HEIGHT: 63 IN | HEART RATE: 81 BPM

## 2025-06-10 VITALS
RESPIRATION RATE: 16 BRPM | WEIGHT: 293 LBS | BODY MASS INDEX: 54.33 KG/M2 | SYSTOLIC BLOOD PRESSURE: 140 MMHG | DIASTOLIC BLOOD PRESSURE: 80 MMHG | TEMPERATURE: 97.8 F | OXYGEN SATURATION: 97 % | HEART RATE: 80 BPM

## 2025-06-10 DIAGNOSIS — Z90.49 ACQUIRED ABSENCE OF OTHER SPECIFIED PARTS OF DIGESTIVE TRACT: Chronic | ICD-10-CM

## 2025-06-10 DIAGNOSIS — Z98.890 OTHER SPECIFIED POSTPROCEDURAL STATES: Chronic | ICD-10-CM

## 2025-06-10 DIAGNOSIS — Z90.710 ACQUIRED ABSENCE OF BOTH CERVIX AND UTERUS: Chronic | ICD-10-CM

## 2025-06-10 DIAGNOSIS — Z96.652 PRESENCE OF LEFT ARTIFICIAL KNEE JOINT: Chronic | ICD-10-CM

## 2025-06-10 DIAGNOSIS — D05.12 INTRADUCTAL CARCINOMA IN SITU OF LEFT BREAST: ICD-10-CM

## 2025-06-10 PROCEDURE — 93971 EXTREMITY STUDY: CPT

## 2025-06-10 PROCEDURE — G2211 COMPLEX E/M VISIT ADD ON: CPT | Mod: NC

## 2025-06-10 PROCEDURE — 99213 OFFICE O/P EST LOW 20 MIN: CPT

## 2025-06-10 PROCEDURE — 99212 OFFICE O/P EST SF 10 MIN: CPT

## 2025-06-11 DIAGNOSIS — D05.12 INTRADUCTAL CARCINOMA IN SITU OF LEFT BREAST: ICD-10-CM

## 2025-06-13 ENCOUNTER — APPOINTMENT (OUTPATIENT)
Dept: SURGERY | Facility: CLINIC | Age: 65
End: 2025-06-13
Payer: COMMERCIAL

## 2025-06-13 ENCOUNTER — NON-APPOINTMENT (OUTPATIENT)
Age: 65
End: 2025-06-13

## 2025-06-13 PROCEDURE — 97803 MED NUTRITION INDIV SUBSEQ: CPT | Mod: 95

## 2025-06-20 ENCOUNTER — APPOINTMENT (OUTPATIENT)
Dept: PLASTIC SURGERY | Facility: CLINIC | Age: 65
End: 2025-06-20

## 2025-06-20 PROCEDURE — 13121 CMPLX RPR S/A/L 2.6-7.5 CM: CPT

## 2025-06-20 PROCEDURE — 11402 EXC TR-EXT B9+MARG 1.1-2 CM: CPT

## 2025-06-20 RX ORDER — DOXYCYCLINE HYCLATE 100 MG/1
100 TABLET, COATED ORAL
Qty: 10 | Refills: 2 | Status: ACTIVE | COMMUNITY
Start: 2025-06-20 | End: 1900-01-01

## 2025-06-30 ENCOUNTER — APPOINTMENT (OUTPATIENT)
Dept: PLASTIC SURGERY | Facility: CLINIC | Age: 65
End: 2025-06-30

## 2025-06-30 PROBLEM — B37.2 CANDIDIASIS, CUTANEOUS: Status: ACTIVE | Noted: 2025-06-30

## 2025-06-30 PROCEDURE — 99024 POSTOP FOLLOW-UP VISIT: CPT

## 2025-06-30 RX ORDER — NYSTATIN 100MM UNIT
POWDER (EA) MISCELLANEOUS
Qty: 1 | Refills: 1 | Status: ACTIVE | COMMUNITY
Start: 2025-06-30 | End: 1900-01-01

## 2025-06-30 NOTE — ED PROVIDER NOTE - WET READ LAUNCH FT
Spinal Block    Patient location during procedure: OR  Start time: 6/30/2025 7:33 AM  Reason for block: procedure for pain and at surgeon's request  Staffing  Performed by: Ellen Herrera MD  Authorized by: Ellen Herrera MD    Preanesthetic Checklist  Completed: patient identified, IV checked, site marked, risks and benefits discussed, surgical consent, monitors and equipment checked, pre-op evaluation and timeout performed  Spinal Block  Patient position: sitting  Prep: ChloraPrep and site prepped and draped  Patient monitoring: frequent blood pressure checks, continuous pulse ox and heart rate  Approach: midline  Location: L3-4  Needle  Needle type: Pencan   Needle gauge: 24 G  Needle length: 4 in  Assessment  Sensory level: T10  Injection Assessment:  negative aspiration for heme, no paresthesia on injection and positive aspiration for clear CSF.  Post-procedure:  site cleaned           There are no Wet Read(s) to document. There are 5 Wet Read(s) to document.

## 2025-07-17 ENCOUNTER — APPOINTMENT (OUTPATIENT)
Facility: CLINIC | Age: 65
End: 2025-07-17
Payer: OTHER MISCELLANEOUS

## 2025-07-17 PROCEDURE — 73560 X-RAY EXAM OF KNEE 1 OR 2: CPT | Mod: 50

## 2025-07-17 PROCEDURE — 99203 OFFICE O/P NEW LOW 30 MIN: CPT

## 2025-07-18 ENCOUNTER — APPOINTMENT (OUTPATIENT)
Dept: PLASTIC SURGERY | Facility: CLINIC | Age: 65
End: 2025-07-18

## 2025-07-18 PROBLEM — L02.214 ABSCESS OF GROIN: Status: ACTIVE | Noted: 2025-07-18

## 2025-07-18 PROCEDURE — 99213 OFFICE O/P EST LOW 20 MIN: CPT

## 2025-07-25 ENCOUNTER — APPOINTMENT (OUTPATIENT)
Dept: SURGERY | Facility: CLINIC | Age: 65
End: 2025-07-25
Payer: COMMERCIAL

## 2025-07-25 PROCEDURE — 97803 MED NUTRITION INDIV SUBSEQ: CPT | Mod: 93

## 2025-07-29 ENCOUNTER — APPOINTMENT (OUTPATIENT)
Dept: SURGERY | Facility: CLINIC | Age: 65
End: 2025-07-29

## 2025-07-29 VITALS — BODY MASS INDEX: 52.61 KG/M2 | WEIGHT: 293 LBS

## 2025-07-29 DIAGNOSIS — R73.03 PREDIABETES.: ICD-10-CM

## 2025-07-29 RX ORDER — METFORMIN HYDROCHLORIDE 500 MG/1
500 TABLET, EXTENDED RELEASE ORAL
Qty: 360 | Refills: 1 | Status: ACTIVE | COMMUNITY
Start: 2025-07-29 | End: 1900-01-01

## 2025-08-01 ENCOUNTER — APPOINTMENT (OUTPATIENT)
Dept: ORTHOPEDIC SURGERY | Facility: CLINIC | Age: 65
End: 2025-08-01
Payer: COMMERCIAL

## 2025-08-01 DIAGNOSIS — G56.02 CARPAL TUNNEL SYNDROME, LEFT UPPER LIMB: ICD-10-CM

## 2025-08-01 DIAGNOSIS — G56.01 CARPAL TUNNEL SYNDROME, RIGHT UPPER LIMB: ICD-10-CM

## 2025-08-01 PROCEDURE — 99202 OFFICE O/P NEW SF 15 MIN: CPT

## 2025-08-04 ENCOUNTER — APPOINTMENT (OUTPATIENT)
Dept: PLASTIC SURGERY | Facility: CLINIC | Age: 65
End: 2025-08-04
Payer: COMMERCIAL

## 2025-08-04 ENCOUNTER — APPOINTMENT (OUTPATIENT)
Dept: VASCULAR SURGERY | Facility: CLINIC | Age: 65
End: 2025-08-04
Payer: COMMERCIAL

## 2025-08-04 VITALS — WEIGHT: 293 LBS | BODY MASS INDEX: 51.91 KG/M2 | HEIGHT: 63 IN

## 2025-08-04 DIAGNOSIS — L72.9 FOLLICULAR CYST OF THE SKIN AND SUBCUTANEOUS TISSUE, UNSPECIFIED: ICD-10-CM

## 2025-08-04 DIAGNOSIS — T14.8XXD OTHER INJURY OF UNSPECIFIED BODY REGION, SUBSEQUENT ENCOUNTER: ICD-10-CM

## 2025-08-04 DIAGNOSIS — L03.115 CELLULITIS OF RIGHT LOWER LIMB: ICD-10-CM

## 2025-08-04 DIAGNOSIS — L08.9 FOLLICULAR CYST OF THE SKIN AND SUBCUTANEOUS TISSUE, UNSPECIFIED: ICD-10-CM

## 2025-08-04 DIAGNOSIS — M79.89 OTHER SPECIFIED SOFT TISSUE DISORDERS: ICD-10-CM

## 2025-08-04 PROCEDURE — 93970 EXTREMITY STUDY: CPT

## 2025-08-04 PROCEDURE — 99203 OFFICE O/P NEW LOW 30 MIN: CPT | Mod: 25

## 2025-08-04 PROCEDURE — 99213 OFFICE O/P EST LOW 20 MIN: CPT

## 2025-08-04 RX ORDER — CEPHALEXIN 500 MG/1
500 CAPSULE ORAL 3 TIMES DAILY
Qty: 30 | Refills: 0 | Status: ACTIVE | COMMUNITY
Start: 2025-08-04 | End: 1900-01-01

## 2025-08-04 RX ORDER — DOXYCYCLINE HYCLATE 100 MG/1
100 TABLET, COATED ORAL
Qty: 14 | Refills: 0 | Status: ACTIVE | COMMUNITY
Start: 2025-08-04 | End: 1900-01-01

## 2025-08-13 DIAGNOSIS — E66.01 MORBID (SEVERE) OBESITY DUE TO EXCESS CALORIES: ICD-10-CM

## 2025-08-13 RX ORDER — TIRZEPATIDE 2.5 MG/.5ML
2.5 INJECTION, SOLUTION SUBCUTANEOUS
Qty: 1 | Refills: 5 | Status: ACTIVE | COMMUNITY
Start: 2025-08-13 | End: 1900-01-01

## 2025-08-21 RX ORDER — TIRZEPATIDE 5 MG/.5ML
5 INJECTION, SOLUTION SUBCUTANEOUS
Qty: 1 | Refills: 2 | Status: ACTIVE | COMMUNITY
Start: 2025-08-20 | End: 1900-01-01

## 2025-08-21 RX ORDER — TIRZEPATIDE 2.5 MG/.5ML
2.5 INJECTION, SOLUTION SUBCUTANEOUS
Qty: 1 | Refills: 1 | Status: ACTIVE | COMMUNITY
Start: 2025-08-20 | End: 1900-01-01

## 2025-08-22 ENCOUNTER — NON-APPOINTMENT (OUTPATIENT)
Age: 65
End: 2025-08-22